# Patient Record
Sex: FEMALE | Race: WHITE | NOT HISPANIC OR LATINO | Employment: OTHER | URBAN - METROPOLITAN AREA
[De-identification: names, ages, dates, MRNs, and addresses within clinical notes are randomized per-mention and may not be internally consistent; named-entity substitution may affect disease eponyms.]

---

## 2017-02-28 ENCOUNTER — ALLSCRIPTS OFFICE VISIT (OUTPATIENT)
Dept: OTHER | Facility: OTHER | Age: 62
End: 2017-02-28

## 2017-02-28 ENCOUNTER — APPOINTMENT (OUTPATIENT)
Dept: LAB | Age: 62
End: 2017-02-28
Payer: COMMERCIAL

## 2017-02-28 ENCOUNTER — HOSPITAL ENCOUNTER (OUTPATIENT)
Dept: RADIOLOGY | Age: 62
Discharge: HOME | End: 2017-02-28
Payer: COMMERCIAL

## 2017-02-28 ENCOUNTER — TRANSCRIBE ORDERS (OUTPATIENT)
Dept: ADMINISTRATIVE | Age: 62
End: 2017-02-28

## 2017-02-28 DIAGNOSIS — F32.9 MAJOR DEPRESSIVE DISORDER, SINGLE EPISODE: ICD-10-CM

## 2017-02-28 DIAGNOSIS — M54.50 LOW BACK PAIN: ICD-10-CM

## 2017-02-28 DIAGNOSIS — R94.6 ABNORMAL RESULTS OF THYROID FUNCTION STUDIES: ICD-10-CM

## 2017-02-28 DIAGNOSIS — M79.646 PAIN OF FINGER: ICD-10-CM

## 2017-02-28 DIAGNOSIS — R26.89 OTHER ABNORMALITIES OF GAIT AND MOBILITY: ICD-10-CM

## 2017-02-28 DIAGNOSIS — M54.2 CERVICALGIA: ICD-10-CM

## 2017-02-28 DIAGNOSIS — R51.9 HEADACHE: ICD-10-CM

## 2017-02-28 LAB
ALBUMIN SERPL BCP-MCNC: 3.4 G/DL (ref 3.5–5)
ALP SERPL-CCNC: 121 U/L (ref 46–116)
ALT SERPL W P-5'-P-CCNC: 15 U/L (ref 12–78)
ANION GAP SERPL CALCULATED.3IONS-SCNC: 7 MMOL/L (ref 4–13)
AST SERPL W P-5'-P-CCNC: 18 U/L (ref 5–45)
BASOPHILS # BLD AUTO: 0.03 THOUSANDS/ΜL (ref 0–0.1)
BASOPHILS NFR BLD AUTO: 0 % (ref 0–1)
BILIRUB SERPL-MCNC: 0.31 MG/DL (ref 0.2–1)
BUN SERPL-MCNC: 8 MG/DL (ref 5–25)
CALCIUM SERPL-MCNC: 9.4 MG/DL (ref 8.3–10.1)
CHLORIDE SERPL-SCNC: 105 MMOL/L (ref 100–108)
CO2 SERPL-SCNC: 28 MMOL/L (ref 21–32)
CREAT SERPL-MCNC: 0.86 MG/DL (ref 0.6–1.3)
EOSINOPHIL # BLD AUTO: 0.11 THOUSAND/ΜL (ref 0–0.61)
EOSINOPHIL NFR BLD AUTO: 2 % (ref 0–6)
ERYTHROCYTE [DISTWIDTH] IN BLOOD BY AUTOMATED COUNT: 14.5 % (ref 11.6–15.1)
ERYTHROCYTE [SEDIMENTATION RATE] IN BLOOD: 36 MM/HOUR (ref 0–20)
GFR SERPL CREATININE-BSD FRML MDRD: >60 ML/MIN/1.73SQ M
GLUCOSE SERPL-MCNC: 80 MG/DL (ref 65–140)
HCT VFR BLD AUTO: 39.9 % (ref 34.8–46.1)
HGB BLD-MCNC: 13 G/DL (ref 11.5–15.4)
LYMPHOCYTES # BLD AUTO: 3.07 THOUSANDS/ΜL (ref 0.6–4.47)
LYMPHOCYTES NFR BLD AUTO: 42 % (ref 14–44)
MCH RBC QN AUTO: 29.7 PG (ref 26.8–34.3)
MCHC RBC AUTO-ENTMCNC: 32.6 G/DL (ref 31.4–37.4)
MCV RBC AUTO: 91 FL (ref 82–98)
MONOCYTES # BLD AUTO: 0.62 THOUSAND/ΜL (ref 0.17–1.22)
MONOCYTES NFR BLD AUTO: 8 % (ref 4–12)
NEUTROPHILS # BLD AUTO: 3.52 THOUSANDS/ΜL (ref 1.85–7.62)
NEUTS SEG NFR BLD AUTO: 48 % (ref 43–75)
NRBC BLD AUTO-RTO: 0 /100 WBCS
PLATELET # BLD AUTO: 393 THOUSANDS/UL (ref 149–390)
PMV BLD AUTO: 10.4 FL (ref 8.9–12.7)
POTASSIUM SERPL-SCNC: 4.6 MMOL/L (ref 3.5–5.3)
PROT SERPL-MCNC: 7.4 G/DL (ref 6.4–8.2)
RBC # BLD AUTO: 4.37 MILLION/UL (ref 3.81–5.12)
SODIUM SERPL-SCNC: 140 MMOL/L (ref 136–145)
T4 FREE SERPL-MCNC: 0.77 NG/DL (ref 0.76–1.46)
TSH SERPL DL<=0.05 MIU/L-ACNC: 2.51 UIU/ML (ref 0.36–3.74)
WBC # BLD AUTO: 7.37 THOUSAND/UL (ref 4.31–10.16)

## 2017-02-28 PROCEDURE — 80053 COMPREHEN METABOLIC PANEL: CPT

## 2017-02-28 PROCEDURE — 84443 ASSAY THYROID STIM HORMONE: CPT

## 2017-02-28 PROCEDURE — 84439 ASSAY OF FREE THYROXINE: CPT

## 2017-02-28 PROCEDURE — 85025 COMPLETE CBC W/AUTO DIFF WBC: CPT

## 2017-02-28 PROCEDURE — 36415 COLL VENOUS BLD VENIPUNCTURE: CPT

## 2017-02-28 PROCEDURE — 73140 X-RAY EXAM OF FINGER(S): CPT

## 2017-02-28 PROCEDURE — 85652 RBC SED RATE AUTOMATED: CPT

## 2017-03-02 ENCOUNTER — GENERIC CONVERSION - ENCOUNTER (OUTPATIENT)
Dept: OTHER | Facility: OTHER | Age: 62
End: 2017-03-02

## 2017-03-21 ENCOUNTER — ALLSCRIPTS OFFICE VISIT (OUTPATIENT)
Dept: OTHER | Facility: OTHER | Age: 62
End: 2017-03-21

## 2017-04-11 ENCOUNTER — ALLSCRIPTS OFFICE VISIT (OUTPATIENT)
Dept: OTHER | Facility: OTHER | Age: 62
End: 2017-04-11

## 2017-06-29 ENCOUNTER — ALLSCRIPTS OFFICE VISIT (OUTPATIENT)
Dept: OTHER | Facility: OTHER | Age: 62
End: 2017-06-29

## 2017-10-31 ENCOUNTER — APPOINTMENT (OUTPATIENT)
Dept: LAB | Facility: CLINIC | Age: 62
End: 2017-10-31
Payer: COMMERCIAL

## 2017-10-31 ENCOUNTER — ALLSCRIPTS OFFICE VISIT (OUTPATIENT)
Dept: OTHER | Facility: OTHER | Age: 62
End: 2017-10-31

## 2017-10-31 DIAGNOSIS — R94.6 ABNORMAL RESULTS OF THYROID FUNCTION STUDIES: ICD-10-CM

## 2017-10-31 DIAGNOSIS — R53.83 OTHER FATIGUE: ICD-10-CM

## 2017-10-31 DIAGNOSIS — F41.9 ANXIETY DISORDER: ICD-10-CM

## 2017-10-31 DIAGNOSIS — R26.89 OTHER ABNORMALITIES OF GAIT AND MOBILITY: ICD-10-CM

## 2017-10-31 DIAGNOSIS — Z20.2 CONTACT WITH AND (SUSPECTED) EXPOSURE TO INFECTIONS WITH A PREDOMINANTLY SEXUAL MODE OF TRANSMISSION: ICD-10-CM

## 2017-10-31 DIAGNOSIS — F32.1 MAJOR DEPRESSIVE DISORDER, SINGLE EPISODE, MODERATE (HCC): ICD-10-CM

## 2017-10-31 DIAGNOSIS — M54.50 LOW BACK PAIN: ICD-10-CM

## 2017-10-31 DIAGNOSIS — R29.90 UNSPECIFIED SYMPTOMS AND SIGNS INVOLVING THE NERVOUS SYSTEM (CODE): ICD-10-CM

## 2017-10-31 LAB
ALBUMIN SERPL BCP-MCNC: 3.3 G/DL (ref 3.5–5)
ALP SERPL-CCNC: 132 U/L (ref 46–116)
ALT SERPL W P-5'-P-CCNC: 17 U/L (ref 12–78)
ANION GAP SERPL CALCULATED.3IONS-SCNC: 6 MMOL/L (ref 4–13)
AST SERPL W P-5'-P-CCNC: 21 U/L (ref 5–45)
BASOPHILS # BLD AUTO: 0.04 THOUSANDS/ΜL (ref 0–0.1)
BASOPHILS NFR BLD AUTO: 1 % (ref 0–1)
BILIRUB SERPL-MCNC: 0.17 MG/DL (ref 0.2–1)
BUN SERPL-MCNC: 9 MG/DL (ref 5–25)
CALCIUM SERPL-MCNC: 9.1 MG/DL (ref 8.3–10.1)
CHLORIDE SERPL-SCNC: 107 MMOL/L (ref 100–108)
CO2 SERPL-SCNC: 27 MMOL/L (ref 21–32)
CREAT SERPL-MCNC: 0.81 MG/DL (ref 0.6–1.3)
EOSINOPHIL # BLD AUTO: 0.09 THOUSAND/ΜL (ref 0–0.61)
EOSINOPHIL NFR BLD AUTO: 1 % (ref 0–6)
ERYTHROCYTE [DISTWIDTH] IN BLOOD BY AUTOMATED COUNT: 14.9 % (ref 11.6–15.1)
GFR SERPL CREATININE-BSD FRML MDRD: 78 ML/MIN/1.73SQ M
GLUCOSE SERPL-MCNC: 67 MG/DL (ref 65–140)
HCT VFR BLD AUTO: 40.8 % (ref 34.8–46.1)
HCV AB SER QL: NORMAL
HGB BLD-MCNC: 13 G/DL (ref 11.5–15.4)
LYMPHOCYTES # BLD AUTO: 2.32 THOUSANDS/ΜL (ref 0.6–4.47)
LYMPHOCYTES NFR BLD AUTO: 30 % (ref 14–44)
MCH RBC QN AUTO: 29 PG (ref 26.8–34.3)
MCHC RBC AUTO-ENTMCNC: 31.9 G/DL (ref 31.4–37.4)
MCV RBC AUTO: 91 FL (ref 82–98)
MONOCYTES # BLD AUTO: 0.58 THOUSAND/ΜL (ref 0.17–1.22)
MONOCYTES NFR BLD AUTO: 7 % (ref 4–12)
NEUTROPHILS # BLD AUTO: 4.76 THOUSANDS/ΜL (ref 1.85–7.62)
NEUTS SEG NFR BLD AUTO: 61 % (ref 43–75)
NRBC BLD AUTO-RTO: 0 /100 WBCS
PLATELET # BLD AUTO: 401 THOUSANDS/UL (ref 149–390)
PMV BLD AUTO: 10.2 FL (ref 8.9–12.7)
POTASSIUM SERPL-SCNC: 4.3 MMOL/L (ref 3.5–5.3)
PROT SERPL-MCNC: 7.6 G/DL (ref 6.4–8.2)
RBC # BLD AUTO: 4.49 MILLION/UL (ref 3.81–5.12)
SODIUM SERPL-SCNC: 140 MMOL/L (ref 136–145)
TSH SERPL DL<=0.05 MIU/L-ACNC: 3.39 UIU/ML (ref 0.36–3.74)
WBC # BLD AUTO: 7.81 THOUSAND/UL (ref 4.31–10.16)

## 2017-10-31 PROCEDURE — 84443 ASSAY THYROID STIM HORMONE: CPT

## 2017-10-31 PROCEDURE — 36415 COLL VENOUS BLD VENIPUNCTURE: CPT

## 2017-10-31 PROCEDURE — 80053 COMPREHEN METABOLIC PANEL: CPT

## 2017-10-31 PROCEDURE — 85025 COMPLETE CBC W/AUTO DIFF WBC: CPT

## 2017-10-31 PROCEDURE — 86592 SYPHILIS TEST NON-TREP QUAL: CPT

## 2017-10-31 PROCEDURE — 87389 HIV-1 AG W/HIV-1&-2 AB AG IA: CPT

## 2017-10-31 PROCEDURE — 86803 HEPATITIS C AB TEST: CPT

## 2017-10-31 PROCEDURE — 87529 HSV DNA AMP PROBE: CPT

## 2017-11-01 LAB
HIV 1+2 AB+HIV1 P24 AG SERPL QL IA: NORMAL
RPR SER QL: NORMAL

## 2017-11-03 LAB
HSV1 DNA SPEC QL NAA+PROBE: NEGATIVE
HSV2 DNA SPEC QL NAA+PROBE: NEGATIVE

## 2017-11-03 NOTE — PROGRESS NOTES
Assessment  1  Anxiety (300 00) (F41 9)   2  Moderate major depression (296 22) (F32 1)   3  Imbalance (781 2) (R26 89)   4  Fatigue (780 79) (R53 83)   5  Possible exposure to STD (V01 6) (Z20 2)   6  Elevated TSH (794 5) (R94 6)   7  Lower back pain (724 2) (M54 5)   8  Abnormal neurological findings (781 99) (R29 90)    Plan   Abnormal neurological findings, Anxiety, Elevated TSH, Fatigue, Imbalance, Lower back  pain, Moderate major depression, Possible exposure to STD    · (1) HSV TYPING; Status:Active; Requested for:31Oct2017;    · (1) TSH; Status:Active; Requested for:31Oct2017; Abnormal neurological findings, Imbalance    · * MRI BRAIN W WO CONTRAST; Status:Need Information - Financial Authorization; Requested for:31Oct2017; Moderate major depression    · BuPROPion HCl ER (XL) 150 MG Oral Tablet Extended Release 24 Hour; TAKE  ONE (1) TABLET(S) DAILY    (1) RPR; Status:Resulted - Requires Verification;   Done: 15DJE3630 12:00AM  Due:31Oct2018; Ordered; For:Abnormal neurological findings, Anxiety, Elevated TSH, Fatigue, Imbalance, Lower back pain, Moderate major depression, Possible exposure to STD; Ordered By:Cheryl Goodman;   (1) HIV AG/AB COMBO, 4TH GEN; [Do Not Release]; Status:Resulted - Requires Verification;   Done: 95VMA2155 12:00AM  XTY:[id number]; Ordered; For:Abnormal neurological findings, Anxiety, Elevated TSH, Fatigue, Imbalance, Lower back pain, Moderate major depression, Possible exposure to STD; Ordered By:Cheryl Goodman;    (1) CBC/PLT/DIFF; Status:Resulted - Requires Verification;   Done: 62FEO4462 12:00AM  HPE:[id number]; Ordered; For:Abnormal neurological findings, Anxiety, Elevated TSH, Fatigue, Imbalance, Lower back pain, Moderate major depression, Possible exposure to STD; Ordered By:Cheryl Goodman;   (1) COMPREHENSIVE METABOLIC PANEL; Status:Resulted - Requires Verification;   Done: 00FTE4612 12:00AM  MKC:[id number]; Ordered;     For:Abnormal neurological findings, Anxiety, Elevated TSH, Fatigue, Imbalance, Lower back pain, Moderate major depression, Possible exposure to STD; Ordered By:Dajuan Goodman;   (1) HEP C ANTIBODY; Status:Resulted - Requires Verification;   Done: 08FML4722 12:00AM  VUO:19EBZ1453; Ordered; For:Abnormal neurological findings, Anxiety, Elevated TSH, Fatigue, Imbalance, Lower back pain, Moderate major depression, Possible exposure to STD; Ordered By:Corina Goodman;      Discussion/Summary    #1 moderate depression with anxiety - pt has failed multiple meds in the past and would definitely benefit from counselling  I will reach out to Mr Georgette Monroy to see if we can arrange for e-visit with Sherita  professional  I would like her to see psych as well but will wait to see if transportation issues improve  I will add Wellbutrin XL 150mg qd for now  I reviewed side effects of meds  Recheck 1mimbalance - with abnormal neurologic exam  I reviewed with pt  REC: refer for MRI of the brain? exposure to STD - check labs  elevated TSH - check labslow back pain - chronic  We discussed treatment options  Pt would like to wait until above issues are addressed  Recheck 4 weeksfatigue - ? related to depression? Check labs as above  Recheck as above  to f/u as above  Pt to call for problems or concerns in the interim  The patient was counseled regarding diagnostic results,-- instructions for management,-- risk factor reductions,-- prognosis,-- patient and family education,-- impressions,-- risks and benefits of treatment options,-- importance of compliance with treatment  Possible side effects of new medications were reviewed with the patient/guardian today  The treatment plan was reviewed with the patient/guardian  The patient/guardian understands and agrees with the treatment plan      Chief Complaint  4 month follow upand anxietyoff   Patient is here today for follow up of chronic conditions described in HPI        History of Present Illness  as above - f/u multiple issuespt lost a friend last weekend and has been feeling sad since  Was having issues with sleep bu is not sleeping better since she started melatonin  Pt would like to speak with a counsellor but has transportation issues  pt found that an ex-contact passed away from syphilis and would like to be tested  Pt would also like to be checked for HSVpt denies CP, palp, lightheadedness or other CV symtposmstill having ear issues  Pt describes a feeling like something is crawling on the skin of the outer ear near the auditory canal  Pt does use q-tipsSome increased imbalance  Family notes that she could be walking when she suddenly loses her balance  Pt states that she gets her balance back almost immediately  No falls associated with this  No asymmetric weakness or numbness  Still with low back pain though it is not clear if this pain is related to her weaknessI reviewed PMHx, PSHx, Soc hx and Med list with pt and family member      Review of Systems    Constitutional: as noted in HPI  Eyes: No complaints of eye pain, no red eyes, no eyesight problems, no discharge, no dry eyes, no itching of eyes  ENT: no complaints of earache, no loss of hearing, no nose bleeds, no nasal discharge, no sore throat, no hoarseness  Cardiovascular: No complaints of slow heart rate, no fast heart rate, no chest pain, no palpitations, no leg claudication, no lower extremity edema  Respiratory: No complaints of shortness of breath, no wheezing, no cough, no SOB on exertion, no orthopnea, no PND  Gastrointestinal: No complaints of abdominal pain, no constipation, no nausea or vomiting, no diarrhea, no bloody stools  Genitourinary: No complaints of dysuria, no incontinence, no pelvic pain, no dysmenorrhea, no vaginal discharge or bleeding  Musculoskeletal: as noted in HPI  Integumentary: No complaints of skin rash or lesions, no itching, no skin wounds, no breast pain or lump  Neurological: as noted in HPI  Psychiatric: as noted in HPI  Endocrine: No complaints of proptosis, no hot flashes, no muscle weakness, no deepening of the voice, no feelings of weakness  Hematologic/Lymphatic: No complaints of swollen glands, no swollen glands in the neck, does not bleed easily, does not bruise easily  Over the past 2 weeks, how often have you been bothered by the following problems? 1 ) Little interest or pleasure in doing things? Nearly every day  2 ) Feeling down, depressed or hopeless? Nearly every day  3 ) Trouble falling asleep or sleeping too much? Several days  4 ) Feeling tired or having little energy? Half the days or more  5 ) Poor appetite or overeating? Half the days or more  6 ) Feeling bad about yourself, or that you are a failure, or have let yourself or your family down? Several days  7 ) Trouble concentrating on things, such as reading a newspaper or watching television? Nearly every day  8 ) Moving or speaking so slowly that other people could have noticed, or the opposite, moving or speaking faster than usual? Not at all    9 ) Thoughts that you would be better off dead or of hurting yourself in some way? 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)   13  Neck pain (723 1) (M54 2)   14  Osteoarthritis (715 90) (M19 90)   15  Thumb pain (729 5) (M79 646)    Surgical History  1  History of Bladder Surgery   2  History of Gallbladder Surgery   3  History of Percutaneous Cholecystostomy   4  History of Urethra Surgery    Family History  Mother    1  Family history of Methicillin Resistant Staphylococcus Aureus Infection   2  Family history of Reported Previous Mental Illness  Father    3  Family history of Lung Cancer (V16 1)    Social History   · Denied: History of Alcohol Use (History)   · Always uses seat belt   · Current Every Day Smoker (305 1)   · Daily Coffee Consumption (___ Cups/Day)   · Denied: History of Daily Cola Consumption (___ Cans/Day)   · Denied: History of Dental care, regularly   · Does not exercise (V69 0) (Z72 3)   · High school graduate   · 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)   · No guns in the home   · No illicit drug use   · No living will   · Pets in the home   · Denied: History of Power of  in existence   · Retired   · Tea   · Water intake, adequate (per day)    Current Meds   1  Calcium 600 MG Oral Tablet; Therapy: (Recorded:11Oct2012) to Recorded   2  ClonazePAM 0 5 MG Oral Tablet; TAKE ONE (1) TABLET daily at bedtime; Therapy: 19XUS0226 to (Evaluate:16Nov2017)  Requested for: 60PXS5930; Last   Rx:17Oct2017 Ordered   3  [employer] Vitamin B-12 1000 MCG Oral Tablet; Therapy: (Recorded:11Oct2012) to Recorded   4  Equate Allergy-Sinus Headache TABS; TAKE 1 TABLET AT BEDTIME Recorded   5  Fluticasone Propionate 50 MCG/ACT Nasal Suspension; USE 2 SPRAYS IN EACH   NOSTRIL ONCE DAILY; Therapy: 24NVG6191 to (Last [facility])  Requested for: 16NQZ9583 Ordered   6  Gabapentin 300 MG Oral Capsule; TAKE ONE CAPSULE BY MOUTH THREE TIMES A   DAY; Therapy: 72FKB0331 to (Evaluate:03Mar2018)  Requested for: 07CWT5173; Last   Rx:08Mar2017 Ordered   7  Magnesium CAPS; TAKE 1 CAPSULE Daily; Therapy: (Recorded:13Nov2012) to Recorded   8  Melatonin 1 MG Oral Capsule; Therapy: (Recorded:31Oct2017) to Recorded   9  Multi-Vitamin TABS; Therapy: (Recorded:11Oct2012) to Recorded   10   Omeprazole 40 MG Oral Capsule Delayed Release; TAKE ONE CAPSULE BY MOUTH    EVERY DAY; Therapy: 25DYY5760 to (Evaluate:13Nov2017)  Requested for: 18CAQ9604; Last    Rx:77Uco9654 Ordered   11  Venlafaxine HCl  MG Oral Capsule Extended Release 24 Hour; TAKE ONE    CAPSULE BY MOUTH EVERY DAY; Therapy: 14TWN3895 to (Evaluate:03Mar2018)  Requested for: 83PRD7542; Last    Rx:08Mar2017 Ordered   12  Venlafaxine HCl ER 75 MG Oral Capsule Extended Release 24 Hour; TAKE ONE    CAPSULE BY MOUTH EVERY DAY; Therapy: 71OFZ3131 to (Evaluate:84Qbu0110)  Requested for: 25Lhs0919; Last    Rx:03Sep2017 Ordered   13  Vitamin D 1000 UNIT CAPS; Therapy: (Recorded:11Oct2012) to Recorded    Allergies  1  Penicillins   2  Vancomycin HCl CAPS    Vitals  Vital Signs    Recorded: 75SNO2654 08:58AM   Temperature 94 7 F   Heart Rate 78   Respiration 16   Systolic 590   Diastolic 66   Height 5 ft 3 in   Weight 156 lb 8 oz   BMI Calculated 27 72   BSA Calculated 1 74     Physical Exam    Constitutional   General appearance: No acute distress, well appearing and well nourished  Head and Face   Head and face: Normal     Eyes   Conjunctiva and lids: No swelling, erythema or discharge  Pupils and irises: Equal, round, reactive to light  Ophthalmoscopic examination: Normal fundi and optic discs  Ears, Nose, Mouth, and Throat   External inspection of ears and nose: Abnormal  -- mild irritation (dry appearing) at the auditory canal orific  Otoscopic examination: Tympanic membranes translucent with normal light reflex  Canals patent without erythema  Nasal mucosa, septum, and turbinates: Normal without edema or erythema  Lips, teeth, and gums: Normal, good dentition  Oropharynx: Normal with no erythema, edema, exudate or lesions  Neck   Neck: Supple, symmetric, trachea midline, no masses  Thyroid: Normal, no thyromegaly  Pulmonary   Respiratory effort: No increased work of breathing or signs of respiratory distress      Auscultation of lungs: Clear to auscultation  Cardiovascular   Auscultation of heart: Normal rate and rhythm, normal S1 and S2, no murmurs  Carotid pulses: 2+ bilaterally  Abdominal aorta: Normal     Pedal pulses: 2+ bilaterally  Peripheral vascular exam: Normal     Examination of extremities for edema and/or varicosities: Normal     Abdomen   Abdomen: Non-tender, no masses  Liver and spleen: No hepatomegaly or splenomegaly  Lymphatic   Palpation of lymph nodes in neck: No lymphadenopathy  Palpation of lymph nodes in other areas: No lymphadenopathy  Musculoskeletal   Gait and station: Abnormal  -- mildly unbalanced gait  Digits and nails: Abnormal  -- diffuse OA changes  Joints, bones, and muscles: Abnormal  -- sl TTP over the low lumbar spine  Muscle strength/tone: Abnormal  -- ? mild weakenss on L hand   Skin   Skin and subcutaneous tissue: Normal without rashes or lesions  Neurologic   Cranial nerves: Cranial nerves II-XII intact  Cortical function: Normal mental status  Reflexes: Abnormal  -- increaseed reflexes throughout  Sensation: No sensory loss  Coordination: Abnormal  -- (+) severe imbalance when pt closes her eyes  Psychiatric   Judgment and insight: Normal     Orientation to person, place, and time: Normal     Mood and affect: Abnormal  -- as abovwe  PHQ-9 = 15  Future Appointments    Date/Time Provider Specialty Site   11/28/2017 10:15 AM RENNY Rollins   [address] [facility]     Signatures   Electronically signed by : RENNY Aguilera ; Nov 2 2017  9:13AM EST                       (Author)

## 2017-11-13 ENCOUNTER — TRANSCRIBE ORDERS (OUTPATIENT)
Dept: ADMINISTRATIVE | Facility: HOSPITAL | Age: 62
End: 2017-11-13

## 2017-11-13 DIAGNOSIS — R26.89 BALANCE DISORDER: Primary | ICD-10-CM

## 2017-11-28 ENCOUNTER — GENERIC CONVERSION - ENCOUNTER (OUTPATIENT)
Dept: OTHER | Facility: OTHER | Age: 62
End: 2017-11-28

## 2017-12-03 ENCOUNTER — HOSPITAL ENCOUNTER (OUTPATIENT)
Dept: MRI IMAGING | Facility: HOSPITAL | Age: 62
Discharge: HOME | End: 2017-12-03
Payer: COMMERCIAL

## 2017-12-03 DIAGNOSIS — R26.89 BALANCE DISORDER: ICD-10-CM

## 2017-12-21 ENCOUNTER — TRANSCRIBE ORDERS (OUTPATIENT)
Dept: ADMINISTRATIVE | Facility: HOSPITAL | Age: 62
End: 2017-12-21

## 2017-12-21 DIAGNOSIS — R29.90 NEUROTOXICITY: Primary | ICD-10-CM

## 2017-12-21 DIAGNOSIS — R26.89 SCISSORING GAIT: ICD-10-CM

## 2017-12-27 DIAGNOSIS — R89.2 ABNORMAL LEVEL OF OTHER DRUGS, MEDICAMENTS AND BIOLOGICAL SUBSTANCES IN SPECIMENS FROM OTHER ORGANS, SYSTEMS AND TISSUES: ICD-10-CM

## 2017-12-27 DIAGNOSIS — R26.89 OTHER ABNORMALITIES OF GAIT AND MOBILITY: ICD-10-CM

## 2017-12-27 DIAGNOSIS — R29.90 UNSPECIFIED SYMPTOMS AND SIGNS INVOLVING THE NERVOUS SYSTEM (CODE): ICD-10-CM

## 2017-12-28 ENCOUNTER — LAB (OUTPATIENT)
Dept: LAB | Facility: CLINIC | Age: 62
End: 2017-12-28
Payer: COMMERCIAL

## 2017-12-28 DIAGNOSIS — R89.2 ABNORMAL LEVEL OF OTHER DRUGS, MEDICAMENTS AND BIOLOGICAL SUBSTANCES IN SPECIMENS FROM OTHER ORGANS, SYSTEMS AND TISSUES: ICD-10-CM

## 2017-12-28 LAB
BUN SERPL-MCNC: 10 MG/DL (ref 5–25)
CREAT SERPL-MCNC: 0.8 MG/DL (ref 0.6–1.3)
GFR SERPL CREATININE-BSD FRML MDRD: 78 ML/MIN/1.73SQ M

## 2017-12-28 PROCEDURE — 84520 ASSAY OF UREA NITROGEN: CPT

## 2017-12-28 PROCEDURE — 82565 ASSAY OF CREATININE: CPT

## 2017-12-28 PROCEDURE — 36415 COLL VENOUS BLD VENIPUNCTURE: CPT

## 2018-01-02 ENCOUNTER — HOSPITAL ENCOUNTER (OUTPATIENT)
Dept: RADIOLOGY | Facility: HOSPITAL | Age: 63
Discharge: HOME | End: 2018-01-02
Payer: COMMERCIAL

## 2018-01-02 DIAGNOSIS — R29.90 UNSPECIFIED SYMPTOMS AND SIGNS INVOLVING THE NERVOUS SYSTEM (CODE): ICD-10-CM

## 2018-01-02 DIAGNOSIS — R26.89 OTHER ABNORMALITIES OF GAIT AND MOBILITY: ICD-10-CM

## 2018-01-02 PROCEDURE — 70553 MRI BRAIN STEM W/O & W/DYE: CPT

## 2018-01-02 PROCEDURE — A9585 GADOBUTROL INJECTION: HCPCS | Performed by: RADIOLOGY

## 2018-01-02 RX ADMIN — GADOBUTROL 7 ML: 604.72 INJECTION INTRAVENOUS at 13:31

## 2018-01-03 ENCOUNTER — GENERIC CONVERSION - ENCOUNTER (OUTPATIENT)
Dept: OTHER | Facility: OTHER | Age: 63
End: 2018-01-03

## 2018-01-12 VITALS
DIASTOLIC BLOOD PRESSURE: 78 MMHG | HEIGHT: 63 IN | BODY MASS INDEX: 25.87 KG/M2 | HEART RATE: 96 BPM | WEIGHT: 146 LBS | SYSTOLIC BLOOD PRESSURE: 116 MMHG | TEMPERATURE: 98.4 F

## 2018-01-12 VITALS
WEIGHT: 156.5 LBS | DIASTOLIC BLOOD PRESSURE: 66 MMHG | TEMPERATURE: 94.7 F | BODY MASS INDEX: 27.73 KG/M2 | RESPIRATION RATE: 16 BRPM | HEART RATE: 78 BPM | HEIGHT: 63 IN | SYSTOLIC BLOOD PRESSURE: 118 MMHG

## 2018-01-13 VITALS
SYSTOLIC BLOOD PRESSURE: 118 MMHG | TEMPERATURE: 96.7 F | HEART RATE: 76 BPM | WEIGHT: 149 LBS | HEIGHT: 63 IN | BODY MASS INDEX: 26.4 KG/M2 | DIASTOLIC BLOOD PRESSURE: 64 MMHG

## 2018-01-13 NOTE — MISCELLANEOUS
Assessment    1  Acute cholecystitis (575 0) (K81 0)   2  Aphthous ulcer (528 2) (K12 0)   3  History of Percutaneous Cholecystostomy    Plan  Aphthous ulcer    · Orabase 20 % Mouth/Throat Paste; APPLY SPARINGLY 3 TIMES A DAY   Rx By: Josephine Estrada; Dispense: 4 Days ; #:1 X 11 9 GM Tube; Refill: 1; For: Aphthous ulcer; PATRICE = N; Verified Transmission to [facility] 9934; Last Updated By: System, [employer]; 5/23/2016 4:49:26 PM    Discussion/Summary  Discussion Summary:   #1 acute cholecystitis - status post cholecystostomy tube placement  Patient is improving  Patient aware that she will have this tube in place for minimum of 6 months before they consider removing her gallbladder  I reviewed signs of obstruction, as well as signs of infection  Patient will finish in about therapy  Patient will follow-up in 4-6 weeks  Continue follow-up with surgery  #2 aphthous ulcer - persistent  Given history of smoking this is a little worrisome  We will try patient on Orabase as directed 3-4 times a day  Recheck 2 weeks if not resolving  Consider biopsy if not resolving  Patient recheck as above  Patient call for problems or concerns in the interim  Counseling Documentation With Imm: The patient was counseled regarding diagnostic results, instructions for management, risk factor reductions, prognosis, patient and family education, impressions, risks and benefits of treatment options, importance of compliance with treatment  Medication SE Review and Pt Understands Tx: Possible side effects of new medications were reviewed with the patient/guardian today  The treatment plan was reviewed with the patient/guardian  The patient/guardian understands and agrees with the treatment plan      History of Present Illness  TCM Communication [facility]: The patient is being contacted for follow-up after hospitalization and APPOINT SCHEDULED FOR 5-23-16 AT 1:45 PM Faustina Beory   She was hospitalized at and [facility] [place]  The dates of hospitalization:, date of admission: 5-11-16, date of discharge: 5-13-16  Diagnosis: CHOLECYSTOSTOMY TUBE  She was discharged to home  Medications reviewed and updated today  She scheduled a follow up appointment  The patient is currently asymptomatic  Counseling was provided to the patient  Communication performed and completed by RENNY Katz LPN   HPI: as above  - pt admitted acutely on 5/11 for severe RUQ pain  Pt had undergone w/u for persistent RUQ and epigastric pain which showed GB sludge and a 1 3 cm gallstone  In the hospital, pt was found to have too much inflammation for surgery and had percutaneous GB drain placed by IR  Pt was discharged on abx with plan for elective cholecystectomy in August    - pt states that she feels "a little better each day" since surgery  NO change in BMs  No fever/chills  Back pain is a little worse  No dysuria of frequency  No CP, palp, lightheadedness or other CV symptoms  - 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 that she like to have it evaluated  It may improved but never quite goes away  This is been going on for over a month  Patient is a smoker   - I reviewed available hospital records, lab results and study results with patient  Review of Systems  Complete-Female:   Constitutional: as noted in HPI  Eyes: No complaints of eye pain, no red eyes, no eyesight problems, no discharge, no dry eyes, no itching of eyes  ENT: no complaints of earache, no loss of hearing, no nose bleeds, no nasal discharge, no sore throat, no hoarseness  Cardiovascular: No complaints of slow heart rate, no fast heart rate, no chest pain, no palpitations, no leg claudication, no lower extremity edema  Respiratory: No complaints of shortness of breath, no wheezing, no cough, no SOB on exertion, no orthopnea, no PND  Gastrointestinal: as noted in HPI     Genitourinary: No complaints of dysuria, no incontinence, no pelvic pain, no dysmenorrhea, no vaginal discharge or bleeding  Musculoskeletal: arthralgias and myalgias, but no limb swelling  Integumentary: No complaints of skin rash or lesions, no itching, no skin wounds, no breast pain or lump  Neurological: No complaints of headache, no confusion, no convulsions, no numbness, no dizziness or fainting, no tingling, no limb weakness, no difficulty walking  Endocrine: No complaints of proptosis, no hot flashes, no muscle weakness, no deepening of the voice, no feelings of weakness  Hematologic/Lymphatic: No complaints of swollen glands, no swollen glands in the neck, does not bleed easily, does not bruise easily  Active Problems    1  Abdominal pain, epigastric (789 06) (R10 13)   2  Abdominal pain, RLQ (right lower quadrant) (789 03) (R10 31)   3  Abrasion Of The Elbow (913 0)   4  Abrasion On The Left Lower Back (911 0)   5  Acute frontal sinusitis (461 1) (J01 10)   6  Acute maxillary sinusitis (461 0) (J01 00)   7  Acute suppurative otitis media without spontaneous rupture of ear drum, unspecified   laterality   8  Anxiety (300 00) (F41 9)   9  Bronchitis, acute (466 0) (J20 9)   10  Contusion of skin with intact surface (924 9) (T14 8)   11  Cough (786 2) (R05)   12  Depression (311) (F32 9)   13  Excessive thirst (783 5) (R63 1)   14  Fatigue (780 79) (R53 83)   15  Gastroesophageal reflux disease (530 81) (K21 9)   16  Hip pain, unspecified laterality   17  Joint pain, knee (719 46) (M25 569)   18  Leg Weakness (728 87)   19  Lower back pain (724 2) (M54 5)   20  Neck pain (723 1) (M54 2)   21  Osteoarthritis (715 90) (M19 90)   22  Otitis externa, unspecified laterality   23  Urinary tract infection (599 0) (N39 0)    Surgical History    1  History of Bladder Surgery   2  History of Percutaneous Cholecystostomy   3  History of Urethra Surgery  Surgical History Reviewed: The surgical history was reviewed and updated today  Family History  Mother    1   Family history of Methicillin Resistant Staphylococcus Aureus Infection   2  Family history of Reported Previous Mental Illness  Father    3  Family history of Lung Cancer (V16 1)    Social History    · Denied: History of Alcohol Use (History)   · Current Every Day Smoker (305 1)   · Daily Coffee Consumption (___ Cups/Day)   · Daily Cola Consumption (___ Cans/Day)  Social History Reviewed: The social history was reviewed and updated today  Current Meds   1  Calcium 600 MG Oral Tablet; Therapy: (Recorded:11Oct2012) to Recorded   2  ClonazePAM 0 5 MG Oral Tablet; TAKE ONE (1) TABLET daily at bedtime; Therapy: 47TLA5906 to (Evaluate:04Jun2016)  Requested for: 93CNN0374; Last   RS:97BJX6176 Ordered   3  [employer] Vitamin B-12 1000 MCG Oral Tablet; Therapy: (Recorded:11Oct2012) to Recorded   4  Effexor  MG Oral Capsule Extended Release 24 Hour; TAKE 1 CAPSULE DAILY; Therapy: 98BLE6385 to (Renew:05Rux1886)  Requested for: 35EJZ3762; Last   Rx:02Mar2016 Ordered   5  Effexor XR 75 MG Oral Capsule Extended Release 24 Hour; TAKE 1 CAPSULE DAILY; Therapy: 32NEQ5958 to (Renew:65Jvm1661)  Requested for: 56YRZ7731; Last   Rx:02Mar2016 Ordered   6  Equate Allergy-Sinus Headache TABS; TAKE 1 TABLET AT BEDTIME Recorded   7  Gabapentin 300 MG Oral Capsule; Take 1 capsule twice daily Recorded   8  Magnesium CAPS; TAKE 1 CAPSULE Daily; Therapy: (Recorded:13Nov2012) to Recorded   9  Multi-Vitamin TABS; Therapy: (Recorded:11Oct2012) to Recorded   10  Omeprazole 40 MG Oral Capsule Delayed Release; take 1 capsule by mouth once daily; Therapy: 57QXN1079 to (Evaluate:47Qnt5963)  Requested for: 89JWW3826; Last    Rx:75Bbn3561 Ordered   11  Vitamin D 1000 UNIT CAPS; Therapy: (Recorded:11Oct2012) to Recorded  Medication List Reviewed: The medication list was reviewed and updated today  Allergies    1  Penicillins   2   Vancomycin HCl CAPS    Vitals  Signs [Data Includes: Current Encounter]   Recorded: 16JJS1599 01:48PM Temperature: 98 5 F  Heart Rate: 80  Systolic: 707  Diastolic: 72  Height: 5 ft 3 in  Weight: 140 lb   BMI Calculated: 24 8  BSA Calculated: 1 67    Physical Exam    Constitutional   General appearance: No acute distress, well appearing and well nourished  comfortable appearing female in NAD  Head and Face   Head and face: Normal     Eyes   Conjunctiva and lids: No swelling, erythema or discharge  no icterus  Pupils and irises: Equal, round, reactive to light  Ophthalmoscopic examination: Normal fundi and optic discs  Ears, Nose, Mouth, and Throat   External inspection of ears and nose: Normal     Otoscopic examination: Tympanic membranes translucent with normal light reflex  Canals patent without erythema  Nasal mucosa, septum, and turbinates: Normal without edema or erythema  Lips, teeth, and gums: Abnormal   Left lower inner lip aphthous-like ulcer  No other findings  Oropharynx: Normal with no erythema, edema, exudate or lesions  Pulmonary   Respiratory effort: No increased work of breathing or signs of respiratory distress  Auscultation of lungs: Clear to auscultation  Cardiovascular   Auscultation of heart: Normal rate and rhythm, normal S1 and S2, no murmurs  Abdominal aorta: Normal     Peripheral vascular exam: Normal     Examination of extremities for edema and/or varicosities: Normal     Abdomen   Abdomen: Abnormal   Right upper quadrant cholecystostomy tube in place and draining bile material  There is no tenderness palpation the right upper quadrant or epigastric areas  There are no masses  Liver and spleen: Abnormal   as above  Lymphatic   Palpation of lymph nodes in neck: No lymphadenopathy  Palpation of lymph nodes in other areas: No lymphadenopathy  Skin   Skin and subcutaneous tissue: Normal without rashes or lesions  Neurologic   Cranial nerves: Cranial nerves II-XII intact  Cortical function: Normal mental status         Future Appointments    Date/Time Provider Specialty Site   08/24/2016 01:30 PM RENNY Perez   [address] [facility]     Signatures   Electronically signed by : RENNY Rabago ; May 24 2016  7:28AM EST                       (Author)

## 2018-01-14 VITALS
BODY MASS INDEX: 25.87 KG/M2 | DIASTOLIC BLOOD PRESSURE: 72 MMHG | TEMPERATURE: 98.2 F | HEIGHT: 63 IN | HEART RATE: 84 BPM | SYSTOLIC BLOOD PRESSURE: 108 MMHG | WEIGHT: 146 LBS

## 2018-01-15 VITALS
DIASTOLIC BLOOD PRESSURE: 68 MMHG | SYSTOLIC BLOOD PRESSURE: 112 MMHG | HEIGHT: 63 IN | WEIGHT: 144 LBS | HEART RATE: 89 BPM | BODY MASS INDEX: 25.52 KG/M2

## 2018-01-17 NOTE — RESULT NOTES
Verified Results  * XR THUMB LEFT FIRST DIGIT-MIN 2V [id number] 02:43PM Asya Morales Order Number: [id number]     Test Name Result Flag Reference   XR THUMB LEFT FIRST DIGIT-MIN 2V (Report)     LEFT FINGER     INDICATION: Chronic pain      COMPARISON: None     VIEWS: PA view hand and 2 cone-down views of the 1st digit     IMAGES: 3   For the purposes of institution wide universal language the following terms will apply: (thumb=1st digit/finger, index finger=2nd digit/finger, long finger=3rd digit/finger, ring=4th digit/finger and small finger=5th digit/finger)     FINDINGS:     There is no acute fracture or dislocation  No significant degenerative changes  No lytic or blastic lesion  Soft tissues are unremarkable  IMPRESSION:     No fracture  Workstation performed: [id number]     Signed by:   Jayshree Clark MD   3/1/17     * XR THUMB RIGHT FIRST DIGIT-MIN 2V [id number] 02:43PM Lindsay Lopez Order Number: [id number]     Test Name Result Flag Reference   XR THUMB RIGHT FIRST DIGIT-MIN 2V (Report)     RIGHT FINGER     INDICATION: Chronic pain Dx: Pain of finger [F49 576 (ICD-10-CM)]     COMPARISON: None     VIEWS:  PA view hand and 2 cone-down views of the 1st digit     IMAGES: 3     For the purposes of institution wide universal language the following terms will apply: (thumb=1st digit/finger, index finger=2nd digit/finger, long finger=3rd digit/finger, ring=4th digit/finger and small finger=5th digit/finger)     FINDINGS:     There is no acute fracture or dislocation  Minimal degenerative changes at the interphalangeal joint of the thumb  No lytic or blastic lesion  Soft tissues are unremarkable  IMPRESSION:     Minimal degenerative changes at the interphalangeal joint of the thumb         Workstation performed: [id number]     Signed by:   Jayshree Clark MD   3/1/17

## 2018-01-17 NOTE — RESULT NOTES
Verified Results  (1) CBC/PLT/DIFF [id number] 06:30PM Viktoriya Childress Order Number: [id number]     Test Name Result Flag Reference   WBC COUNT 9 29 Thousand/uL  4 31-10 16   RBC COUNT 4 33 Million/uL  3 81-5 12   HEMOGLOBIN 12 7 g/dL  11 5-15 4   HEMATOCRIT 39 3 %  34 8-46  1   MCV 91 fL  82-98   MCH 29 3 pg  26 8-34 3   MCHC 32 3 g/dL  31 4-37 4   RDW 14 3 %  11 6-15 1   MPV 10 2 fL  8 9-12 7   PLATELET COUNT 468 Thousands/uL H 149-390   nRBC AUTOMATED 0 /100 WBCs     NEUTROPHILS RELATIVE PERCENT 53 %  43-75   LYMPHOCYTES RELATIVE PERCENT 38 %  14-44   MONOCYTES RELATIVE PERCENT 8 %  4-12   EOSINOPHILS RELATIVE PERCENT 1 %  0-6   BASOPHILS RELATIVE PERCENT 0 %  0-1   NEUTROPHILS ABSOLUTE COUNT 4 87 Thousands/?L  1 85-7 62   LYMPHOCYTES ABSOLUTE COUNT 3 53 Thousands/?L  0 60-4 47   MONOCYTES ABSOLUTE COUNT 0 72 Thousand/?L  0 17-1 22   EOSINOPHILS ABSOLUTE COUNT 0 12 Thousand/?L  0 00-0 61   BASOPHILS ABSOLUTE COUNT 0 03 Thousands/?L  0 00-0 10   - Patient Instructions: This bloodwork is non-fasting  Please drink two glasses of water morning of bloodwork  - Patient Instructions: This bloodwork is non-fasting  Please drink two glasses of water morning of bloodwork  (1) COMPREHENSIVE METABOLIC PANEL [id number] 70:72AG Catarinarodrifamilia Rae Order Number: [id number]     Test Name Result Flag Reference   GLUCOSE,RANDM 87 mg/dL     If the patient is fasting, the ADA then defines impaired fasting glucose as > 100 mg/dL and diabetes as > or equal to 123 mg/dL     SODIUM 135 mmol/L L 136-145   POTASSIUM 3 7 mmol/L  3 5-5 3   CHLORIDE 100 mmol/L  100-108   CARBON DIOXIDE 29 mmol/L  21-32   ANION GAP (CALC) 6 mmol/L  4-13   BLOOD UREA NITROGEN 11 mg/dL  5-25   CREATININE 0 95 mg/dL  0 60-1 30   Standardized to IDMS reference method   CALCIUM 8 9 mg/dL  8 3-10 1   BILI, TOTAL 0 24 mg/dL  0 20-1 00   ALK PHOSPHATAS 120 U/L H    ALT (SGPT) 16 U/L  12-78   AST(SGOT) 16 U/L 5-45   ALBUMIN 3 6 g/dL  3 5-5 0   TOTAL PROTEIN 7 3 g/dL  6 4-8 2   eGFR Non-African American 59 4 ml/min/1 73sq m     - Patient Instructions: This bloodwork is non-fasting  Please drink two glasses of water morning of bloodwork  National Kidney Disease Education Program recommendations are as follows:  GFR calculation is accurate only with a steady state creatinine  Chronic Kidney disease less than 60 ml/min/1 73 sq  meters  Kidney failure less than 15 ml/min/1 73 sq  meters  (1) TSH [id number] 06:30PM Thomas Leos Order Number: [id number]     Test Name Result Flag Reference   TSH 4 740 uIU/mL H 0 358-3 740   - Patient Instructions: This bloodwork is non-fasting  Please drink two glasses of water morning of bloodwork  - Patient Instructions: This bloodwork is non-fasting  Please drink two glasses of water morning of bloodwork  Patients undergoing fluorescein dye angiography may retain small amounts of fluorescein in the body for 48-72 hours post procedure  Samples containing fluorescein can produce falsely depressed TSH values  If the patient had this procedure,a specimen should be resubmitted post fluorescein clearance            The recommended reference ranges for TSH during pregnancy are as follows:  First trimester 0 1 to 2 5 uIU/mL  Second trimester  0 2 to 3 0 uIU/mL  Third trimester 0 3 to 3 0 uIU/m

## 2018-01-22 VITALS
HEART RATE: 96 BPM | TEMPERATURE: 96.2 F | DIASTOLIC BLOOD PRESSURE: 80 MMHG | HEIGHT: 63 IN | SYSTOLIC BLOOD PRESSURE: 124 MMHG | BODY MASS INDEX: 27.11 KG/M2 | WEIGHT: 153 LBS

## 2018-01-23 NOTE — RESULT NOTES
Verified Results  * MRI BRAIN Lyndsey Persaud [facility] CONTRAST [id number] 12:31PM Tamara Royal Order Number: [id number]    - Patient Instructions: To schedule this appointment, please contact Central Scheduling at [phone]  Test Name Result Flag Reference   MRI BRAIN W WO CONTRAST (Report)     MRI BRAIN WITH AND WITHOUT CONTRAST     INDICATION: Unsteady gait for several months  COMPARISON: None  TECHNIQUE:   Sagittal T1, axial T2, axial FLAIR, axial T1, axial gradient echo, axial diffusion  Sagittal, axial and coronal T1 postcontrast  Axial BRAVO post contrast       IV Contrast: 7 mL of gadobutrol injection (MULTI-DOSE)       IMAGE QUALITY:  Diagnostic  FINDINGS:     BRAIN PARENCHYMA: There is no discrete mass, mass effect or midline shift  There are foci of T2 and FLAIR signal abnormality in the periventricular and subcortical white matter which are typical for microvascular disease in patients of this age group    Brainstem and cerebellum demonstrate normal signal  There is no intracranial hemorrhage  There is no evidence of acute infarction and diffusion imaging is unremarkable  Postcontrast imaging of the brain demonstrates no abnormal enhancement  VENTRICLES: Normal      SELLA AND PITUITARY GLAND: Normal      ORBITS: Normal      PARANASAL SINUSES: Extensive enhancement within opacified right mastoid air cells is compatible with mastoiditis in the right clinical setting  VASCULATURE: Evaluation of the major intracranial vasculature demonstrates appropriate flow voids  CALVARIUM AND SKULL BASE: Normal      EXTRACRANIAL SOFT TISSUES: Normal        IMPRESSION:     Extensive right mastoid air cell opacification with postcontrast enhancement  These findings are compatible with mastoiditis in the right clinical setting  Scattered mild chronic microangiopathic changes         Workstation performed: [id number]     Signed by:   Aubree So DO   1/3/18

## 2018-02-14 DIAGNOSIS — F41.9 ANXIETY: Primary | ICD-10-CM

## 2018-02-14 RX ORDER — CLONAZEPAM 0.5 MG/1
TABLET ORAL
Qty: 30 TABLET | Refills: 0 | OUTPATIENT
Start: 2018-02-14 | End: 2018-03-16 | Stop reason: SDUPTHER

## 2018-03-04 DIAGNOSIS — G89.4 CHRONIC PAIN SYNDROME: Primary | ICD-10-CM

## 2018-03-04 DIAGNOSIS — F32.A DEPRESSION, UNSPECIFIED DEPRESSION TYPE: ICD-10-CM

## 2018-03-04 RX ORDER — GABAPENTIN 300 MG/1
CAPSULE ORAL
Qty: 270 CAPSULE | Refills: 3 | Status: SHIPPED | OUTPATIENT
Start: 2018-03-04 | End: 2019-02-27 | Stop reason: SDUPTHER

## 2018-03-04 RX ORDER — VENLAFAXINE HYDROCHLORIDE 150 MG/1
CAPSULE, EXTENDED RELEASE ORAL
Qty: 90 CAPSULE | Refills: 3 | Status: SHIPPED | OUTPATIENT
Start: 2018-03-04 | End: 2019-02-27 | Stop reason: SDUPTHER

## 2018-03-08 PROBLEM — R51.9 HEADACHE: Status: ACTIVE | Noted: 2017-02-28

## 2018-03-08 PROBLEM — F32.1 MODERATE MAJOR DEPRESSION (HCC): Status: ACTIVE | Noted: 2017-10-31

## 2018-03-08 PROBLEM — R26.89 IMBALANCE: Status: ACTIVE | Noted: 2017-02-28

## 2018-03-08 PROBLEM — S63.641A GAMEKEEPER'S THUMB OF RIGHT HAND: Status: ACTIVE | Noted: 2017-04-11

## 2018-03-08 PROBLEM — S53.31XA GAMEKEEPER'S THUMB OF RIGHT HAND: Status: ACTIVE | Noted: 2017-04-11

## 2018-03-08 PROBLEM — J30.9 ALLERGIC RHINITIS: Status: ACTIVE | Noted: 2017-06-29

## 2018-03-08 RX ORDER — FLUTICASONE PROPIONATE 50 MCG
2 SPRAY, SUSPENSION (ML) NASAL DAILY
COMMUNITY
Start: 2017-06-29 | End: 2019-06-04 | Stop reason: SDUPTHER

## 2018-03-08 RX ORDER — IBUPROFEN 200 MG
600 CAPSULE ORAL DAILY
COMMUNITY

## 2018-03-08 RX ORDER — MULTIVITAMIN
TABLET ORAL
COMMUNITY

## 2018-03-08 RX ORDER — BIOTIN 1 MG
1000 TABLET ORAL DAILY
COMMUNITY

## 2018-03-16 DIAGNOSIS — F41.9 ANXIETY: ICD-10-CM

## 2018-03-16 RX ORDER — CLONAZEPAM 0.5 MG/1
TABLET ORAL
Qty: 30 TABLET | Refills: 0 | OUTPATIENT
Start: 2018-03-16 | End: 2018-04-18 | Stop reason: SDUPTHER

## 2018-04-18 DIAGNOSIS — F41.9 ANXIETY: ICD-10-CM

## 2018-04-18 RX ORDER — CLONAZEPAM 0.5 MG/1
TABLET ORAL
Qty: 30 TABLET | Refills: 0 | Status: SHIPPED | OUTPATIENT
Start: 2018-04-18 | End: 2018-05-16 | Stop reason: SDUPTHER

## 2018-05-16 DIAGNOSIS — K21.9 GASTROESOPHAGEAL REFLUX DISEASE, ESOPHAGITIS PRESENCE NOT SPECIFIED: Primary | ICD-10-CM

## 2018-05-16 DIAGNOSIS — F41.9 ANXIETY: ICD-10-CM

## 2018-05-16 RX ORDER — OMEPRAZOLE 40 MG/1
CAPSULE, DELAYED RELEASE ORAL
Qty: 30 CAPSULE | Refills: 5 | Status: SHIPPED | OUTPATIENT
Start: 2018-05-16 | End: 2018-11-15 | Stop reason: SDUPTHER

## 2018-05-16 RX ORDER — CLONAZEPAM 0.5 MG/1
TABLET ORAL
Qty: 30 TABLET | Refills: 0 | Status: SHIPPED | OUTPATIENT
Start: 2018-05-16 | End: 2018-06-20 | Stop reason: SDUPTHER

## 2018-06-20 DIAGNOSIS — F41.9 ANXIETY: ICD-10-CM

## 2018-06-20 RX ORDER — CLONAZEPAM 0.5 MG/1
TABLET ORAL
Qty: 30 TABLET | Refills: 0 | Status: SHIPPED | OUTPATIENT
Start: 2018-06-20 | End: 2018-07-19 | Stop reason: SDUPTHER

## 2018-07-19 DIAGNOSIS — F41.9 ANXIETY: ICD-10-CM

## 2018-07-19 RX ORDER — CLONAZEPAM 0.5 MG/1
TABLET ORAL
Qty: 30 TABLET | Refills: 0 | Status: SHIPPED | OUTPATIENT
Start: 2018-07-19 | End: 2018-08-15 | Stop reason: SDUPTHER

## 2018-08-15 DIAGNOSIS — F41.9 ANXIETY: ICD-10-CM

## 2018-08-16 RX ORDER — CLONAZEPAM 0.5 MG/1
TABLET ORAL
Qty: 30 TABLET | Refills: 0 | Status: SHIPPED | OUTPATIENT
Start: 2018-08-16 | End: 2018-09-12 | Stop reason: SDUPTHER

## 2018-08-16 NOTE — TELEPHONE ENCOUNTER
Her phone # disconnected, tried calling yolande contact daughter, busy, will keep trying, or send letter

## 2018-09-01 DIAGNOSIS — F32.A DEPRESSION, UNSPECIFIED DEPRESSION TYPE: Primary | ICD-10-CM

## 2018-09-02 RX ORDER — VENLAFAXINE HYDROCHLORIDE 75 MG/1
CAPSULE, EXTENDED RELEASE ORAL
Qty: 90 CAPSULE | Refills: 3 | Status: SHIPPED | OUTPATIENT
Start: 2018-09-02 | End: 2019-06-04

## 2018-09-12 DIAGNOSIS — F41.9 ANXIETY: ICD-10-CM

## 2018-09-13 RX ORDER — CLONAZEPAM 0.5 MG/1
TABLET ORAL
Qty: 30 TABLET | Refills: 0 | Status: SHIPPED | OUTPATIENT
Start: 2018-09-13 | End: 2018-10-10 | Stop reason: SDUPTHER

## 2018-09-13 NOTE — TELEPHONE ENCOUNTER
Patients phone disconnected and daughters # always busy  Will keep trying today, or will send letter

## 2018-10-10 DIAGNOSIS — F41.9 ANXIETY: ICD-10-CM

## 2018-10-11 RX ORDER — CLONAZEPAM 0.5 MG/1
TABLET ORAL
Qty: 30 TABLET | Refills: 0 | Status: SHIPPED | OUTPATIENT
Start: 2018-10-11 | End: 2018-11-15 | Stop reason: SDUPTHER

## 2018-11-15 DIAGNOSIS — K21.9 GASTROESOPHAGEAL REFLUX DISEASE, ESOPHAGITIS PRESENCE NOT SPECIFIED: ICD-10-CM

## 2018-11-15 DIAGNOSIS — F41.9 ANXIETY: ICD-10-CM

## 2018-11-15 NOTE — TELEPHONE ENCOUNTER
Tried calling patient ph# on file is disconnected  Tried calling emergency  [facility] with ph# [phone] and left vm for her

## 2018-11-15 NOTE — TELEPHONE ENCOUNTER
I cannot refill medication until we hear from patient    Please reach out and have her schedule appointment

## 2018-11-16 RX ORDER — OMEPRAZOLE 40 MG/1
CAPSULE, DELAYED RELEASE ORAL
Qty: 30 CAPSULE | Refills: 5 | Status: SHIPPED | OUTPATIENT
Start: 2018-11-16 | End: 2019-05-16 | Stop reason: SDUPTHER

## 2018-11-16 RX ORDER — CLONAZEPAM 0.5 MG/1
TABLET ORAL
Qty: 30 TABLET | Refills: 0 | Status: SHIPPED | OUTPATIENT
Start: 2018-11-16 | End: 2018-12-16 | Stop reason: SDUPTHER

## 2018-12-16 DIAGNOSIS — F41.9 ANXIETY: ICD-10-CM

## 2018-12-16 RX ORDER — CLONAZEPAM 0.5 MG/1
TABLET ORAL
Qty: 30 TABLET | Refills: 0 | Status: SHIPPED | OUTPATIENT
Start: 2018-12-16 | End: 2019-01-10 | Stop reason: SDUPTHER

## 2019-01-10 DIAGNOSIS — F41.9 ANXIETY: ICD-10-CM

## 2019-01-10 RX ORDER — CLONAZEPAM 0.5 MG/1
TABLET ORAL
Qty: 30 TABLET | Refills: 0 | Status: SHIPPED | OUTPATIENT
Start: 2019-01-10 | End: 2019-02-14 | Stop reason: SDUPTHER

## 2019-02-14 DIAGNOSIS — F41.9 ANXIETY: ICD-10-CM

## 2019-02-14 RX ORDER — CLONAZEPAM 0.5 MG/1
TABLET ORAL
Qty: 30 TABLET | Refills: 0 | Status: SHIPPED | OUTPATIENT
Start: 2019-02-14 | End: 2019-03-13 | Stop reason: SDUPTHER

## 2019-02-27 DIAGNOSIS — G89.4 CHRONIC PAIN SYNDROME: ICD-10-CM

## 2019-02-27 DIAGNOSIS — F32.A DEPRESSION, UNSPECIFIED DEPRESSION TYPE: ICD-10-CM

## 2019-02-28 RX ORDER — VENLAFAXINE HYDROCHLORIDE 150 MG/1
CAPSULE, EXTENDED RELEASE ORAL
Qty: 90 CAPSULE | Refills: 3 | Status: SHIPPED | OUTPATIENT
Start: 2019-02-28 | End: 2019-06-04

## 2019-02-28 RX ORDER — GABAPENTIN 300 MG/1
CAPSULE ORAL
Qty: 270 CAPSULE | Refills: 3 | Status: SHIPPED | OUTPATIENT
Start: 2019-02-28 | End: 2019-12-19 | Stop reason: SDUPTHER

## 2019-03-13 DIAGNOSIS — F41.9 ANXIETY: ICD-10-CM

## 2019-03-14 RX ORDER — CLONAZEPAM 0.5 MG/1
TABLET ORAL
Qty: 30 TABLET | Refills: 0 | Status: SHIPPED | OUTPATIENT
Start: 2019-03-14 | End: 2019-04-10 | Stop reason: SDUPTHER

## 2019-03-22 ENCOUNTER — TELEPHONE (OUTPATIENT)
Dept: FAMILY MEDICINE CLINIC | Facility: CLINIC | Age: 64
End: 2019-03-22

## 2019-04-10 DIAGNOSIS — F41.9 ANXIETY: ICD-10-CM

## 2019-04-11 RX ORDER — CLONAZEPAM 0.5 MG/1
TABLET ORAL
Qty: 30 TABLET | Refills: 0 | Status: SHIPPED | OUTPATIENT
Start: 2019-04-11 | End: 2019-05-16 | Stop reason: SDUPTHER

## 2019-05-16 DIAGNOSIS — K21.9 GASTROESOPHAGEAL REFLUX DISEASE, ESOPHAGITIS PRESENCE NOT SPECIFIED: ICD-10-CM

## 2019-05-16 DIAGNOSIS — F41.9 ANXIETY: ICD-10-CM

## 2019-05-16 RX ORDER — OMEPRAZOLE 40 MG/1
CAPSULE, DELAYED RELEASE ORAL
Qty: 30 CAPSULE | Refills: 5 | Status: SHIPPED | OUTPATIENT
Start: 2019-05-16 | End: 2019-10-15 | Stop reason: SDUPTHER

## 2019-05-16 RX ORDER — CLONAZEPAM 0.5 MG/1
TABLET ORAL
Qty: 30 TABLET | Refills: 0 | Status: SHIPPED | OUTPATIENT
Start: 2019-05-16 | End: 2019-06-12 | Stop reason: SDUPTHER

## 2019-06-03 ENCOUNTER — TELEPHONE (OUTPATIENT)
Dept: FAMILY MEDICINE CLINIC | Facility: CLINIC | Age: 64
End: 2019-06-03

## 2019-06-03 DIAGNOSIS — Z12.11 ENCOUNTER FOR SCREENING COLONOSCOPY: Primary | ICD-10-CM

## 2019-06-04 ENCOUNTER — OFFICE VISIT (OUTPATIENT)
Dept: FAMILY MEDICINE CLINIC | Facility: CLINIC | Age: 64
End: 2019-06-04
Payer: COMMERCIAL

## 2019-06-04 VITALS
DIASTOLIC BLOOD PRESSURE: 72 MMHG | WEIGHT: 164.2 LBS | HEART RATE: 76 BPM | TEMPERATURE: 97.8 F | HEIGHT: 63 IN | BODY MASS INDEX: 29.09 KG/M2 | SYSTOLIC BLOOD PRESSURE: 114 MMHG

## 2019-06-04 DIAGNOSIS — Z78.0 ASYMPTOMATIC POSTMENOPAUSAL STATE: ICD-10-CM

## 2019-06-04 DIAGNOSIS — F32.1 MODERATE MAJOR DEPRESSION (HCC): ICD-10-CM

## 2019-06-04 DIAGNOSIS — M17.0 OSTEOARTHRITIS OF BOTH KNEES, UNSPECIFIED OSTEOARTHRITIS TYPE: ICD-10-CM

## 2019-06-04 DIAGNOSIS — Z12.31 ENCOUNTER FOR SCREENING MAMMOGRAM FOR BREAST CANCER: ICD-10-CM

## 2019-06-04 DIAGNOSIS — R79.89 ELEVATED TSH: ICD-10-CM

## 2019-06-04 DIAGNOSIS — J30.9 ALLERGIC RHINITIS, UNSPECIFIED SEASONALITY, UNSPECIFIED TRIGGER: ICD-10-CM

## 2019-06-04 DIAGNOSIS — G89.29 CHRONIC BILATERAL LOW BACK PAIN WITHOUT SCIATICA: ICD-10-CM

## 2019-06-04 DIAGNOSIS — Z12.12 SCREENING FOR COLORECTAL CANCER: ICD-10-CM

## 2019-06-04 DIAGNOSIS — M54.50 CHRONIC BILATERAL LOW BACK PAIN WITHOUT SCIATICA: ICD-10-CM

## 2019-06-04 DIAGNOSIS — Z13.6 SCREENING FOR CARDIOVASCULAR CONDITION: ICD-10-CM

## 2019-06-04 DIAGNOSIS — Z11.59 NEED FOR HEPATITIS C SCREENING TEST: ICD-10-CM

## 2019-06-04 DIAGNOSIS — Z12.11 SCREENING FOR COLORECTAL CANCER: ICD-10-CM

## 2019-06-04 DIAGNOSIS — R53.83 FATIGUE, UNSPECIFIED TYPE: ICD-10-CM

## 2019-06-04 DIAGNOSIS — F32.A DEPRESSION, UNSPECIFIED DEPRESSION TYPE: ICD-10-CM

## 2019-06-04 DIAGNOSIS — Z00.00 MEDICARE ANNUAL WELLNESS VISIT, INITIAL: Primary | ICD-10-CM

## 2019-06-04 PROCEDURE — 1101F PT FALLS ASSESS-DOCD LE1/YR: CPT | Performed by: FAMILY MEDICINE

## 2019-06-04 PROCEDURE — 3008F BODY MASS INDEX DOCD: CPT | Performed by: FAMILY MEDICINE

## 2019-06-04 PROCEDURE — G0438 PPPS, INITIAL VISIT: HCPCS | Performed by: FAMILY MEDICINE

## 2019-06-04 PROCEDURE — 1170F FXNL STATUS ASSESSED: CPT | Performed by: FAMILY MEDICINE

## 2019-06-04 PROCEDURE — 3725F SCREEN DEPRESSION PERFORMED: CPT | Performed by: FAMILY MEDICINE

## 2019-06-04 PROCEDURE — 1160F RVW MEDS BY RX/DR IN RCRD: CPT | Performed by: FAMILY MEDICINE

## 2019-06-04 PROCEDURE — 1125F AMNT PAIN NOTED PAIN PRSNT: CPT | Performed by: FAMILY MEDICINE

## 2019-06-04 PROCEDURE — 99214 OFFICE O/P EST MOD 30 MIN: CPT | Performed by: FAMILY MEDICINE

## 2019-06-04 RX ORDER — MELOXICAM 7.5 MG/1
7.5 TABLET ORAL DAILY
Qty: 30 TABLET | Refills: 3 | Status: SHIPPED | OUTPATIENT
Start: 2019-06-04 | End: 2019-08-31 | Stop reason: SDUPTHER

## 2019-06-04 RX ORDER — FLUTICASONE PROPIONATE 50 MCG
2 SPRAY, SUSPENSION (ML) NASAL DAILY
Qty: 1 BOTTLE | Refills: 5 | Status: SHIPPED | OUTPATIENT
Start: 2019-06-04 | End: 2021-01-20 | Stop reason: SDUPTHER

## 2019-06-04 RX ORDER — VENLAFAXINE HYDROCHLORIDE 75 MG/1
225 CAPSULE, EXTENDED RELEASE ORAL DAILY
Qty: 270 CAPSULE | Refills: 3 | Status: SHIPPED | OUTPATIENT
Start: 2019-06-04 | End: 2019-06-06

## 2019-06-06 ENCOUNTER — TELEPHONE (OUTPATIENT)
Dept: FAMILY MEDICINE CLINIC | Facility: CLINIC | Age: 64
End: 2019-06-06

## 2019-06-06 DIAGNOSIS — F32.A DEPRESSION, UNSPECIFIED DEPRESSION TYPE: ICD-10-CM

## 2019-06-06 RX ORDER — VENLAFAXINE HYDROCHLORIDE 75 MG/1
75 CAPSULE, EXTENDED RELEASE ORAL DAILY
Qty: 90 CAPSULE | Refills: 3 | Status: SHIPPED | OUTPATIENT
Start: 2019-06-06 | End: 2020-03-10

## 2019-06-06 RX ORDER — VENLAFAXINE HYDROCHLORIDE 150 MG/1
CAPSULE, EXTENDED RELEASE ORAL
Qty: 90 CAPSULE | Refills: 3 | Status: SHIPPED | OUTPATIENT
Start: 2019-06-06 | End: 2020-03-10

## 2019-06-12 DIAGNOSIS — F41.9 ANXIETY: ICD-10-CM

## 2019-06-13 RX ORDER — CLONAZEPAM 0.5 MG/1
TABLET ORAL
Qty: 30 TABLET | Refills: 0 | Status: SHIPPED | OUTPATIENT
Start: 2019-06-13 | End: 2019-07-08 | Stop reason: SDUPTHER

## 2019-07-08 DIAGNOSIS — F41.9 ANXIETY: ICD-10-CM

## 2019-07-08 RX ORDER — CLONAZEPAM 0.5 MG/1
0.5 TABLET ORAL
Qty: 30 TABLET | Refills: 0 | Status: SHIPPED | OUTPATIENT
Start: 2019-07-08 | End: 2019-08-12 | Stop reason: SDUPTHER

## 2019-07-27 DIAGNOSIS — F41.9 ANXIETY: ICD-10-CM

## 2019-07-28 RX ORDER — CLONAZEPAM 0.5 MG/1
0.5 TABLET ORAL
OUTPATIENT
Start: 2019-07-28

## 2019-08-12 ENCOUNTER — TELEPHONE (OUTPATIENT)
Dept: FAMILY MEDICINE CLINIC | Facility: CLINIC | Age: 64
End: 2019-08-12

## 2019-08-12 DIAGNOSIS — F41.9 ANXIETY: ICD-10-CM

## 2019-08-12 RX ORDER — CLONAZEPAM 0.5 MG/1
0.5 TABLET ORAL
Qty: 30 TABLET | Refills: 0 | Status: SHIPPED | OUTPATIENT
Start: 2019-08-12 | End: 2019-09-11 | Stop reason: SDUPTHER

## 2019-08-28 ENCOUNTER — ANESTHESIA EVENT (OUTPATIENT)
Dept: GASTROENTEROLOGY | Facility: AMBULARY SURGERY CENTER | Age: 64
End: 2019-08-28

## 2019-08-28 DIAGNOSIS — F41.9 ANXIETY: ICD-10-CM

## 2019-08-28 RX ORDER — CLONAZEPAM 0.5 MG/1
0.5 TABLET ORAL
OUTPATIENT
Start: 2019-08-28

## 2019-08-31 DIAGNOSIS — M17.0 OSTEOARTHRITIS OF BOTH KNEES, UNSPECIFIED OSTEOARTHRITIS TYPE: ICD-10-CM

## 2019-09-01 RX ORDER — MELOXICAM 7.5 MG/1
TABLET ORAL
Qty: 30 TABLET | Refills: 1 | Status: SHIPPED | OUTPATIENT
Start: 2019-09-01 | End: 2019-10-30 | Stop reason: SDUPTHER

## 2019-09-11 ENCOUNTER — ANESTHESIA (OUTPATIENT)
Dept: GASTROENTEROLOGY | Facility: AMBULARY SURGERY CENTER | Age: 64
End: 2019-09-11

## 2019-09-11 ENCOUNTER — HOSPITAL ENCOUNTER (OUTPATIENT)
Dept: GASTROENTEROLOGY | Facility: AMBULARY SURGERY CENTER | Age: 64
Setting detail: OUTPATIENT SURGERY
Discharge: HOME | End: 2019-09-11
Attending: COLON & RECTAL SURGERY | Admitting: COLON & RECTAL SURGERY
Payer: COMMERCIAL

## 2019-09-11 VITALS
HEART RATE: 60 BPM | HEIGHT: 63 IN | DIASTOLIC BLOOD PRESSURE: 60 MMHG | WEIGHT: 163 LBS | OXYGEN SATURATION: 99 % | SYSTOLIC BLOOD PRESSURE: 100 MMHG | TEMPERATURE: 98 F | RESPIRATION RATE: 16 BRPM | BODY MASS INDEX: 28.88 KG/M2

## 2019-09-11 DIAGNOSIS — Z12.11 SCREENING FOR COLON CANCER: ICD-10-CM

## 2019-09-11 DIAGNOSIS — F41.9 ANXIETY: ICD-10-CM

## 2019-09-11 PROCEDURE — 99203 OFFICE O/P NEW LOW 30 MIN: CPT | Performed by: COLON & RECTAL SURGERY

## 2019-09-11 PROCEDURE — 88305 TISSUE EXAM BY PATHOLOGIST: CPT | Performed by: PATHOLOGY

## 2019-09-11 PROCEDURE — 45385 COLONOSCOPY W/LESION REMOVAL: CPT | Performed by: COLON & RECTAL SURGERY

## 2019-09-11 RX ORDER — PROPOFOL 10 MG/ML
INJECTION, EMULSION INTRAVENOUS AS NEEDED
Status: DISCONTINUED | OUTPATIENT
Start: 2019-09-11 | End: 2019-09-11 | Stop reason: SURG

## 2019-09-11 RX ORDER — CLONAZEPAM 0.5 MG/1
0.5 TABLET ORAL
Qty: 30 TABLET | Refills: 0 | Status: SHIPPED | OUTPATIENT
Start: 2019-09-11 | End: 2019-09-26 | Stop reason: SDUPTHER

## 2019-09-11 RX ORDER — SODIUM CHLORIDE, SODIUM LACTATE, POTASSIUM CHLORIDE, CALCIUM CHLORIDE 600; 310; 30; 20 MG/100ML; MG/100ML; MG/100ML; MG/100ML
100 INJECTION, SOLUTION INTRAVENOUS CONTINUOUS
Status: DISCONTINUED | OUTPATIENT
Start: 2019-09-11 | End: 2019-09-15 | Stop reason: HOSPADM

## 2019-09-11 RX ADMIN — PROPOFOL 30 MG: 10 INJECTION, EMULSION INTRAVENOUS at 10:22

## 2019-09-11 RX ADMIN — PROPOFOL 80 MG: 10 INJECTION, EMULSION INTRAVENOUS at 09:59

## 2019-09-11 RX ADMIN — PROPOFOL 20 MG: 10 INJECTION, EMULSION INTRAVENOUS at 10:16

## 2019-09-11 RX ADMIN — PROPOFOL 20 MG: 10 INJECTION, EMULSION INTRAVENOUS at 10:25

## 2019-09-11 RX ADMIN — PROPOFOL 50 MG: 10 INJECTION, EMULSION INTRAVENOUS at 10:09

## 2019-09-11 RX ADMIN — PROPOFOL 20 MG: 10 INJECTION, EMULSION INTRAVENOUS at 10:02

## 2019-09-11 RX ADMIN — PROPOFOL 20 MG: 10 INJECTION, EMULSION INTRAVENOUS at 10:19

## 2019-09-11 RX ADMIN — SODIUM CHLORIDE, SODIUM LACTATE, POTASSIUM CHLORIDE, AND CALCIUM CHLORIDE: .6; .31; .03; .02 INJECTION, SOLUTION INTRAVENOUS at 09:01

## 2019-09-11 RX ADMIN — PROPOFOL 20 MG: 10 INJECTION, EMULSION INTRAVENOUS at 10:05

## 2019-09-11 RX ADMIN — PROPOFOL 30 MG: 10 INJECTION, EMULSION INTRAVENOUS at 10:12

## 2019-09-11 NOTE — H&P
History and Physical   Colon and Rectal Surgery   Hyun Neri 70 y o  female MRN: [id number]  Unit/Bed#:  Encounter: [id number]  09/11/19   8:56 AM      No chief complaint on file  History of Present Illness   HPI:  Hyun Neri is a 70 y o  female who presents for colonoscopy, recent cologuard positive  Denies nausea/vomiting/abdominal pain, change in bowel habits, rectal bleeding, or other constitutional symptoms  Last colonoscopy she thinks 2005 with polyps, she is overdue, not schedule her recall colonoscopy  Historical Information   Past Medical History:   Diagnosis Date    GERD (gastroesophageal reflux disease)     Psychiatric disorder     anxiety, depression     Past Surgical History:   Procedure Laterality Date    BLADDER SUSPENSION  1980    Mesh    GALLBLADDER SURGERY      CO LAP,CHOLECYSTECTOMY N/A 7/11/2016    Procedure: LAPAROSCOPIC CHOLECYSTECTOMY ;  Surgeon: Suyapa Eduardo DO;  Location: [facility];  Service: General  Last assessed: 5/24/16    TUBAL LIGATION  1976    URETHRA SURGERY         Meds/Allergies       (Not in a hospital admission)      Current Outpatient Medications:     Calcium 600 MG tablet, Take by mouth, Disp: , Rfl:     Cholecalciferol (VITAMIN D3) 1000 units CAPS, Take by mouth, Disp: , Rfl:     clonazePAM (KlonoPIN) 0 5 mg tablet, Take 1 tablet (0 5 mg total) by mouth daily at bedtime, Disp: 30 tablet, Rfl: 0    cyanocobalamin 1000 MCG tablet, Take by mouth, Disp: , Rfl:     fluticasone (FLONASE) 50 mcg/act nasal spray, 2 sprays into each nostril daily, Disp: 1 Bottle, Rfl: 5    gabapentin (NEURONTIN) 300 mg capsule, TAKE ONE CAPSULE BY MOUTH THREE TIMES A DAY, Disp: 270 capsule, Rfl: 3    Magnesium Oxide -Mg Supplement 400 MG CAPS, Take 1 capsule by mouth daily, Disp: , Rfl:     Melatonin 1 MG CAPS, Take by mouth, Disp: , Rfl:     meloxicam (MOBIC) 7 5 mg tablet, Take 1 tablet by mouth daily  , Disp: 30 tablet, Rfl: 1    Multiple Vitamin (MULTI-VITAMIN DAILY) TABS, Take by mouth, Disp: , Rfl:     omeprazole (PriLOSEC) 40 MG capsule, TAKE ONE CAPSULE BY MOUTH EVERY DAY, Disp: 30 capsule, Rfl: 5    venlafaxine (EFFEXOR-XR) 150 mg 24 hr capsule, 1 cap q am, Disp: 90 capsule, Rfl: 3    venlafaxine (EFFEXOR-XR) 75 mg 24 hr capsule, Take 1 capsule (75 mg total) by mouth daily 1 capsule q evening, Disp: 90 capsule, Rfl: 3    Allergies   Allergen Reactions    Vancomycin Hives    Penicillins Hives and Rash         Social History   Social History     Substance and Sexual Activity   Alcohol Use No     Social History     Substance and Sexual Activity   Drug Use No     Social History     Tobacco Use   Smoking Status Current Every Day Smoker    Packs/day: 0 25    Years: 15 00    Pack years: 3 75    Types: Cigarettes         Family History:   Family History   Problem Relation Age of Onset    Other Mother         Methicillin Resistant Staphylococcus Aureus Infection    Mental illness Mother     Lung cancer Father          Objective     Current Vitals:      No intake or output data in the 24 hours ending 09/11/19 0856    Physical Exam:  General:no distress  ENT:moist mucus membranes  Neck:supple  Pulm:no increased work of breathing, clear bilateral  CV:sinus  Abdomen:soft,nontender  Extremities:no edema        ASSESSMENT:    Cologuard+  Screening colonoscopy    Screening colonoscopy,discussed in a face-to-face, personal, informed consent process, the benefits, alternatives, risks including not limited to bleeding, missed lesion, perforation requiring emergent surgery discussed/understood        PLAN:  Colonoscopy

## 2019-09-11 NOTE — ANESTHESIA POSTPROCEDURE EVALUATION
Post-Op Assessment Note    CV Status:  Stable  Pain Score: 0    Pain management: adequate     Mental Status:  Alert and awake   Hydration Status:  Euvolemic   PONV Controlled:  Controlled   Airway Patency:  Patent   Post Op Vitals Reviewed: Yes      Staff: CRNA           /55 (09/11/19 1033)    Temp      Pulse 67 (09/11/19 1033)   Resp 15 (09/11/19 1033)    SpO2 97 % (09/11/19 1033)

## 2019-09-11 NOTE — ANESTHESIA PREPROCEDURE EVALUATION
Review of Systems/Medical History  Patient summary reviewed  Chart reviewed  No history of anesthetic complications     Cardiovascular  Negative cardio ROS Exercise tolerance (METS): >4,     Pulmonary  Negative pulmonary ROS        GI/Hepatic    GERD , Bowel prep       Negative  ROS        Endo/Other  Negative endo/other ROS      GYN  Negative gynecology ROS          Hematology  Negative hematology ROS      Musculoskeletal    Arthritis     Neurology  Negative neurology ROS      Psychology   Anxiety, Depression , being treated for depression,              Physical Exam    Airway    Mallampati score: II  TM Distance: >3 FB  Neck ROM: full     Dental   upper dentures and lower dentures,     Cardiovascular  Comment: Negative ROS,     Pulmonary      Other Findings        Anesthesia Plan  ASA Score- 2     Anesthesia Type- IV sedation with anesthesia with ASA Monitors  Additional Monitors:   Airway Plan:         Plan Factors-    Induction- intravenous  Postoperative Plan-     Informed Consent- Anesthetic plan and risks discussed with patient  I personally reviewed this patient with the CRNA  Discussed and agreed on the Anesthesia Plan with the CRNA  Mariel Aguila

## 2019-09-11 NOTE — TELEPHONE ENCOUNTER
It can be sent electronically, she usually gets 30 days and the mail orders have been sending a week ahead to make sure patients done run out

## 2019-09-11 NOTE — TELEPHONE ENCOUNTER
Are they asking for a printed script? Are they asking for more than #30 given that they are "mailing it out"?

## 2019-09-18 DIAGNOSIS — F41.9 ANXIETY: ICD-10-CM

## 2019-09-18 RX ORDER — CLONAZEPAM 0.5 MG/1
0.5 TABLET ORAL
OUTPATIENT
Start: 2019-09-18

## 2019-09-26 DIAGNOSIS — F41.9 ANXIETY: ICD-10-CM

## 2019-09-26 RX ORDER — CLONAZEPAM 0.5 MG/1
0.5 TABLET ORAL
Qty: 90 TABLET | Refills: 0 | Status: SHIPPED | OUTPATIENT
Start: 2019-09-26 | End: 2019-10-30 | Stop reason: SDUPTHER

## 2019-09-26 NOTE — TELEPHONE ENCOUNTER
As per pill pack please send script early do to packaging time     Checked and ok in pdmp, last fill 09/17

## 2019-10-15 ENCOUNTER — TELEPHONE (OUTPATIENT)
Dept: FAMILY MEDICINE CLINIC | Facility: CLINIC | Age: 64
End: 2019-10-15

## 2019-10-15 DIAGNOSIS — K21.9 GASTROESOPHAGEAL REFLUX DISEASE, ESOPHAGITIS PRESENCE NOT SPECIFIED: ICD-10-CM

## 2019-10-15 RX ORDER — OMEPRAZOLE 40 MG/1
40 CAPSULE, DELAYED RELEASE ORAL DAILY
Qty: 30 CAPSULE | Refills: 5 | Status: SHIPPED | OUTPATIENT
Start: 2019-10-15 | End: 2020-03-10

## 2019-10-15 NOTE — TELEPHONE ENCOUNTER
1) pt with multiple polyps but no cancer  Needs repeat colonoscopy in 1 year  2) I am not sure that we can mail med records (problem list and med list) to a patient due to [address] [place] [address] St - can you find out?

## 2019-10-15 NOTE — TELEPHONE ENCOUNTER
Asking for results of her Colonoscopy 9/11     She also needs a copy of medical conditions and medications she takes  Would like mailed to her, she has no transportation    It is for Medicare/Medicaid renewal

## 2019-10-16 NOTE — TELEPHONE ENCOUNTER
Patient notified of test results and repeat directions  Patient is requesting us to mail med/problem list from our office to potentially new insurance  I requested a records relase from patient allowing us to mail information to potential  insurance company  I also offered [employer] so she can view, print and mail to whomever she would like  She will consider each option but for the time being, she is aware med list and problem list are at  waiting for her to

## 2019-10-21 ENCOUNTER — APPOINTMENT (OUTPATIENT)
Dept: LAB | Facility: CLINIC | Age: 64
End: 2019-10-21
Payer: COMMERCIAL

## 2019-10-21 DIAGNOSIS — F32.1 MODERATE MAJOR DEPRESSION (HCC): ICD-10-CM

## 2019-10-21 DIAGNOSIS — Z11.59 NEED FOR HEPATITIS C SCREENING TEST: ICD-10-CM

## 2019-10-21 DIAGNOSIS — Z13.6 SCREENING FOR CARDIOVASCULAR CONDITION: ICD-10-CM

## 2019-10-21 LAB
ALBUMIN SERPL BCP-MCNC: 4 G/DL (ref 3.5–5)
ALP SERPL-CCNC: 146 U/L (ref 46–116)
ALT SERPL W P-5'-P-CCNC: 16 U/L (ref 12–78)
ANION GAP SERPL CALCULATED.3IONS-SCNC: 7 MMOL/L (ref 4–13)
AST SERPL W P-5'-P-CCNC: 24 U/L (ref 5–45)
BASOPHILS # BLD AUTO: 0.04 THOUSANDS/ΜL (ref 0–0.1)
BASOPHILS NFR BLD AUTO: 1 % (ref 0–1)
BILIRUB SERPL-MCNC: 0.35 MG/DL (ref 0.2–1)
BUN SERPL-MCNC: 11 MG/DL (ref 5–25)
CALCIUM SERPL-MCNC: 9.7 MG/DL (ref 8.3–10.1)
CHLORIDE SERPL-SCNC: 107 MMOL/L (ref 100–108)
CHOLEST SERPL-MCNC: 337 MG/DL (ref 50–200)
CO2 SERPL-SCNC: 27 MMOL/L (ref 21–32)
CREAT SERPL-MCNC: 0.96 MG/DL (ref 0.6–1.3)
EOSINOPHIL # BLD AUTO: 0.12 THOUSAND/ΜL (ref 0–0.61)
EOSINOPHIL NFR BLD AUTO: 2 % (ref 0–6)
ERYTHROCYTE [DISTWIDTH] IN BLOOD BY AUTOMATED COUNT: 15.4 % (ref 11.6–15.1)
GFR SERPL CREATININE-BSD FRML MDRD: 62 ML/MIN/1.73SQ M
GLUCOSE P FAST SERPL-MCNC: 75 MG/DL (ref 65–99)
HCT VFR BLD AUTO: 43.4 % (ref 34.8–46.1)
HCV AB SER QL: NORMAL
HDLC SERPL-MCNC: 49 MG/DL
HGB BLD-MCNC: 12.9 G/DL (ref 11.5–15.4)
IMM GRANULOCYTES # BLD AUTO: 0.02 THOUSAND/UL (ref 0–0.2)
IMM GRANULOCYTES NFR BLD AUTO: 0 % (ref 0–2)
LDLC SERPL CALC-MCNC: 238 MG/DL (ref 0–100)
LYMPHOCYTES # BLD AUTO: 2.78 THOUSANDS/ΜL (ref 0.6–4.47)
LYMPHOCYTES NFR BLD AUTO: 41 % (ref 14–44)
MCH RBC QN AUTO: 28.2 PG (ref 26.8–34.3)
MCHC RBC AUTO-ENTMCNC: 29.7 G/DL (ref 31.4–37.4)
MCV RBC AUTO: 95 FL (ref 82–98)
MONOCYTES # BLD AUTO: 0.49 THOUSAND/ΜL (ref 0.17–1.22)
MONOCYTES NFR BLD AUTO: 7 % (ref 4–12)
NEUTROPHILS # BLD AUTO: 3.26 THOUSANDS/ΜL (ref 1.85–7.62)
NEUTS SEG NFR BLD AUTO: 49 % (ref 43–75)
NONHDLC SERPL-MCNC: 288 MG/DL
NRBC BLD AUTO-RTO: 0 /100 WBCS
PLATELET # BLD AUTO: 381 THOUSANDS/UL (ref 149–390)
PMV BLD AUTO: 10.3 FL (ref 8.9–12.7)
POTASSIUM SERPL-SCNC: 4.6 MMOL/L (ref 3.5–5.3)
PROT SERPL-MCNC: 7.6 G/DL (ref 6.4–8.2)
RBC # BLD AUTO: 4.58 MILLION/UL (ref 3.81–5.12)
SODIUM SERPL-SCNC: 141 MMOL/L (ref 136–145)
T4 FREE SERPL-MCNC: 0.79 NG/DL (ref 0.76–1.46)
TRIGL SERPL-MCNC: 250 MG/DL
TSH SERPL DL<=0.05 MIU/L-ACNC: 4.27 UIU/ML (ref 0.36–3.74)
WBC # BLD AUTO: 6.71 THOUSAND/UL (ref 4.31–10.16)

## 2019-10-21 PROCEDURE — 84443 ASSAY THYROID STIM HORMONE: CPT

## 2019-10-21 PROCEDURE — 80053 COMPREHEN METABOLIC PANEL: CPT

## 2019-10-21 PROCEDURE — 84439 ASSAY OF FREE THYROXINE: CPT

## 2019-10-21 PROCEDURE — 86803 HEPATITIS C AB TEST: CPT

## 2019-10-21 PROCEDURE — 80061 LIPID PANEL: CPT

## 2019-10-21 PROCEDURE — 85025 COMPLETE CBC W/AUTO DIFF WBC: CPT

## 2019-10-21 PROCEDURE — 36415 COLL VENOUS BLD VENIPUNCTURE: CPT

## 2019-10-22 DIAGNOSIS — E78.00 HYPERCHOLESTEROLEMIA: Primary | ICD-10-CM

## 2019-10-22 RX ORDER — ATORVASTATIN CALCIUM 20 MG/1
20 TABLET, FILM COATED ORAL DAILY
Qty: 90 TABLET | Refills: 3 | Status: SHIPPED | OUTPATIENT
Start: 2019-10-22 | End: 2020-09-07

## 2019-10-23 DIAGNOSIS — F32.1 MODERATE MAJOR DEPRESSION (HCC): Primary | ICD-10-CM

## 2019-10-23 DIAGNOSIS — E78.00 HYPERCHOLESTEROLEMIA: ICD-10-CM

## 2019-10-30 DIAGNOSIS — M17.0 OSTEOARTHRITIS OF BOTH KNEES, UNSPECIFIED OSTEOARTHRITIS TYPE: ICD-10-CM

## 2019-10-30 DIAGNOSIS — F41.9 ANXIETY: ICD-10-CM

## 2019-10-30 RX ORDER — CLONAZEPAM 0.5 MG/1
0.5 TABLET ORAL
Qty: 30 TABLET | Refills: 1 | Status: SHIPPED | OUTPATIENT
Start: 2019-10-30 | End: 2020-01-10

## 2019-10-30 RX ORDER — MELOXICAM 7.5 MG/1
TABLET ORAL
Qty: 30 TABLET | Refills: 2 | Status: SHIPPED | OUTPATIENT
Start: 2019-10-30 | End: 2020-01-10

## 2019-12-19 DIAGNOSIS — G89.4 CHRONIC PAIN SYNDROME: ICD-10-CM

## 2019-12-19 RX ORDER — GABAPENTIN 300 MG/1
CAPSULE ORAL
Qty: 270 CAPSULE | Refills: 0 | Status: SHIPPED | OUTPATIENT
Start: 2019-12-19 | End: 2020-03-10

## 2020-01-10 DIAGNOSIS — F41.9 ANXIETY: ICD-10-CM

## 2020-01-10 DIAGNOSIS — M17.0 OSTEOARTHRITIS OF BOTH KNEES, UNSPECIFIED OSTEOARTHRITIS TYPE: ICD-10-CM

## 2020-01-10 RX ORDER — CLONAZEPAM 0.5 MG/1
0.5 TABLET ORAL
Qty: 30 TABLET | Refills: 0 | Status: SHIPPED | OUTPATIENT
Start: 2020-01-10 | End: 2020-02-27 | Stop reason: SDUPTHER

## 2020-01-10 RX ORDER — MELOXICAM 7.5 MG/1
TABLET ORAL
Qty: 30 TABLET | Refills: 0 | Status: SHIPPED | OUTPATIENT
Start: 2020-01-10 | End: 2020-02-10

## 2020-02-10 DIAGNOSIS — F41.9 ANXIETY: ICD-10-CM

## 2020-02-10 DIAGNOSIS — M17.0 OSTEOARTHRITIS OF BOTH KNEES, UNSPECIFIED OSTEOARTHRITIS TYPE: ICD-10-CM

## 2020-02-10 RX ORDER — MELOXICAM 7.5 MG/1
TABLET ORAL
Qty: 30 TABLET | Refills: 0 | Status: SHIPPED | OUTPATIENT
Start: 2020-02-10 | End: 2020-04-07 | Stop reason: SDUPTHER

## 2020-02-10 RX ORDER — CLONAZEPAM 0.5 MG/1
TABLET ORAL
OUTPATIENT
Start: 2020-02-10

## 2020-02-13 ENCOUNTER — PATIENT OUTREACH (OUTPATIENT)
Dept: FAMILY MEDICINE CLINIC | Facility: CLINIC | Age: 65
End: 2020-02-13

## 2020-02-13 ENCOUNTER — APPOINTMENT (OUTPATIENT)
Dept: LAB | Facility: CLINIC | Age: 65
End: 2020-02-13
Payer: COMMERCIAL

## 2020-02-13 ENCOUNTER — OFFICE VISIT (OUTPATIENT)
Dept: FAMILY MEDICINE CLINIC | Facility: CLINIC | Age: 65
End: 2020-02-13
Payer: COMMERCIAL

## 2020-02-13 VITALS
WEIGHT: 163.8 LBS | SYSTOLIC BLOOD PRESSURE: 120 MMHG | DIASTOLIC BLOOD PRESSURE: 80 MMHG | HEART RATE: 64 BPM | HEIGHT: 63 IN | BODY MASS INDEX: 29.02 KG/M2 | TEMPERATURE: 98.3 F

## 2020-02-13 DIAGNOSIS — M54.50 CHRONIC BILATERAL LOW BACK PAIN WITHOUT SCIATICA: ICD-10-CM

## 2020-02-13 DIAGNOSIS — F32.1 MODERATE MAJOR DEPRESSION (HCC): ICD-10-CM

## 2020-02-13 DIAGNOSIS — G89.29 CHRONIC BILATERAL LOW BACK PAIN WITHOUT SCIATICA: ICD-10-CM

## 2020-02-13 DIAGNOSIS — R05.9 COUGH: ICD-10-CM

## 2020-02-13 DIAGNOSIS — E78.00 HYPERCHOLESTEROLEMIA: ICD-10-CM

## 2020-02-13 DIAGNOSIS — R53.83 FATIGUE, UNSPECIFIED TYPE: ICD-10-CM

## 2020-02-13 DIAGNOSIS — R79.89 ELEVATED TSH: ICD-10-CM

## 2020-02-13 DIAGNOSIS — F32.1 MODERATE MAJOR DEPRESSION (HCC): Primary | ICD-10-CM

## 2020-02-13 DIAGNOSIS — Z12.11 SCREENING FOR COLON CANCER: ICD-10-CM

## 2020-02-13 LAB
ALBUMIN SERPL BCP-MCNC: 3.5 G/DL (ref 3.5–5)
ALP SERPL-CCNC: 159 U/L (ref 46–116)
ALT SERPL W P-5'-P-CCNC: 14 U/L (ref 12–78)
ANION GAP SERPL CALCULATED.3IONS-SCNC: 5 MMOL/L (ref 4–13)
AST SERPL W P-5'-P-CCNC: 20 U/L (ref 5–45)
BILIRUB SERPL-MCNC: 0.46 MG/DL (ref 0.2–1)
BUN SERPL-MCNC: 9 MG/DL (ref 5–25)
CALCIUM SERPL-MCNC: 9.4 MG/DL (ref 8.3–10.1)
CHLORIDE SERPL-SCNC: 110 MMOL/L (ref 100–108)
CHOLEST SERPL-MCNC: 190 MG/DL (ref 50–200)
CO2 SERPL-SCNC: 26 MMOL/L (ref 21–32)
CREAT SERPL-MCNC: 0.95 MG/DL (ref 0.6–1.3)
GFR SERPL CREATININE-BSD FRML MDRD: 63 ML/MIN/1.73SQ M
GLUCOSE P FAST SERPL-MCNC: 79 MG/DL (ref 65–99)
HDLC SERPL-MCNC: 51 MG/DL
LDLC SERPL CALC-MCNC: 96 MG/DL (ref 0–100)
NONHDLC SERPL-MCNC: 139 MG/DL
POTASSIUM SERPL-SCNC: 4.5 MMOL/L (ref 3.5–5.3)
PROT SERPL-MCNC: 7.2 G/DL (ref 6.4–8.2)
SODIUM SERPL-SCNC: 141 MMOL/L (ref 136–145)
TRIGL SERPL-MCNC: 216 MG/DL
TSH SERPL DL<=0.05 MIU/L-ACNC: 3.57 UIU/ML (ref 0.36–3.74)

## 2020-02-13 PROCEDURE — 84443 ASSAY THYROID STIM HORMONE: CPT

## 2020-02-13 PROCEDURE — 1036F TOBACCO NON-USER: CPT | Performed by: FAMILY MEDICINE

## 2020-02-13 PROCEDURE — 80053 COMPREHEN METABOLIC PANEL: CPT

## 2020-02-13 PROCEDURE — 1160F RVW MEDS BY RX/DR IN RCRD: CPT | Performed by: FAMILY MEDICINE

## 2020-02-13 PROCEDURE — 4040F PNEUMOC VAC/ADMIN/RCVD: CPT | Performed by: FAMILY MEDICINE

## 2020-02-13 PROCEDURE — 80061 LIPID PANEL: CPT

## 2020-02-13 PROCEDURE — 99214 OFFICE O/P EST MOD 30 MIN: CPT | Performed by: FAMILY MEDICINE

## 2020-02-13 PROCEDURE — 36415 COLL VENOUS BLD VENIPUNCTURE: CPT

## 2020-02-13 RX ORDER — BENZONATATE 200 MG/1
200 CAPSULE ORAL 3 TIMES DAILY PRN
Qty: 90 CAPSULE | Refills: 1 | Status: SHIPPED | OUTPATIENT
Start: 2020-02-13 | End: 2020-10-27

## 2020-02-13 RX ORDER — LORATADINE 10 MG/1
10 TABLET ORAL DAILY
Qty: 90 TABLET | Refills: 1 | Status: SHIPPED | OUTPATIENT
Start: 2020-02-13 | End: 2020-08-02

## 2020-02-13 NOTE — LETTER
Rita Dobson is a 75-year-old female living in [place] who does not a car or ability to get to doctor's appointments etc   Could you reach out to her and go over resources such as the [employer] or other programs  that she may qualify for?   Thanks    [facility]

## 2020-02-13 NOTE — PROGRESS NOTES
Assessment/Plan: Moderate major depression (HCC)  PHQ-9 = 15  I reviewed with pt  Unclear if her fatigue is playing a bigger role in her mood than she recognizes  Pt denies suicidal thought/plan  Will wean down of gabapentin  If not improving in 2-3 weeks, will adjust meds  Recheck by phone in 2-3 weeks  Check TSH in the interim    Lower back pain  Chronic  Unclear if gabapentin is helping - we will wean off and watch  Pt to call if pain worsens  Recheck 6m - earlier if worse    Fatigue  ?related to meds? Wean off gabapentin  Pt to call in 2-3 weeks with f/u    Elevated TSH  Recheck TSH       Diagnoses and all orders for this visit:    Moderate major depression ([address] )  -     TSH, 3rd generation with Free T4 reflex; Future    Cough  -     loratadine (CLARITIN) 10 mg tablet; Take 1 tablet (10 mg total) by mouth daily  -     benzonatate (TESSALON) 200 MG capsule; Take 1 capsule (200 mg total) by mouth 3 (three) times a day as needed for cough    Elevated TSH    Fatigue, unspecified type    Chronic bilateral low back pain without sciatica    Screening for colon cancer  -     Ambulatory referral for colonoscopy; Future          Subjective:      Patient ID: Laura Plascencia is a 75 y o  female  f/u multiple med issues  - still struggling with mood  Pt notes that she always feels tired and that she attributes this to her depression  No suicidal thought or plan  Pt compliant with venlafaxine  - still with neck and back pain  Meloxicam helps a little but she is not sure that gabapentin is helping    - pt is smoking intermittently  Pt not smoking at present  Breathing is stable  Still has chronic cough  Not productive  No worsening SOB  (+) reflux symptoms  - does not exercise regularly due to neck and back issues  - Denies CP, palpitations, lightheadedness or other CV symptoms with or without exertion  - no GI or  issues   Has not gone for mammo yet      The following portions of the patient's history were reviewed and updated as appropriate:   She  has a past medical history of Anxiety, Depression, GERD (gastroesophageal reflux disease), Panic attacks, and Psychiatric disorder  She   Patient Active Problem List    Diagnosis Date Noted    Moderate major depression ([facility] [address] ) 10/31/2017    Allergic rhinitis 06/29/2017    Gamekeeper's thumb of right hand 04/11/2017    Headache 02/28/2017    Imbalance 02/28/2017    Elevated TSH 12/16/2016    Acute calculous cholecystitis 05/11/2016    Osteoarthritis 05/26/2013    Fatigue 12/13/2012    Gastroesophageal reflux disease 11/13/2012    Lower back pain 10/14/2012    Anxiety 09/18/2012     She  has a past surgical history that includes Bladder suspension (1980); Tubal ligation (1976); pr lap,cholecystectomy (N/A, 7/11/2016); Gallbladder surgery; Urethra surgery; Colonoscopy; and Cholecystectomy  She  reports that she quit smoking about 10 months ago  Her smoking use included cigarettes  She smoked 0 00 packs per day for 15 00 years  She has never used smokeless tobacco  She reports that she does not drink alcohol or use drugs  Current Outpatient Medications   Medication Sig Dispense Refill    atorvastatin (LIPITOR) 20 mg tablet Take 1 tablet (20 mg total) by mouth daily 90 tablet 3    benzonatate (TESSALON) 200 MG capsule Take 1 capsule (200 mg total) by mouth 3 (three) times a day as needed for cough 90 capsule 1    Calcium 600 MG tablet Take by mouth      Cholecalciferol (VITAMIN D3) 1000 units CAPS Take by mouth      clonazePAM (KlonoPIN) 0 5 mg tablet Take 1 tablet (0 5 mg total) by mouth daily at bedtime 30 tablet 0    cyanocobalamin 1000 MCG tablet Take by mouth      fluticasone (FLONASE) 50 mcg/act nasal spray 2 sprays into each nostril daily 1 Bottle 5    gabapentin (NEURONTIN) 300 mg capsule Take 1 capsule by mouth three times daily   270 capsule 0    loratadine (CLARITIN) 10 mg tablet Take 1 tablet (10 mg total) by mouth daily 90 tablet 1    Magnesium Oxide -Mg Supplement 400 MG CAPS Take 1 capsule by mouth daily      Melatonin 1 MG CAPS Take by mouth      meloxicam (MOBIC) 7 5 mg tablet Take 1 tablet by mouth daily  30 tablet 0    Multiple Vitamin (MULTI-VITAMIN DAILY) TABS Take by mouth      omeprazole (PriLOSEC) 40 MG capsule Take 1 capsule (40 mg total) by mouth daily 30 capsule 5    venlafaxine (EFFEXOR-XR) 150 mg 24 hr capsule 1 cap q am 90 capsule 3    venlafaxine (EFFEXOR-XR) 75 mg 24 hr capsule Take 1 capsule (75 mg total) by mouth daily 1 capsule q evening 90 capsule 3     No current facility-administered medications for this visit  She is allergic to vancomycin and penicillins       Review of Systems   Constitutional: Positive for fatigue  Negative for chills, fever and unexpected weight change  HENT: Negative  Eyes: Negative  Respiratory: Negative  Cardiovascular: Negative  Gastrointestinal: Negative  Endocrine: Negative  Genitourinary: Negative  Musculoskeletal: Positive for arthralgias, back pain, myalgias and neck pain  Skin: Negative  Allergic/Immunologic: Negative  Neurological: Negative  Hematological: Negative  Psychiatric/Behavioral: Positive for decreased concentration and dysphoric mood  Negative for hallucinations, self-injury and suicidal ideas  The patient is not nervous/anxious and is not hyperactive  Objective:      /80   Pulse 64   Temp 98 3 °F (36 8 °C)   Ht 5' 3" (1 6 m)   Wt 74 3 kg (163 lb 12 8 oz)   BMI 29 02 kg/m²          Physical Exam   Constitutional: She is oriented to person, place, and time  She appears well-developed and well-nourished  HENT:   Head: Normocephalic and atraumatic  Right Ear: External ear normal    Left Ear: External ear normal    Nose: Nose normal    Mouth/Throat: Oropharynx is clear and moist  No oropharyngeal exudate  Eyes: Pupils are equal, round, and reactive to light  Conjunctivae and EOM are normal    Neck: Neck supple   No thyromegaly present  Cardiovascular: Normal rate, regular rhythm, normal heart sounds and intact distal pulses  No murmur heard  Pulmonary/Chest: Effort normal and breath sounds normal    Abdominal: Soft  She exhibits no distension and no mass  There is no tenderness  Musculoskeletal: Normal range of motion  She exhibits tenderness (along paracervical and paralumbar muscles)  She exhibits no edema  Lymphadenopathy:     She has no cervical adenopathy  Neurological: She is alert and oriented to person, place, and time  She has normal reflexes  She displays normal reflexes  No cranial nerve deficit  She exhibits normal muscle tone  Coordination normal    Skin: Skin is warm and dry  She is not diaphoretic     Psychiatric: Her behavior is normal  Judgment and thought content normal    PHQ-9 = 15 (moderate depression)

## 2020-02-14 ENCOUNTER — PATIENT OUTREACH (OUTPATIENT)
Dept: FAMILY MEDICINE CLINIC | Facility: CLINIC | Age: 65
End: 2020-02-14

## 2020-02-14 NOTE — ASSESSMENT & PLAN NOTE
Chronic  Unclear if gabapentin is helping - we will wean off and watch  Pt to call if pain worsens   Recheck 6m - earlier if worse

## 2020-02-14 NOTE — ASSESSMENT & PLAN NOTE
PHQ-9 = 15  I reviewed with pt  Unclear if her fatigue is playing a bigger role in her mood than she recognizes  Pt denies suicidal thought/plan  Will wean down of gabapentin  If not improving in 2-3 weeks, will adjust meds  Recheck by phone in 2-3 weeks   Check TSH in the interim

## 2020-02-14 NOTE — PROGRESS NOTES
Patient returned my call she will check with her health insurance for transportation   She will call me back if they do not have anything for her

## 2020-02-21 ENCOUNTER — TELEPHONE (OUTPATIENT)
Dept: FAMILY MEDICINE CLINIC | Facility: CLINIC | Age: 65
End: 2020-02-21

## 2020-02-25 ENCOUNTER — TELEPHONE (OUTPATIENT)
Dept: FAMILY MEDICINE CLINIC | Facility: CLINIC | Age: 65
End: 2020-02-25

## 2020-02-25 NOTE — TELEPHONE ENCOUNTER
Patient left message on refill line  You sent Tessalon perles to [address]  These are not covered by insurance  She wants to know if there is an alternative medication

## 2020-02-26 DIAGNOSIS — F41.9 ANXIETY: ICD-10-CM

## 2020-02-26 NOTE — TELEPHONE ENCOUNTER
Patient req refill sent to [facility]     Clonazepam 0 5 mg   # 30  1 HS     Last OV 02/13/20, Next OV 08/28/20

## 2020-02-26 NOTE — TELEPHONE ENCOUNTER
Spoke with Pt and she will try the Loratadine first and see if that helps the cough and if not she will call back

## 2020-02-26 NOTE — TELEPHONE ENCOUNTER
Please let pt know that tessalon is not covered by her insurance  How is her cough?   We could try a different med but it may be liquid

## 2020-02-27 RX ORDER — CLONAZEPAM 0.5 MG/1
0.5 TABLET ORAL
Qty: 30 TABLET | Refills: 0 | Status: SHIPPED | OUTPATIENT
Start: 2020-02-27 | End: 2020-03-10

## 2020-02-29 ENCOUNTER — HOSPITAL ENCOUNTER (INPATIENT)
Facility: HOSPITAL | Age: 65
LOS: 5 days | Discharge: NURSING, REHAB OR LONG-TERM CARE | DRG: 481 | End: 2020-03-05
Attending: EMERGENCY MEDICINE | Admitting: ORTHOPAEDIC SURGERY
Payer: COMMERCIAL

## 2020-02-29 ENCOUNTER — APPOINTMENT (EMERGENCY)
Dept: RADIOLOGY | Facility: HOSPITAL | Age: 65
DRG: 481 | End: 2020-02-29
Payer: COMMERCIAL

## 2020-02-29 DIAGNOSIS — Z98.890 STATUS POST OPEN REDUCTION WITH INTERNAL FIXATION OF FRACTURE: Primary | ICD-10-CM

## 2020-02-29 DIAGNOSIS — Z87.81 STATUS POST OPEN REDUCTION WITH INTERNAL FIXATION OF FRACTURE: Primary | ICD-10-CM

## 2020-02-29 DIAGNOSIS — S72.042A CLOSED DISPLACED BASICERVICAL FRACTURE OF LEFT FEMUR, INITIAL ENCOUNTER (HCC): ICD-10-CM

## 2020-02-29 DIAGNOSIS — S72.143A INTERTROCHANTERIC FRACTURE OF FEMUR (HCC): ICD-10-CM

## 2020-02-29 PROBLEM — Z01.818 PREOPERATIVE EXAMINATION: Status: ACTIVE | Noted: 2020-02-29

## 2020-02-29 PROBLEM — E78.49 OTHER HYPERLIPIDEMIA: Status: ACTIVE | Noted: 2020-02-29

## 2020-02-29 LAB
ABO GROUP BLD: NORMAL
ANION GAP SERPL CALCULATED.3IONS-SCNC: 9 MMOL/L (ref 4–13)
APTT PPP: 28 SECONDS (ref 23–37)
BLD GP AB SCN SERPL QL: NEGATIVE
BUN SERPL-MCNC: 11 MG/DL (ref 5–25)
CALCIUM SERPL-MCNC: 8.8 MG/DL (ref 8.3–10.1)
CHLORIDE SERPL-SCNC: 109 MMOL/L (ref 100–108)
CO2 SERPL-SCNC: 24 MMOL/L (ref 21–32)
CREAT SERPL-MCNC: 0.92 MG/DL (ref 0.6–1.3)
ERYTHROCYTE [DISTWIDTH] IN BLOOD BY AUTOMATED COUNT: 13.8 % (ref 11.6–15.1)
GFR SERPL CREATININE-BSD FRML MDRD: 65 ML/MIN/1.73SQ M
GLUCOSE SERPL-MCNC: 96 MG/DL (ref 65–140)
HCT VFR BLD AUTO: 37.6 % (ref 34.8–46.1)
HGB BLD-MCNC: 11.9 G/DL (ref 11.5–15.4)
INR PPP: 1.03 (ref 0.84–1.19)
MCH RBC QN AUTO: 29 PG (ref 26.8–34.3)
MCHC RBC AUTO-ENTMCNC: 31.6 G/DL (ref 31.4–37.4)
MCV RBC AUTO: 92 FL (ref 82–98)
PLATELET # BLD AUTO: 354 THOUSANDS/UL (ref 149–390)
PMV BLD AUTO: 9.5 FL (ref 8.9–12.7)
POTASSIUM SERPL-SCNC: 4.4 MMOL/L (ref 3.5–5.3)
PROTHROMBIN TIME: 13.1 SECONDS (ref 11.6–14.5)
RBC # BLD AUTO: 4.11 MILLION/UL (ref 3.81–5.12)
RH BLD: POSITIVE
SODIUM SERPL-SCNC: 142 MMOL/L (ref 136–145)
SPECIMEN EXPIRATION DATE: NORMAL
WBC # BLD AUTO: 14.14 THOUSAND/UL (ref 4.31–10.16)

## 2020-02-29 PROCEDURE — 85610 PROTHROMBIN TIME: CPT | Performed by: ORTHOPAEDIC SURGERY

## 2020-02-29 PROCEDURE — 99285 EMERGENCY DEPT VISIT HI MDM: CPT | Performed by: EMERGENCY MEDICINE

## 2020-02-29 PROCEDURE — 73502 X-RAY EXAM HIP UNI 2-3 VIEWS: CPT

## 2020-02-29 PROCEDURE — 73552 X-RAY EXAM OF FEMUR 2/>: CPT

## 2020-02-29 PROCEDURE — 99285 EMERGENCY DEPT VISIT HI MDM: CPT

## 2020-02-29 PROCEDURE — 36415 COLL VENOUS BLD VENIPUNCTURE: CPT | Performed by: ORTHOPAEDIC SURGERY

## 2020-02-29 PROCEDURE — 96375 TX/PRO/DX INJ NEW DRUG ADDON: CPT

## 2020-02-29 PROCEDURE — 71045 X-RAY EXAM CHEST 1 VIEW: CPT

## 2020-02-29 PROCEDURE — 93005 ELECTROCARDIOGRAM TRACING: CPT

## 2020-02-29 PROCEDURE — 85027 COMPLETE CBC AUTOMATED: CPT | Performed by: ORTHOPAEDIC SURGERY

## 2020-02-29 PROCEDURE — 85730 THROMBOPLASTIN TIME PARTIAL: CPT | Performed by: ORTHOPAEDIC SURGERY

## 2020-02-29 PROCEDURE — 86900 BLOOD TYPING SEROLOGIC ABO: CPT | Performed by: ORTHOPAEDIC SURGERY

## 2020-02-29 PROCEDURE — 99254 IP/OBS CNSLTJ NEW/EST MOD 60: CPT | Performed by: INTERNAL MEDICINE

## 2020-02-29 PROCEDURE — 86901 BLOOD TYPING SEROLOGIC RH(D): CPT | Performed by: ORTHOPAEDIC SURGERY

## 2020-02-29 PROCEDURE — 86850 RBC ANTIBODY SCREEN: CPT | Performed by: ORTHOPAEDIC SURGERY

## 2020-02-29 PROCEDURE — 80048 BASIC METABOLIC PNL TOTAL CA: CPT | Performed by: ORTHOPAEDIC SURGERY

## 2020-02-29 PROCEDURE — 96374 THER/PROPH/DIAG INJ IV PUSH: CPT

## 2020-02-29 RX ORDER — CHLORHEXIDINE GLUCONATE 0.12 MG/ML
15 RINSE ORAL ONCE
Status: CANCELLED | OUTPATIENT
Start: 2020-02-29 | End: 2020-02-29

## 2020-02-29 RX ORDER — FLUTICASONE PROPIONATE 50 MCG
2 SPRAY, SUSPENSION (ML) NASAL DAILY
Status: DISCONTINUED | OUTPATIENT
Start: 2020-03-01 | End: 2020-03-05 | Stop reason: HOSPADM

## 2020-02-29 RX ORDER — ACETAMINOPHEN 325 MG/1
975 TABLET ORAL EVERY 8 HOURS SCHEDULED
Status: DISCONTINUED | OUTPATIENT
Start: 2020-02-29 | End: 2020-03-05 | Stop reason: HOSPADM

## 2020-02-29 RX ORDER — SODIUM CHLORIDE, SODIUM LACTATE, POTASSIUM CHLORIDE, CALCIUM CHLORIDE 600; 310; 30; 20 MG/100ML; MG/100ML; MG/100ML; MG/100ML
75 INJECTION, SOLUTION INTRAVENOUS CONTINUOUS
Status: DISCONTINUED | OUTPATIENT
Start: 2020-03-01 | End: 2020-03-03

## 2020-02-29 RX ORDER — LORATADINE 10 MG/1
10 TABLET ORAL DAILY
Status: DISCONTINUED | OUTPATIENT
Start: 2020-03-01 | End: 2020-03-05 | Stop reason: HOSPADM

## 2020-02-29 RX ORDER — LANOLIN ALCOHOL/MO/W.PET/CERES
3 CREAM (GRAM) TOPICAL
Status: DISCONTINUED | OUTPATIENT
Start: 2020-02-29 | End: 2020-03-05 | Stop reason: HOSPADM

## 2020-02-29 RX ORDER — GABAPENTIN 300 MG/1
300 CAPSULE ORAL 2 TIMES DAILY
Status: DISCONTINUED | OUTPATIENT
Start: 2020-02-29 | End: 2020-03-05 | Stop reason: HOSPADM

## 2020-02-29 RX ORDER — VENLAFAXINE HYDROCHLORIDE 150 MG/1
150 CAPSULE, EXTENDED RELEASE ORAL DAILY
Status: DISCONTINUED | OUTPATIENT
Start: 2020-03-01 | End: 2020-03-05 | Stop reason: HOSPADM

## 2020-02-29 RX ORDER — MORPHINE SULFATE 10 MG/ML
6 INJECTION, SOLUTION INTRAMUSCULAR; INTRAVENOUS ONCE
Status: COMPLETED | OUTPATIENT
Start: 2020-02-29 | End: 2020-02-29

## 2020-02-29 RX ORDER — HYDROMORPHONE HCL/PF 1 MG/ML
1 SYRINGE (ML) INJECTION ONCE
Status: COMPLETED | OUTPATIENT
Start: 2020-02-29 | End: 2020-02-29

## 2020-02-29 RX ORDER — METHOCARBAMOL 500 MG/1
500 TABLET, FILM COATED ORAL EVERY 6 HOURS SCHEDULED
Status: DISCONTINUED | OUTPATIENT
Start: 2020-03-01 | End: 2020-03-02

## 2020-02-29 RX ORDER — PANTOPRAZOLE SODIUM 40 MG/1
40 TABLET, DELAYED RELEASE ORAL
Status: DISCONTINUED | OUTPATIENT
Start: 2020-03-01 | End: 2020-03-05 | Stop reason: HOSPADM

## 2020-02-29 RX ORDER — VENLAFAXINE HYDROCHLORIDE 75 MG/1
75 CAPSULE, EXTENDED RELEASE ORAL EVERY EVENING
Status: DISCONTINUED | OUTPATIENT
Start: 2020-02-29 | End: 2020-03-05 | Stop reason: HOSPADM

## 2020-02-29 RX ORDER — ATORVASTATIN CALCIUM 20 MG/1
20 TABLET, FILM COATED ORAL DAILY
Status: DISCONTINUED | OUTPATIENT
Start: 2020-03-01 | End: 2020-03-05 | Stop reason: HOSPADM

## 2020-02-29 RX ORDER — CLONAZEPAM 0.5 MG/1
0.5 TABLET ORAL
Status: DISCONTINUED | OUTPATIENT
Start: 2020-02-29 | End: 2020-03-05 | Stop reason: HOSPADM

## 2020-02-29 RX ORDER — OXYCODONE HYDROCHLORIDE 10 MG/1
10 TABLET ORAL EVERY 4 HOURS PRN
Status: DISCONTINUED | OUTPATIENT
Start: 2020-02-29 | End: 2020-03-03

## 2020-02-29 RX ORDER — HYDROMORPHONE HCL/PF 1 MG/ML
0.5 SYRINGE (ML) INJECTION EVERY 4 HOURS PRN
Status: DISCONTINUED | OUTPATIENT
Start: 2020-02-29 | End: 2020-03-05 | Stop reason: HOSPADM

## 2020-02-29 RX ORDER — MELATONIN
1000 DAILY
Status: DISCONTINUED | OUTPATIENT
Start: 2020-03-01 | End: 2020-03-05 | Stop reason: HOSPADM

## 2020-02-29 RX ORDER — OXYCODONE HYDROCHLORIDE 5 MG/1
5 TABLET ORAL EVERY 4 HOURS PRN
Status: DISCONTINUED | OUTPATIENT
Start: 2020-02-29 | End: 2020-03-03

## 2020-02-29 RX ORDER — HYDROMORPHONE HCL/PF 1 MG/ML
1 SYRINGE (ML) INJECTION ONCE
Status: DISCONTINUED | OUTPATIENT
Start: 2020-02-29 | End: 2020-03-05 | Stop reason: HOSPADM

## 2020-02-29 RX ADMIN — ACETAMINOPHEN 975 MG: 325 TABLET ORAL at 22:19

## 2020-02-29 RX ADMIN — MORPHINE SULFATE 6 MG: 10 INJECTION INTRAVENOUS at 18:44

## 2020-02-29 RX ADMIN — GABAPENTIN 300 MG: 300 CAPSULE ORAL at 22:21

## 2020-02-29 RX ADMIN — OXYCODONE HYDROCHLORIDE 10 MG: 10 TABLET ORAL at 22:19

## 2020-02-29 RX ADMIN — CLONAZEPAM 0.5 MG: 0.5 TABLET ORAL at 22:19

## 2020-02-29 RX ADMIN — MELATONIN 3 MG: 3 TAB ORAL at 22:19

## 2020-02-29 RX ADMIN — HYDROMORPHONE HYDROCHLORIDE 1 MG: 1 INJECTION, SOLUTION INTRAMUSCULAR; INTRAVENOUS; SUBCUTANEOUS at 19:18

## 2020-02-29 NOTE — ED ATTENDING ATTESTATION
2/29/2020  I, Cata Morin MD, saw and evaluated the patient  I have discussed the patient with the resident/non-physician practitioner and agree with the resident's/non-physician practitioner's findings, Plan of Care, and MDM as documented in the resident's/non-physician practitioner's note, except where noted  All available labs and Radiology studies were reviewed  I was present for key portions of any procedure(s) performed by the resident/non-physician practitioner and I was immediately available to provide assistance  At this point I agree with the current assessment done in the Emergency Department  I have conducted an independent evaluation of this patient a history and physical is as follows:    ED Course         Critical Care Time  Procedures     76 yo female while walking dog, tripped and fell on left hip and thigh  Pt did not hit head, no loc  Pt helped up by neighbor, unable to bear weight  No previous injury  No blood thinners  pmh gerd, depression, anxiety  Vss, afebrile, lungs cta, rrr, abodmen soft nontender, no spinal tenderness, left hip tenderness, left knee abrasion  Pelvis stable, pain meds, xrays

## 2020-03-01 ENCOUNTER — APPOINTMENT (INPATIENT)
Dept: RADIOLOGY | Facility: HOSPITAL | Age: 65
DRG: 481 | End: 2020-03-01
Payer: COMMERCIAL

## 2020-03-01 ENCOUNTER — ANESTHESIA (INPATIENT)
Dept: PERIOP | Facility: HOSPITAL | Age: 65
DRG: 481 | End: 2020-03-01
Payer: COMMERCIAL

## 2020-03-01 ENCOUNTER — ANESTHESIA EVENT (INPATIENT)
Dept: PERIOP | Facility: HOSPITAL | Age: 65
DRG: 481 | End: 2020-03-01
Payer: COMMERCIAL

## 2020-03-01 LAB
ABO GROUP BLD: NORMAL
ANION GAP SERPL CALCULATED.3IONS-SCNC: 7 MMOL/L (ref 4–13)
ATRIAL RATE: 89 BPM
BASOPHILS # BLD AUTO: 0.03 THOUSANDS/ΜL (ref 0–0.1)
BASOPHILS NFR BLD AUTO: 0 % (ref 0–1)
BUN SERPL-MCNC: 14 MG/DL (ref 5–25)
CALCIUM SERPL-MCNC: 8.9 MG/DL (ref 8.3–10.1)
CHLORIDE SERPL-SCNC: 109 MMOL/L (ref 100–108)
CO2 SERPL-SCNC: 25 MMOL/L (ref 21–32)
CREAT SERPL-MCNC: 1.19 MG/DL (ref 0.6–1.3)
EOSINOPHIL # BLD AUTO: 0.08 THOUSAND/ΜL (ref 0–0.61)
EOSINOPHIL NFR BLD AUTO: 1 % (ref 0–6)
ERYTHROCYTE [DISTWIDTH] IN BLOOD BY AUTOMATED COUNT: 14 % (ref 11.6–15.1)
GFR SERPL CREATININE-BSD FRML MDRD: 48 ML/MIN/1.73SQ M
GLUCOSE SERPL-MCNC: 92 MG/DL (ref 65–140)
HCT VFR BLD AUTO: 36.1 % (ref 34.8–46.1)
HGB BLD-MCNC: 11.2 G/DL (ref 11.5–15.4)
IMM GRANULOCYTES # BLD AUTO: 0.01 THOUSAND/UL (ref 0–0.2)
IMM GRANULOCYTES NFR BLD AUTO: 0 % (ref 0–2)
LYMPHOCYTES # BLD AUTO: 2.51 THOUSANDS/ΜL (ref 0.6–4.47)
LYMPHOCYTES NFR BLD AUTO: 36 % (ref 14–44)
MCH RBC QN AUTO: 28.7 PG (ref 26.8–34.3)
MCHC RBC AUTO-ENTMCNC: 31 G/DL (ref 31.4–37.4)
MCV RBC AUTO: 93 FL (ref 82–98)
MONOCYTES # BLD AUTO: 0.64 THOUSAND/ΜL (ref 0.17–1.22)
MONOCYTES NFR BLD AUTO: 9 % (ref 4–12)
NEUTROPHILS # BLD AUTO: 3.77 THOUSANDS/ΜL (ref 1.85–7.62)
NEUTS SEG NFR BLD AUTO: 54 % (ref 43–75)
NRBC BLD AUTO-RTO: 0 /100 WBCS
P AXIS: 57 DEGREES
PLATELET # BLD AUTO: 350 THOUSANDS/UL (ref 149–390)
PMV BLD AUTO: 9.7 FL (ref 8.9–12.7)
POTASSIUM SERPL-SCNC: 4.3 MMOL/L (ref 3.5–5.3)
PR INTERVAL: 178 MS
QRS AXIS: 24 DEGREES
QRSD INTERVAL: 72 MS
QT INTERVAL: 330 MS
QTC INTERVAL: 401 MS
RBC # BLD AUTO: 3.9 MILLION/UL (ref 3.81–5.12)
RH BLD: POSITIVE
SODIUM SERPL-SCNC: 141 MMOL/L (ref 136–145)
T WAVE AXIS: 36 DEGREES
VENTRICULAR RATE: 89 BPM
WBC # BLD AUTO: 7.04 THOUSAND/UL (ref 4.31–10.16)

## 2020-03-01 PROCEDURE — C1713 ANCHOR/SCREW BN/BN,TIS/BN: HCPCS | Performed by: ORTHOPAEDIC SURGERY

## 2020-03-01 PROCEDURE — 80048 BASIC METABOLIC PNL TOTAL CA: CPT | Performed by: ORTHOPAEDIC SURGERY

## 2020-03-01 PROCEDURE — 85025 COMPLETE CBC W/AUTO DIFF WBC: CPT | Performed by: ORTHOPAEDIC SURGERY

## 2020-03-01 PROCEDURE — 27245 TREAT THIGH FRACTURE: CPT | Performed by: ORTHOPAEDIC SURGERY

## 2020-03-01 PROCEDURE — NC001 PR NO CHARGE: Performed by: ORTHOPAEDIC SURGERY

## 2020-03-01 PROCEDURE — 73502 X-RAY EXAM HIP UNI 2-3 VIEWS: CPT

## 2020-03-01 PROCEDURE — 93010 ELECTROCARDIOGRAM REPORT: CPT | Performed by: INTERNAL MEDICINE

## 2020-03-01 PROCEDURE — 0QS736Z REPOSITION LEFT UPPER FEMUR WITH INTRAMEDULLARY INTERNAL FIXATION DEVICE, PERCUTANEOUS APPROACH: ICD-10-PCS | Performed by: ORTHOPAEDIC SURGERY

## 2020-03-01 PROCEDURE — 99223 1ST HOSP IP/OBS HIGH 75: CPT | Performed by: ORTHOPAEDIC SURGERY

## 2020-03-01 PROCEDURE — C1769 GUIDE WIRE: HCPCS | Performed by: ORTHOPAEDIC SURGERY

## 2020-03-01 PROCEDURE — 99232 SBSQ HOSP IP/OBS MODERATE 35: CPT | Performed by: PHYSICIAN ASSISTANT

## 2020-03-01 DEVICE — 11.0MM TI TROCH FIXATION NAIL SCREW/95MM - STERILE: Type: IMPLANTABLE DEVICE | Site: LEG | Status: FUNCTIONAL

## 2020-03-01 DEVICE — 10MM/130 DEG TI CANN TROCH FIXATION NAIL 170MM-STERILE: Type: IMPLANTABLE DEVICE | Site: LEG | Status: FUNCTIONAL

## 2020-03-01 RX ORDER — ROCURONIUM BROMIDE 10 MG/ML
INJECTION, SOLUTION INTRAVENOUS AS NEEDED
Status: DISCONTINUED | OUTPATIENT
Start: 2020-03-01 | End: 2020-03-01 | Stop reason: SURG

## 2020-03-01 RX ORDER — ALBUTEROL SULFATE 2.5 MG/3ML
2.5 SOLUTION RESPIRATORY (INHALATION) ONCE AS NEEDED
Status: COMPLETED | OUTPATIENT
Start: 2020-03-01 | End: 2020-03-01

## 2020-03-01 RX ORDER — VASOPRESSIN 20 U/ML
INJECTION PARENTERAL AS NEEDED
Status: DISCONTINUED | OUTPATIENT
Start: 2020-03-01 | End: 2020-03-01 | Stop reason: SURG

## 2020-03-01 RX ORDER — ONDANSETRON 2 MG/ML
4 INJECTION INTRAMUSCULAR; INTRAVENOUS ONCE AS NEEDED
Status: DISCONTINUED | OUTPATIENT
Start: 2020-03-01 | End: 2020-03-01 | Stop reason: HOSPADM

## 2020-03-01 RX ORDER — LIDOCAINE HYDROCHLORIDE 10 MG/ML
INJECTION, SOLUTION EPIDURAL; INFILTRATION; INTRACAUDAL; PERINEURAL AS NEEDED
Status: DISCONTINUED | OUTPATIENT
Start: 2020-03-01 | End: 2020-03-01 | Stop reason: SURG

## 2020-03-01 RX ORDER — GLYCOPYRROLATE 0.2 MG/ML
INJECTION INTRAMUSCULAR; INTRAVENOUS AS NEEDED
Status: DISCONTINUED | OUTPATIENT
Start: 2020-03-01 | End: 2020-03-01 | Stop reason: SURG

## 2020-03-01 RX ORDER — ALBUMIN, HUMAN INJ 5% 5 %
SOLUTION INTRAVENOUS CONTINUOUS PRN
Status: DISCONTINUED | OUTPATIENT
Start: 2020-03-01 | End: 2020-03-01 | Stop reason: SURG

## 2020-03-01 RX ORDER — SUCCINYLCHOLINE/SOD CL,ISO/PF 100 MG/5ML
SYRINGE (ML) INTRAVENOUS AS NEEDED
Status: DISCONTINUED | OUTPATIENT
Start: 2020-03-01 | End: 2020-03-01 | Stop reason: SURG

## 2020-03-01 RX ORDER — CEFAZOLIN SODIUM 1 G/3ML
INJECTION, POWDER, FOR SOLUTION INTRAMUSCULAR; INTRAVENOUS AS NEEDED
Status: DISCONTINUED | OUTPATIENT
Start: 2020-03-01 | End: 2020-03-01 | Stop reason: SURG

## 2020-03-01 RX ORDER — SODIUM CHLORIDE 9 MG/ML
INJECTION, SOLUTION INTRAVENOUS CONTINUOUS PRN
Status: DISCONTINUED | OUTPATIENT
Start: 2020-03-01 | End: 2020-03-01 | Stop reason: SURG

## 2020-03-01 RX ORDER — PROPOFOL 10 MG/ML
INJECTION, EMULSION INTRAVENOUS AS NEEDED
Status: DISCONTINUED | OUTPATIENT
Start: 2020-03-01 | End: 2020-03-01 | Stop reason: SURG

## 2020-03-01 RX ORDER — FENTANYL CITRATE 50 UG/ML
INJECTION, SOLUTION INTRAMUSCULAR; INTRAVENOUS AS NEEDED
Status: DISCONTINUED | OUTPATIENT
Start: 2020-03-01 | End: 2020-03-01 | Stop reason: SURG

## 2020-03-01 RX ORDER — NEOSTIGMINE METHYLSULFATE 1 MG/ML
INJECTION INTRAVENOUS AS NEEDED
Status: DISCONTINUED | OUTPATIENT
Start: 2020-03-01 | End: 2020-03-01 | Stop reason: SURG

## 2020-03-01 RX ORDER — EPHEDRINE SULFATE 50 MG/ML
INJECTION INTRAVENOUS AS NEEDED
Status: DISCONTINUED | OUTPATIENT
Start: 2020-03-01 | End: 2020-03-01 | Stop reason: SURG

## 2020-03-01 RX ORDER — FENTANYL CITRATE/PF 50 MCG/ML
25 SYRINGE (ML) INJECTION
Status: DISCONTINUED | OUTPATIENT
Start: 2020-03-01 | End: 2020-03-01 | Stop reason: HOSPADM

## 2020-03-01 RX ORDER — ALBUTEROL SULFATE 90 UG/1
AEROSOL, METERED RESPIRATORY (INHALATION) AS NEEDED
Status: DISCONTINUED | OUTPATIENT
Start: 2020-03-01 | End: 2020-03-01 | Stop reason: SURG

## 2020-03-01 RX ADMIN — CEFAZOLIN 2000 MG: 1 INJECTION, POWDER, FOR SOLUTION INTRAMUSCULAR; INTRAVENOUS at 09:02

## 2020-03-01 RX ADMIN — Medication 100 MG: at 08:58

## 2020-03-01 RX ADMIN — PHENYLEPHRINE HYDROCHLORIDE 100 MCG/MIN: 10 INJECTION INTRAVENOUS at 09:00

## 2020-03-01 RX ADMIN — ACETAMINOPHEN 975 MG: 325 TABLET ORAL at 05:05

## 2020-03-01 RX ADMIN — PHENYLEPHRINE HYDROCHLORIDE 100 MCG: 10 INJECTION INTRAVENOUS at 09:40

## 2020-03-01 RX ADMIN — METHOCARBAMOL TABLETS 500 MG: 500 TABLET, COATED ORAL at 22:45

## 2020-03-01 RX ADMIN — METHOCARBAMOL TABLETS 500 MG: 500 TABLET, COATED ORAL at 05:05

## 2020-03-01 RX ADMIN — MELATONIN 3 MG: 3 TAB ORAL at 22:44

## 2020-03-01 RX ADMIN — ROCURONIUM BROMIDE 10 MG: 50 INJECTION, SOLUTION INTRAVENOUS at 09:15

## 2020-03-01 RX ADMIN — LIDOCAINE HYDROCHLORIDE 1 MG: 10 INJECTION, SOLUTION EPIDURAL; INFILTRATION; INTRACAUDAL; PERINEURAL at 09:41

## 2020-03-01 RX ADMIN — CLONAZEPAM 0.5 MG: 0.5 TABLET ORAL at 22:44

## 2020-03-01 RX ADMIN — VENLAFAXINE HYDROCHLORIDE 75 MG: 75 CAPSULE, EXTENDED RELEASE ORAL at 00:21

## 2020-03-01 RX ADMIN — NEOSTIGMINE METHYLSULFATE 3 MG: 1 INJECTION INTRAVENOUS at 09:44

## 2020-03-01 RX ADMIN — VASOPRESSIN 1 UNITS: 20 INJECTION INTRAVENOUS at 09:04

## 2020-03-01 RX ADMIN — SODIUM CHLORIDE, SODIUM LACTATE, POTASSIUM CHLORIDE, AND CALCIUM CHLORIDE 75 ML/HR: .6; .31; .03; .02 INJECTION, SOLUTION INTRAVENOUS at 00:21

## 2020-03-01 RX ADMIN — OXYCODONE HYDROCHLORIDE 10 MG: 10 TABLET ORAL at 20:04

## 2020-03-01 RX ADMIN — GLYCOPYRROLATE 0.4 MG: 0.2 INJECTION, SOLUTION INTRAMUSCULAR; INTRAVENOUS at 09:44

## 2020-03-01 RX ADMIN — VASOPRESSIN 1 UNITS: 20 INJECTION INTRAVENOUS at 09:03

## 2020-03-01 RX ADMIN — OXYCODONE HYDROCHLORIDE 10 MG: 10 TABLET ORAL at 11:19

## 2020-03-01 RX ADMIN — ALBUMIN (HUMAN): 12.5 SOLUTION INTRAVENOUS at 09:00

## 2020-03-01 RX ADMIN — ENOXAPARIN SODIUM 40 MG: 40 INJECTION SUBCUTANEOUS at 20:04

## 2020-03-01 RX ADMIN — EPHEDRINE SULFATE 10 MG: 50 INJECTION, SOLUTION INTRAVENOUS at 09:13

## 2020-03-01 RX ADMIN — ACETAMINOPHEN 975 MG: 325 TABLET ORAL at 13:18

## 2020-03-01 RX ADMIN — ALBUTEROL SULFATE 5 PUFF: 90 AEROSOL, METERED RESPIRATORY (INHALATION) at 09:25

## 2020-03-01 RX ADMIN — PHENYLEPHRINE HYDROCHLORIDE 200 MCG: 10 INJECTION INTRAVENOUS at 09:04

## 2020-03-01 RX ADMIN — HYDROMORPHONE HYDROCHLORIDE 0.5 MG: 1 INJECTION, SOLUTION INTRAMUSCULAR; INTRAVENOUS; SUBCUTANEOUS at 00:20

## 2020-03-01 RX ADMIN — PROPOFOL 150 MG: 10 INJECTION, EMULSION INTRAVENOUS at 08:58

## 2020-03-01 RX ADMIN — EPHEDRINE SULFATE 10 MG: 50 INJECTION, SOLUTION INTRAVENOUS at 09:11

## 2020-03-01 RX ADMIN — PHENYLEPHRINE HYDROCHLORIDE 200 MCG: 10 INJECTION INTRAVENOUS at 09:03

## 2020-03-01 RX ADMIN — ALBUTEROL SULFATE 2.5 MG: 2.5 SOLUTION RESPIRATORY (INHALATION) at 10:06

## 2020-03-01 RX ADMIN — CEFAZOLIN SODIUM 2000 MG: 10 INJECTION, POWDER, FOR SOLUTION INTRAVENOUS at 17:12

## 2020-03-01 RX ADMIN — OXYCODONE HYDROCHLORIDE 10 MG: 10 TABLET ORAL at 05:05

## 2020-03-01 RX ADMIN — VENLAFAXINE HYDROCHLORIDE 75 MG: 75 CAPSULE, EXTENDED RELEASE ORAL at 17:12

## 2020-03-01 RX ADMIN — GABAPENTIN 300 MG: 300 CAPSULE ORAL at 17:12

## 2020-03-01 RX ADMIN — ACETAMINOPHEN 975 MG: 325 TABLET ORAL at 22:44

## 2020-03-01 RX ADMIN — METHOCARBAMOL TABLETS 500 MG: 500 TABLET, COATED ORAL at 00:21

## 2020-03-01 RX ADMIN — PANTOPRAZOLE SODIUM 40 MG: 40 TABLET, DELAYED RELEASE ORAL at 05:05

## 2020-03-01 RX ADMIN — SODIUM CHLORIDE, SODIUM LACTATE, POTASSIUM CHLORIDE, AND CALCIUM CHLORIDE 75 ML/HR: .6; .31; .03; .02 INJECTION, SOLUTION INTRAVENOUS at 10:59

## 2020-03-01 RX ADMIN — METHOCARBAMOL TABLETS 500 MG: 500 TABLET, COATED ORAL at 17:12

## 2020-03-01 RX ADMIN — METHOCARBAMOL TABLETS 500 MG: 500 TABLET, COATED ORAL at 11:19

## 2020-03-01 RX ADMIN — OXYCODONE HYDROCHLORIDE 10 MG: 10 TABLET ORAL at 15:34

## 2020-03-01 RX ADMIN — SODIUM CHLORIDE: 9 INJECTION, SOLUTION INTRAVENOUS at 09:20

## 2020-03-01 RX ADMIN — FENTANYL CITRATE 100 MCG: 50 INJECTION, SOLUTION INTRAMUSCULAR; INTRAVENOUS at 08:58

## 2020-03-01 RX ADMIN — PHENYLEPHRINE HYDROCHLORIDE 100 MCG: 10 INJECTION INTRAVENOUS at 09:05

## 2020-03-01 RX ADMIN — PHENYLEPHRINE HYDROCHLORIDE 100 MCG: 10 INJECTION INTRAVENOUS at 09:08

## 2020-03-01 NOTE — PERIOPERATIVE NURSING NOTE
Dr Gilbert Lancaster paged to evaluate patient in PACU  Patient desaturates to 87% on 4 liters NC  Patient appears to be in no respiratory distress  Patient encouraged to breathe and is able to return 02 saturation to low 90s   Per Dr Gilbert Lancaster patient is able to return to floor

## 2020-03-01 NOTE — SOCIAL WORK
CM met with Pt with an introduction and explanation of role  Pt reported residing alone in a first floor apartment with no use of DME but has access to a cane and 4 steps to enter the home  Pt reported being independent with ADLs with no hx of VNA, SNF, mental health or drug/alcohol placements  Pt denied having a living will, reported the use of [facility] for her medications and has Jacki Masters as a PCP  CM reviewed d/c planning process including the following: identifying help at home, patient preference for d/c planning needs, Discharge Lounge, [facility] Meds to Bed program, availability of treatment team to discuss questions or concerns patient and/or family may have regarding understanding medications and recognizing signs and symptoms once discharged  CM also encouraged patient to follow up with all recommended appointments after discharge  Patient advised of importance for patient and family to participate in managing patients medical well being

## 2020-03-01 NOTE — PLAN OF CARE
Problem: Potential for Falls  Goal: Patient will remain free of falls  Description  INTERVENTIONS:  - Assess patient frequently for physical needs  -  Identify cognitive and physical deficits and behaviors that affect risk of falls    -  [place] fall precautions as indicated by assessment   - Educate patient/family on patient safety including physical limitations  - Instruct patient to call for assistance with activity based on assessment  - Modify environment to reduce risk of injury  - Consider OT/PT consult to assist with strengthening/mobility  Outcome: Progressing     Problem: PAIN - ADULT  Goal: Verbalizes/displays adequate comfort level or baseline comfort level  Description  Interventions:  - Encourage patient to monitor pain and request assistance  - Assess pain using appropriate pain scale  - Administer analgesics based on type and severity of pain and evaluate response  - Implement non-pharmacological measures as appropriate and evaluate response  - Consider cultural and social influences on pain and pain management  - Notify physician/advanced practitioner if interventions unsuccessful or patient reports new pain  Outcome: Progressing     Problem: INFECTION - ADULT  Goal: Absence or prevention of progression during hospitalization  Description  INTERVENTIONS:  - Assess and monitor for signs and symptoms of infection  - Monitor lab/diagnostic results  - Monitor all insertion sites, i e  indwelling lines, tubes, and drains  - Monitor endotracheal if appropriate and nasal secretions for changes in amount and color  - [place] appropriate cooling/warming therapies per order  - Administer medications as ordered  - Instruct and encourage patient and family to use good hand hygiene technique  - Identify and instruct in appropriate isolation precautions for identified infection/condition  Outcome: Progressing     Problem: SAFETY ADULT  Goal: Patient will remain free of falls  Description  INTERVENTIONS:  - Assess patient frequently for physical needs  -  Identify cognitive and physical deficits and behaviors that affect risk of falls    -  [place] fall precautions as indicated by assessment   - Educate patient/family on patient safety including physical limitations  - Instruct patient to call for assistance with activity based on assessment  - Modify environment to reduce risk of injury  - Consider OT/PT consult to assist with strengthening/mobility  Outcome: Progressing  Goal: Maintain or return to baseline ADL function  Description  INTERVENTIONS:  -  Assess patient's ability to carry out ADLs; assess patient's baseline for ADL function and identify physical deficits which impact ability to perform ADLs (bathing, care of mouth/teeth, toileting, grooming, dressing, etc )  - Assess/evaluate cause of self-care deficits   - Assess range of motion  - Assess patient's mobility; develop plan if impaired  - Assess patient's need for assistive devices and provide as appropriate  - Encourage maximum independence but intervene and supervise when necessary  - Involve family in performance of ADLs  - Assess for home care needs following discharge   - Consider OT consult to assist with ADL evaluation and planning for discharge  - Provide patient education as appropriate  Outcome: Progressing  Goal: Maintain or return mobility status to optimal level  Description  INTERVENTIONS:  - Assess patient's baseline mobility status (ambulation, transfers, stairs, etc )    - Identify cognitive and physical deficits and behaviors that affect mobility  - Identify mobility aids required to assist with transfers and/or ambulation (gait belt, sit-to-stand, lift, walker, cane, etc )  - [place] fall precautions as indicated by assessment  - Record patient progress and toleration of activity level on Mobility SBAR; progress patient to next Phase/Stage  - Instruct patient to call for assistance with activity based on assessment  - Consider rehabilitation consult to assist with strengthening/weightbearing, etc   Outcome: Progressing     Problem: SAFETY ADULT  Goal: Maintain or return to baseline ADL function  Description  INTERVENTIONS:  -  Assess patient's ability to carry out ADLs; assess patient's baseline for ADL function and identify physical deficits which impact ability to perform ADLs (bathing, care of mouth/teeth, toileting, grooming, dressing, etc )  - Assess/evaluate cause of self-care deficits   - Assess range of motion  - Assess patient's mobility; develop plan if impaired  - Assess patient's need for assistive devices and provide as appropriate  - Encourage maximum independence but intervene and supervise when necessary  - Involve family in performance of ADLs  - Assess for home care needs following discharge   - Consider OT consult to assist with ADL evaluation and planning for discharge  - Provide patient education as appropriate  Outcome: Progressing     Problem: SAFETY ADULT  Goal: Maintain or return mobility status to optimal level  Description  INTERVENTIONS:  - Assess patient's baseline mobility status (ambulation, transfers, stairs, etc )    - Identify cognitive and physical deficits and behaviors that affect mobility  - Identify mobility aids required to assist with transfers and/or ambulation (gait belt, sit-to-stand, lift, walker, cane, etc )  - [place] fall precautions as indicated by assessment  - Record patient progress and toleration of activity level on Mobility SBAR; progress patient to next Phase/Stage  - Instruct patient to call for assistance with activity based on assessment  - Consider rehabilitation consult to assist with strengthening/weightbearing, etc   Outcome: Progressing     Problem: MUSCULOSKELETAL - ADULT  Goal: Maintain or return mobility to safest level of function  Description  INTERVENTIONS:  - Assess patient's ability to carry out ADLs; assess patient's baseline for ADL function and identify physical deficits which impact ability to perform ADLs (bathing, care of mouth/teeth, toileting, grooming, dressing, etc )  - Assess/evaluate cause of self-care deficits   - Assess range of motion  - Assess patient's mobility  - Assess patient's need for assistive devices and provide as appropriate  - Encourage maximum independence but intervene and supervise when necessary  - Involve family in performance of ADLs  - Assess for home care needs following discharge   - Consider OT consult to assist with ADL evaluation and planning for discharge  - Provide patient education as appropriate  Outcome: Progressing  Goal: Maintain proper alignment of affected body part  Description  INTERVENTIONS:  - Support, maintain and protect limb and body alignment  - Provide patient/ family with appropriate education  Outcome: Progressing

## 2020-03-01 NOTE — ANESTHESIA PREPROCEDURE EVALUATION
Review of Systems/Medical History  Patient summary reviewed  Chart reviewed      Cardiovascular  Hyperlipidemia,    Pulmonary       GI/Hepatic    GERD well controlled,             Endo/Other     GYN       Hematology   Musculoskeletal       Neurology    Headaches,    Psychology   Anxiety, Depression ,              Physical Exam    Airway    Mallampati score: I  TM Distance: >3 FB  Neck ROM: full     Dental       Cardiovascular      Pulmonary      Other Findings        Anesthesia Plan  ASA Score- 2     Anesthesia Type- general with ASA Monitors  Additional Monitors:   Airway Plan: ETT  Plan Factors-    Induction- intravenous  Postoperative Plan- Plan for postoperative opioid use  Planned trial extubation    Informed Consent- Anesthetic plan and risks discussed with patient  I personally reviewed this patient with the CRNA  Discussed and agreed on the Anesthesia Plan with the CRNA  Alma Mejias

## 2020-03-01 NOTE — H&P
Micheline Benton Magill 75 y o  female MRN: [id number]  Unit/Bed#: X ray      Chief Complaint:   left hip pain    HPI:   75 y o  female ambulates with cane status post fall while walking her dog complaining of left hip pain and inability to bear weight  Pain is well localized to the hip and is made worse with motion or contact to the area  Denies numbness or tingling  Review Of Systems:   · Skin: Normal  · Neuro: See HPI  · Musculoskeletal: See HPI  · 14 point review of systems negative except as stated above     Past Medical History:   Past Medical History:   Diagnosis Date    Anxiety     Depression     GERD (gastroesophageal reflux disease)     Panic attacks     Psychiatric disorder     anxiety, depression       Past Surgical History:   Past Surgical History:   Procedure Laterality Date    BLADDER SUSPENSION  1980    Mesh    CHOLECYSTECTOMY      COLONOSCOPY      GALLBLADDER SURGERY      [place] LAP,CHOLECYSTECTOMY N/A 7/11/2016    Procedure: LAPAROSCOPIC CHOLECYSTECTOMY ;  Surgeon: Pieter Henry DO;  Location[facility];  Service: General  Last assessed: 5/24/16    TUBAL LIGATION  1976    URETHRA SURGERY         Family History:  Family history reviewed and non-contributory  Family History   Problem Relation Age of Onset    Other Mother         Methicillin Resistant Staphylococcus Aureus Infection    Mental illness Mother     Lung cancer Father        Social History:  Social History     Socioeconomic History    Marital status:       Spouse name: Not on file    Number of children: Not on file    Years of education: 15    Highest education level: Not on file   Occupational History    Occupation: Retired   Social Needs    Financial resource strain: Not on file    Food insecurity:     Worry: Not on file     Inability: Not on file   [place] needs:     Medical: Not on file     Non-medical: Not on file   Tobacco Use    Smoking status: Former Smoker     Packs/day: 0 00 Years: 15 00     Pack years: 0 00     Types: Cigarettes     Last attempt to quit: 2019     Years since quittin 8    Smokeless tobacco: Never Used   Substance and Sexual Activity    Alcohol use: No    Drug use: No    Sexual activity: Not on file   Lifestyle    Physical activity:     Days per week: Not on file     Minutes per session: Not on file    Stress: Not on file   Relationships    Social connections:     Talks on phone: Not on file     Gets together: Not on file     Attends Sikhism service: Not on file     Active member of club or organization: Not on file     Attends meetings of clubs or organizations: Not on file     Relationship status: Not on file    Intimate partner violence:     Fear of current or ex partner: Not on file     Emotionally abused: Not on file     Physically abused: Not on file     Forced sexual activity: Not on file   Other Topics Concern    Not on file   Social History Narrative    Always uses setbelt    Denied History of Dental care: regularly    Does not exercise    Multiple organ donor    No guns in home    No living will    Pets in the home    Denied history of Power of  in existence    Tea    Water intake, adequate (per day)    Denied history of daily cola consumption       Allergies:    Allergies   Allergen Reactions    Vancomycin Hives    Penicillins Hives and Rash           Labs:  0   Lab Value Date/Time    HCT 37 6 2020 2103    HCT 43 4 10/21/2019 1051    HCT 40 8 10/31/2017 1019    HGB 11 9 2020 2103    HGB 12 9 10/21/2019 1051    HGB 13 0 10/31/2017 1019    INR 1 03 2020    WBC 14 14 (H) 2020 2103    WBC 6 71 10/21/2019 1051    WBC 7 81 10/31/2017 1019    ESR 36 (H) 2017 1453       Meds:    Current Facility-Administered Medications:     [START ON 3/1/2020] atorvastatin (LIPITOR) tablet 20 mg, 20 mg, Oral, Daily, MD Kelly Bustillos  [START ON 3/1/2020] cholecalciferol (VITAMIN D3) tablet 1,000 Units, 1,000 Units, Oral, Daily, Diann Arita MD    clonazePAM (KlonoPIN) tablet 0 5 mg, 0 5 mg, Oral, HS, MD Bre Rios  [START ON 3/1/2020] cyanocobalamin (VITAMIN B-12) tablet 1,000 mcg, 1,000 mcg, Oral, Daily, MD Bre Rios  [START ON 3/1/2020] fluticasone (FLONASE) 50 mcg/act nasal spray 2 spray, 2 spray, Nasal, Daily, Diann Arita MD    gabapentin (NEURONTIN) capsule 300 mg, 300 mg, Oral, BID, Diann Arita MD    HYDROmorphone (DILAUDID) injection 1 mg, 1 mg, Intravenous, Once, Eliezer Aase, MD [employer]  [START ON 3/1/2020] lactated ringers infusion, 75 mL/hr, Intravenous, Continuous, MD Bre Sumner Savi ON 3/1/2020] loratadine (CLARITIN) tablet 10 mg, 10 mg, Oral, Daily, MD Bre Rios  [START ON 3/1/2020] magnesium oxide (MAG-OX) tablet 400 mg, 400 mg, Oral, Daily, Diann Arita MD    melatonin tablet 3 mg, 3 mg, Oral, HS, MD Bre Rios  [START ON 3/1/2020] Multi-Vitamin Daily TABS 1 tablet, 1 tablet, Oral, Daily, MD Bre Rioswright ON 3/1/2020] pantoprazole (PROTONIX) EC tablet 40 mg, 40 mg, Oral, Early Morning, MD Bre Rios  [START ON 3/1/2020] venlafaxine (EFFEXOR-XR) 24 hr capsule 150 mg, 150 mg, Oral, Daily, Diann Arita MD    venlafaxine [facility] ) 24 hr capsule 75 mg, 75 mg, Oral, QPM, Diann Arita MD    Current Outpatient Medications:     atorvastatin (LIPITOR) 20 mg tablet, Take 1 tablet (20 mg total) by mouth daily, Disp: 90 tablet, Rfl: 3    Calcium 600 MG tablet, Take by mouth, Disp: , Rfl:     Cholecalciferol (VITAMIN D3) 1000 units CAPS, Take by mouth, Disp: , Rfl:     clonazePAM (KlonoPIN) 0 5 mg tablet, Take 1 tablet (0 5 mg total) by mouth daily at bedtime, Disp: 30 tablet, Rfl: 0    cyanocobalamin 1000 MCG tablet, Take by mouth, Disp: , Rfl:     fluticasone (FLONASE) 50 mcg/act nasal spray, 2 sprays into each nostril daily, Disp: 1 Bottle, Rfl: 5    gabapentin (NEURONTIN) 300 mg capsule, Take 1 capsule by mouth three times daily  (Patient taking differently: Take 300 mg by mouth 2 (two) times a day ), Disp: 270 capsule, Rfl: 0    Magnesium Oxide -Mg Supplement 400 MG CAPS, Take 1 capsule by mouth daily, Disp: , Rfl:     Melatonin 1 MG CAPS, Take by mouth, Disp: , Rfl:     meloxicam (MOBIC) 7 5 mg tablet, Take 1 tablet by mouth daily  , Disp: 30 tablet, Rfl: 0    Multiple Vitamin (MULTI-VITAMIN DAILY) TABS, Take by mouth, Disp: , Rfl:     omeprazole (PriLOSEC) 40 MG capsule, Take 1 capsule (40 mg total) by mouth daily, Disp: 30 capsule, Rfl: 5    venlafaxine (EFFEXOR-XR) 150 mg 24 hr capsule, 1 cap q am, Disp: 90 capsule, Rfl: 3    venlafaxine (EFFEXOR-XR) 75 mg 24 hr capsule, Take 1 capsule (75 mg total) by mouth daily 1 capsule q evening, Disp: 90 capsule, Rfl: 3    benzonatate (TESSALON) 200 MG capsule, Take 1 capsule (200 mg total) by mouth 3 (three) times a day as needed for cough, Disp: 90 capsule, Rfl: 1    loratadine (CLARITIN) 10 mg tablet, Take 1 tablet (10 mg total) by mouth daily, Disp: 90 tablet, Rfl: 1    Blood Culture:   No results found for: BLOODCX    Wound Culture:   No results found for: WOUNDCULT    Ins and Outs:  No intake/output data recorded  Physical Exam:   /67 (BP Location: Left arm)   Pulse 98   Temp 97 8 °F (36 6 °C) (Oral)   Resp 22   Wt 74 4 kg (164 lb)   SpO2 98%   BMI 29 05 kg/m²   Gen: Alert and oriented to person, place, time  HEENT: EOMI, eyes clear, moist mucus membranes, hearing intact  Respiratory: Bilateral chest rise   No audible wheezing found  Cardiovascular: Regular Rate and intact distal pulses   Abdomen: soft nontender/nondistended  Musculoskeletal: left lower extremity  · Skin intact, limb shortened and externally rotated  · Tender to palpation over hip  · Pain with internal/external rotation of the hip  · Sensation intact L3-S1  · Intact ankle dorsi/plantar flexion, EHL/FHL  Brisk capillary refill to the foot and toes   Radiology:   I personally reviewed the films  X-rays left hip shows Intertrochanteric femur fracture    _*_*_*_*_*_*_*_*_*_*_*_*_*_*_*_*_*_*_*_*_*_*_*_*_*_*_*_*_*_*_*_*_*_*_*_*_*_*_*_*_*    Assessment:  75 y  o female status post fall with leftIntertrochanteric femur fracture that will require operative fixation tomorrow     Plan:   · Non weight bearing left lower extremity  · Analgesics for pain  · NPO at midnight  · [place] Catheter insertion  · Medicine consult for all medical management and preoperative risk stratification  · To OR for IM nail femur fracture of Intertrochanteric femur fracture  · Dispo: pending Pop Dunn MD

## 2020-03-01 NOTE — ASSESSMENT & PLAN NOTE
Operative procedure to be done by Orthopedics  Plans for physical therapy/occupational therapy and post discharge planning per primary service

## 2020-03-01 NOTE — ASSESSMENT & PLAN NOTE
Patient last operative procedure was for gallbladder calculus in 2016 and as per patient without any postoperative complication  Patient does not have history of heart failure or arrhythmia; no history of stroke or liver insufficiency  Likewise, patient does not have any acute concerns such as fever, cough etcetera; no chest pain or dizziness headache etcetera; therefore patient seems to be optimized at this point for intended procedure  We placed this patient's risk at mild  Patient can go for intended procedure  Please avoid anemia

## 2020-03-01 NOTE — ASSESSMENT & PLAN NOTE
· S/p surgical intervention by Orthopaedics today, POD#0  · Plan as per primary  · Pain control per primary

## 2020-03-01 NOTE — PROGRESS NOTES
Subjective: No acute events overnight  No acute distress  Objective:  A 10 point ROS was performed; negative except as noted above       Lab Results   Component Value Date/Time    WBC 7 04 03/01/2020 05:00 AM    HGB 11 2 (L) 03/01/2020 05:00 AM       Vitals:    02/29/20 2222   BP: 119/60   Pulse: 91   Resp: 16   Temp: 98 8 °F (37 1 °C)   SpO2: 95%     Left lower extremity  Skin intact   Motor intact to EHL/FHL/TA/GS  Sensation intact to light touch to dp/sp/tib/anna marie/saph distributions  Toes warm and well perfused with brisk capillary refill    Assessment: 75 y o  female with Left IT fracture      Plan:  NWB  left lower extremity   Plan for OR today for IMN   NPO prior to OR  Pain control  DVT ppx: Sequential compression device (Venodyne)  hold other anticoagulation prior to OR  PT/OT eval post op   Will continue to assess for acute blood loss anemia  Dispo: pending OR

## 2020-03-01 NOTE — CONSULTS
Consult- [facility] 1955, 75 y o  female MRN: [id number]    Unit/Bed#: ED 02 Encounter: [id number]    Primary Care Provider: Grace Philip MD   Date and time admitted to hospital: 2/29/2020  6:24 PM      Consult to orthopedics  Consult performed by: Darrel Nagy MD  Consult ordered by: Newton Sheldon MD          * Preoperative examination  Assessment & Plan  Patient last operative procedure was for gallbladder calculus in 2016 and as per patient without any postoperative complication  Patient does not have history of heart failure or arrhythmia; no history of stroke or liver insufficiency  Likewise, patient does not have any acute concerns such as fever, cough etcetera; no chest pain or dizziness headache etcetera; therefore patient seems to be optimized at this point for intended procedure  We placed this patient's risk at mild  Patient can go for intended procedure  Please avoid anemia  Closed displaced basicervical fracture of left femur [facility])  Assessment & Plan  Operative procedure to be done by Orthopedics  Plans for physical therapy/occupational therapy and post discharge planning per primary service  Osteoarthritis  Assessment & Plan  Continue vitamin-D/calcium supplementation  Moderate major depression (HCC)  Assessment & Plan  Currently on Effexor  mg in the morning and 75 mg at night  Gastroesophageal reflux disease  Assessment & Plan  Continue pantoprazole/omeprazole  Anxiety  Assessment & Plan  Continue Klonopin 0 5 mg at night  Also on melatonin 3 mg at night for sleep  Allergic rhinitis  Assessment & Plan  Continue Flonase  Other hyperlipidemia  Assessment & Plan  On atorvastatin 20 mg daily        CONSULT FROM:  Orthopedic/emergency room  CONSULT TO: Dr Vazquez [place] of [place] [address] Internal Medicine      This is a 49-year-old female with no significant history of coronary artery disease, heart failure, liver insufficiency, strokes and has history of osteoarthritis, osteoporosis, depression and anxiety, hyperlipidemia and allergic nasal rhinitis  She comes in due to an accidental fall essentially having been pulled by the pet dog on the ground incurring a fall on the left side and pain at the left leg  Initially, the patient was seen to have a shortenedand outward turned left lower extremity  Patient therefore was seen in the emergency room of [facility] and x-ray did show the presence of a femoral fracture on the left side  Patient denies any chest pain, headache, dizziness, loss of consciousness, fever, cough or cold, nausea, vomiting, dysuria or any other symptoms        Review of Systems:    Constitutional:  Denies fever or chills   Eyes:  Denies change in visual acuity   HENT:  Denies nasal congestion or sore throat   Respiratory:  Denies cough or shortness of breath   Cardiovascular:  Denies chest pain or edema   GI:  Denies abdominal pain, nausea, vomiting, bloody stools or diarrhea   :  Denies dysuria   Musculoskeletal:  Denies back pain with noted joint pain at the left hip  Integument:  Denies rash   Neurologic:  Denies headache, focal weakness or sensory changes   Endocrine:  Denies polyuria or polydipsia   Lymphatic:  Denies swollen glands   Psychiatric:  Denies depression or anxiety     Past Medical and Surgical History:     Past Medical History:   Diagnosis Date    Anxiety     Depression     GERD (gastroesophageal reflux disease)     Panic attacks     Psychiatric disorder     anxiety, depression       Past Surgical History:   Procedure Laterality Date    BLADDER SUSPENSION  1980    Mesh    CHOLECYSTECTOMY      COLONOSCOPY      GALLBLADDER SURGERY      [place] LAP,CHOLECYSTECTOMY N/A 7/11/2016    Procedure: LAPAROSCOPIC CHOLECYSTECTOMY ;  Surgeon: Elsa Motley DO;  [facility];  Service: General  Last assessed: 5/24/16    TUBAL LIGATION  1976    URETHRA SURGERY         Meds/Allergies:      (Not in a hospital admission)  atorvastatin (LIPITOR) 20 mg tablet Take 1 tablet (20 mg total) by mouth daily Shae Duke MD Reordered   Ordered as: atorvastatin (LIPITOR) tablet 20 mg - 20 mg, Oral, Daily, First dose on Sun 3/1/20 at 0900   Calcium 600 MG tablet Take by mouth Shae Duke MD Not Ordered   Cholecalciferol (VITAMIN D3) 1000 units CAPS Take by mouth Shae Duke MD Reordered   Ordered as: cholecalciferol (VITAMIN D3) tablet 1,000 Units - 1,000 Units, Oral, Daily, First dose on Sun 3/1/20 at 0900 25 mcg = 1000 units of cholecalciferol (Vitamin D3)    clonazePAM (KlonoPIN) 0 5 mg tablet Take 1 tablet (0 5 mg total) by mouth daily at bedtime Shae Duke MD Reordered   Ordered as: clonazePAM (KlonoPIN) tablet 0 5 mg - 0 5 mg, Oral, Daily at bedtime, First dose on Sat 2/29/20 at 2200 High alert medication  LOOK ALI SOUND ALIKE MED    cyanocobalamin 1000 MCG tablet Take by mouth Shae Duke MD Reordered   Ordered as: cyanocobalamin (VITAMIN B-12) tablet 1,000 mcg - 1,000 mcg, Oral, Daily, First dose on Sun 3/1/20 at 0900   fluticasone (FLONASE) 50 mcg/act nasal spray 2 sprays into each nostril daily Shae Duke MD Reordered   Ordered as: fluticasone (FLONASE) 50 mcg/act nasal spray 2 spray - 2 spray, Nasal, Daily, First dose on Sun 3/1/20 at 0900 LOOK ALI SOUND ALI MED    gabapentin (NEURONTIN) 300 mg capsule Take 1 capsule by mouth three times daily   Shae Duke MD Reordered    Patient taking differently: Take 300 mg by mouth 2 (two) times a day      Ordered as: gabapentin (NEURONTIN) capsule 300 mg - 300 mg, Oral, 2 times daily, First dose on Sat 2/29/20 at 2115 LOOK ALI S[employer] ALIKE MED    Magnesium Oxide -Mg Supplement 400 MG CAPS Take 1 capsule by mouth daily Shae Duke MD Reordered   Ordered as: magnesium oxide (MAG-OX) tablet 400 mg - 400 mg, Oral, Daily, First dose on Sun 3/1/20 at 0900   Melatonin 1 MG CAPS Take by mouth Shae Duke MD Reordered   Ordered as: melatonin tablet 3 mg - 3 mg, Oral, Daily at bedtime, First dose on Sat 2/29/20 at 2200   meloxicam (MOBIC) 7 5 mg tablet Take 1 tablet by mouth daily  Davey Mann MD Not Ordered   Multiple Vitamin (MULTI-VITAMIN DAILY) TABS Take by mouth Davey Mann MD Reordered   Ordered as: Multi-Vitamin Daily TABS 1 tablet - 1 tablet, Oral, Daily, First dose on Sun 3/1/20 at 0900   omeprazole (PriLOSEC) 40 MG capsule Take 1 capsule (40 mg total) by mouth daily Davey Mann MD Reordered   Ordered as: pantoprazole (PROTONIX) EC tablet 40 mg - 40 mg, Oral, Daily (early morning), First dose on Sun 3/1/20 at 0600 Swallow whole; do not crush, chew or split  LOOK ALIKE SOUND ALIKE MED    venlafaxine (EFFEXOR-XR) 150 mg 24 hr capsule 1 cap q am Davey Mann MD Reordered   Ordered as: venlafaxine (EFFEXOR-XR) 24 hr capsule 150 mg - 150 mg, Oral, Daily, First dose on Sun 3/1/20 at 0900 Swallow whole or open and sprinkle on applesauce; do not crush or chew  LOOK ALIKE SOUND ALIKE MED    venlafaxine (EFFEXOR-XR) 75 mg 24 hr capsule Take 1 capsule (75 mg total) by mouth daily 1 capsule q evening Davey Mann MD Reordered   Ordered as: venlafaxine (EFFEXOR-XR) 24 hr capsule 75 mg - 75 mg, Oral, Every evening, First dose on Sat 2/29/20 at 2115 Patient takes 225 in the morning 75 in the evening  Swallow whole or open and sprinkle on applesauce; do not crush or chew  LOOK ALIKE SOUND ALIKE MED    benzonatate (TESSALON) 200 MG capsule Take 1 capsule (200 mg total) by mouth 3 (three) times a day as needed for cough Davey Mann MD Not Ordered   loratadine (CLARITIN) 10 mg tablet Take 1 tablet (10 mg total) by mouth daily Davey Mann MD Reordered   Ordered as: loratadine (CLARITIN) tablet 10 mg - 10 mg, Oral, Daily, First dose on Sun 3/1/20 at 0900       Allergies: Allergies   Allergen Reactions    Vancomycin Hives    Penicillins Hives and Rash     History:     Marital Status:      Substance Use History: Social History     Substance and Sexual Activity   Alcohol Use No     Social History     Tobacco Use   Smoking Status Former Smoker    Packs/day: 0 00    Years: 15 00    Pack years: 0 00    Types: Cigarettes    Last attempt to quit: 2019    Years since quittin 8   Smokeless Tobacco Never Used     Social History     Substance and Sexual Activity   Drug Use No       Family History:    Family History   Problem Relation Age of Onset    Other Mother         Methicillin Resistant Staphylococcus Aureus Infection    Mental illness Mother     Lung cancer Father        Physical Exam:     Vitals:   Blood Pressure: 112/67 (20)  Pulse: 98 (20)  Temperature: 97 8 °F (36 6 °C) (20)  Temp Source: Oral (20)  Respirations: 22 (20)  Weight - Scale: 74 4 kg (164 lb) (20)  SpO2: 98 % (20)    Constitutional:  Well developed, well nourished, no acute distress, non-toxic appearance and can speak in long sentences with out any cardiorespiratory distress  Eyes:  PERRL, conjunctiva normal   HENT:  Atraumatic, external ears normal, nose normal, oropharynx moist, no pharyngeal exudates  Neck- normal range of motion, no tenderness, supple   Respiratory:  No respiratory distress, normal breath sounds, no rales, no wheezing   Cardiovascular:  Normal rate, normal rhythm, no murmurs, no gallops, no rubs   GI:  Soft, nondistended, normal bowel sounds, nontender, no organomegaly, no mass, no rebound, no guarding   :  No costovertebral angle tenderness   Musculoskeletal:  No edema, tenderness noted at the left hip and it is noted to have short outward turned left lower extremity  Integument:  Well hydrated, no rash   Lymphatic:  No lymphadenopathy noted   Neurologic:  Alert & oriented x 3, CN 2-12 normal, normal motor function, normal sensory function, no focal deficits noted   Psychiatric:  Speech and behavior appropriate       Lab Results:  I Have Reviewed All Lab Data Below:  Laboratory still pending            Invalid input(s): LABALBU        * Additional Pertinent Lab Tests Reviewed: Labs Pending At The Time Of This Note    Imaging: I have personally reviewed pertinent reports  No results found  Counseling / Coordination of Care Time: 30 minutes  Greater than 50% of total time spent on patient counseling and coordination of care  Collaboration of Care:  Were Recommendations Directly Discussed with Primary Treatment Team? - No     ** Please Note: Dragon 360 Dictation speech to text software was used in the creation of this document **

## 2020-03-01 NOTE — ED PROVIDER NOTES
History  Chief Complaint   Patient presents with    Fall     Pt was walking the dog and the dog went the opposite direction and she fell onto her left hip  Pt c/o severe hip pain  Pt denies hitting head, no loc  HPI  76 yo female presents to the ED with left hip and left thigh pain s/p fall this evening  Pt reports she was walking her dog when she tripped and fell onto her left side  Denies hitting her head or losing consciousness  Fall was witnessed by a neighbor, and multiple people were required to get her off the ground and into her house  Pt has not been able to bear weight on the affected leg  Denies numbness or weakness in the foot  Pt is not on any blood thinning medications  Denies headache, neck pain, back pain, abdominal pain, pain in her other extremities  Prior to Admission Medications   Prescriptions Last Dose Informant Patient Reported? Taking? Calcium 600 MG tablet   Yes Yes   Sig: Take by mouth   Cholecalciferol (VITAMIN D3) 1000 units CAPS   Yes Yes   Sig: Take by mouth   Magnesium Oxide -Mg Supplement 400 MG CAPS   Yes Yes   Sig: Take 1 capsule by mouth daily   Melatonin 1 MG CAPS   Yes Yes   Sig: Take by mouth   Multiple Vitamin (MULTI-VITAMIN DAILY) TABS   Yes Yes   Sig: Take by mouth   atorvastatin (LIPITOR) 20 mg tablet   No Yes   Sig: Take 1 tablet (20 mg total) by mouth daily   benzonatate (TESSALON) 200 MG capsule   No No   Sig: Take 1 capsule (200 mg total) by mouth 3 (three) times a day as needed for cough   clonazePAM (KlonoPIN) 0 5 mg tablet   No Yes   Sig: Take 1 tablet (0 5 mg total) by mouth daily at bedtime   cyanocobalamin 1000 MCG tablet   Yes Yes   Sig: Take by mouth   fluticasone (FLONASE) 50 mcg/act nasal spray   No Yes   Si sprays into each nostril daily   gabapentin (NEURONTIN) 300 mg capsule   No Yes   Sig: Take 1 capsule by mouth three times daily     Patient taking differently: Take 300 mg by mouth 2 (two) times a day    loratadine (CLARITIN) 10 mg tablet No No   Sig: Take 1 tablet (10 mg total) by mouth daily   meloxicam (MOBIC) 7 5 mg tablet   No Yes   Sig: Take 1 tablet by mouth daily  omeprazole (PriLOSEC) 40 MG capsule   No Yes   Sig: Take 1 capsule (40 mg total) by mouth daily   venlafaxine (EFFEXOR-XR) 150 mg 24 hr capsule   No Yes   Si cap q am   venlafaxine (EFFEXOR-XR) 75 mg 24 hr capsule   No Yes   Sig: Take 1 capsule (75 mg total) by mouth daily 1 capsule q evening      Facility-Administered Medications: None       Past Medical History:   Diagnosis Date    Anxiety     Depression     GERD (gastroesophageal reflux disease)     Panic attacks     Psychiatric disorder     anxiety, depression       Past Surgical History:   Procedure Laterality Date    BLADDER SUSPENSION      Mesh    CHOLECYSTECTOMY      COLONOSCOPY      GALLBLADDER SURGERY      [place] LAP,CHOLECYSTECTOMY N/A 2016    Procedure: LAPAROSCOPIC CHOLECYSTECTOMY ;  Surgeon: Carl Dance, DO;  [facility];  Service: General  Last assessed: 16    TUBAL LIGATION      URETHRA SURGERY         Family History   Problem Relation Age of Onset    Other Mother         Methicillin Resistant Staphylococcus Aureus Infection    Mental illness Mother     Lung cancer Father      I have reviewed and agree with the history as documented  E-Cigarette/Vaping     E-Cigarette/Vaping Substances     Social History     Tobacco Use    Smoking status: Former Smoker     Packs/day: 0 00     Years: 15 00     Pack years: 0 00     Types: Cigarettes     Last attempt to quit: 2019     Years since quittin 8    Smokeless tobacco: Never Used   Substance Use Topics    Alcohol use: No    Drug use: No        Review of Systems   Constitutional: Negative for chills and fever  HENT: Negative for congestion, rhinorrhea and sore throat  Respiratory: Negative for chest tightness and shortness of breath  Cardiovascular: Negative for chest pain     Gastrointestinal: Negative for abdominal pain, diarrhea, nausea and vomiting  Genitourinary: Negative for dysuria and hematuria  Musculoskeletal: Positive for arthralgias, gait problem and myalgias  Negative for neck pain  Skin: Negative for rash  Neurological: Negative for dizziness, syncope, weakness, light-headedness, numbness and headaches  All other systems reviewed and are negative  Physical Exam  ED Triage Vitals [02/29/20 1830]   Temperature Pulse Respirations Blood Pressure SpO2   97 8 °F (36 6 °C) 104 20 (!) 171/73 100 %      Temp Source Heart Rate Source Patient Position - Orthostatic VS BP Location FiO2 (%)   Oral Monitor Lying Left arm --      Pain Score       Worst Possible Pain             Orthostatic Vital Signs  Vitals:    02/29/20 1830 02/29/20 1834 02/29/20 1920 02/29/20 2137   BP: (!) 171/73 (!) 171/73 112/67 97/50   Pulse: 104 104 98 93   Patient Position - Orthostatic VS: Lying Lying Lying Lying       Physical Exam   Constitutional: She is oriented to person, place, and time  She appears well-developed and well-nourished  She appears distressed (appears uncomfortable)  HENT:   Head: Normocephalic and atraumatic  Right Ear: External ear normal    Left Ear: External ear normal    Nose: Nose normal    Eyes: Pupils are equal, round, and reactive to light  Conjunctivae and EOM are normal    Neck: Normal range of motion  Neck supple  Cardiovascular: Regular rhythm, normal heart sounds and intact distal pulses  Tachycardia present  Exam reveals no gallop and no friction rub  No murmur heard  Pulses:       Dorsalis pedis pulses are 2+ on the right side, and 2+ on the left side  Posterior tibial pulses are 2+ on the right side, and 2+ on the left side  Pulmonary/Chest: Effort normal and breath sounds normal  No respiratory distress  She has no wheezes  She has no rhonchi  She has no rales  Abdominal: Soft  Bowel sounds are normal  She exhibits no distension and no mass  There is no tenderness   There is no rebound and no guarding  Musculoskeletal:        Left hip: She exhibits decreased range of motion, tenderness and bony tenderness  Left knee: She exhibits no swelling  No tenderness found  Left ankle: She exhibits normal range of motion and no swelling  No tenderness  Left upper leg: She exhibits tenderness, bony tenderness and deformity  LLE flexed at the knee and externally rotated   Lymphadenopathy:     She has no cervical adenopathy  Neurological: She is alert and oriented to person, place, and time  She has normal strength  No cranial nerve deficit or sensory deficit  Pt able to move toes in both feet   Skin: Skin is warm and dry  Abrasion (left knee) noted  She is not diaphoretic  Psychiatric: She has a normal mood and affect  Nursing note and vitals reviewed        ED Medications  Medications   HYDROmorphone (DILAUDID) injection 1 mg (has no administration in time range)   lactated ringers infusion (has no administration in time range)   acetaminophen (TYLENOL) tablet 975 mg (has no administration in time range)   methocarbamol (ROBAXIN) tablet 500 mg (has no administration in time range)   oxyCODONE (ROXICODONE) IR tablet 5 mg (has no administration in time range)   oxyCODONE (ROXICODONE) immediate release tablet 10 mg (has no administration in time range)   HYDROmorphone (DILAUDID) injection 0 5 mg (has no administration in time range)   atorvastatin (LIPITOR) tablet 20 mg (has no administration in time range)   cholecalciferol (VITAMIN D3) tablet 1,000 Units (has no administration in time range)   clonazePAM (KlonoPIN) tablet 0 5 mg (has no administration in time range)   cyanocobalamin (VITAMIN B-12) tablet 1,000 mcg (has no administration in time range)   fluticasone (FLONASE) 50 mcg/act nasal spray 2 spray (has no administration in time range)   gabapentin (NEURONTIN) capsule 300 mg (has no administration in time range)   loratadine (CLARITIN) tablet 10 mg (has no administration in time range)   magnesium oxide (MAG-OX) tablet 400 mg (has no administration in time range)   melatonin tablet 3 mg (has no administration in time range)   Multi-Vitamin Daily TABS 1 tablet (has no administration in time range)   pantoprazole (PROTONIX) EC tablet 40 mg (has no administration in time range)   venlafaxine (EFFEXOR-XR) 24 hr capsule 150 mg (has no administration in time range)   venlafaxine (EFFEXOR-XR) 24 hr capsule 75 mg (has no administration in time range)   morphine (PF) 10 mg/mL injection 6 mg (6 mg Intravenous Given 2/29/20 1844)   HYDROmorphone (DILAUDID) injection 1 mg (1 mg Intravenous Given 2/29/20 1918)       Diagnostic Studies  Results Reviewed     Procedure Component Value Units Date/Time    Basic metabolic panel [[id number]]  (Abnormal) Collected:  02/29/20 2103    Lab Status:  Final result Specimen:  Blood from Arm, Right Updated:  02/29/20 2126     Sodium 142 mmol/L      Potassium 4 4 mmol/L      Chloride 109 mmol/L      CO2 24 mmol/L      ANION GAP 9 mmol/L      BUN 11 mg/dL      Creatinine 0 92 mg/dL      Glucose 96 mg/dL      Calcium 8 8 mg/dL      eGFR 65 ml/min/1 73sq m     Narrative:       [place] guidelines for Chronic Kidney Disease (CKD):     Stage 1 with normal or high GFR (GFR > 90 mL/min/1 73 square meters)    Stage 2 Mild CKD (GFR = 60-89 mL/min/1 73 square meters)    Stage 3A Moderate CKD (GFR = 45-59 mL/min/1 73 square meters)    Stage 3B Moderate CKD (GFR = 30-44 mL/min/1 73 square meters)    Stage 4 Severe CKD (GFR = 15-29 mL/min/1 73 square meters)    Stage 5 End Stage CKD (GFR <15 mL/min/1 73 square meters)  Note: GFR calculation is accurate only with a steady state creatinine    Protime-INR [[id number]]  (Normal) Collected:  02/29/20 2103    Lab Status:  Final result Specimen:  Blood from Arm, Right Updated:  02/29/20 2123     Protime 13 1 seconds      INR 1 03    APTT [[id number]]  (Normal) Collected:  02/29/20 2103 Lab Status:  Final result Specimen:  Blood from Arm, Right Updated:  02/29/20 2123     PTT 28 seconds     CBC [[id number]]  (Abnormal) Collected:  02/29/20 2103    Lab Status:  Final result Specimen:  Blood from Arm, Right Updated:  02/29/20 2113     WBC 14 14 Thousand/uL      RBC 4 11 Million/uL      Hemoglobin 11 9 g/dL      Hematocrit 37 6 %      MCV 92 fL      MCH 29 0 pg      MCHC 31 6 g/dL      RDW 13 8 %      Platelets 937 Thousands/uL      MPV 9 5 fL                  XR hip/pelv 2-3 vws left if performed   ED Interpretation by Vernona Sever, MD (02/29 2053)   Left intertrochanteric fracture      XR femur 2 views LEFT    (Results Pending)   XR knee 4+ vw left injury    (Results Pending)   XR chest portable    (Results Pending)         Procedures  Procedures      ED Course  ED Course as of Feb 29 2154   Sat Feb 29, 2020 2053 Ortho will admit  SLIM will consult for clearance  XR hip/pelv 2-3 vws left if performed           Identification of Seniors at Risk      Most Recent Value   (ISAR) Identification of Seniors at Risk   Before the illness or injury that brought you to the Emergency, did you need someone to help you on a regular basis? 0 Filed at: 02/29/2020 1832   In the last 24 hours, have you needed more help than usual?  0 Filed at: 02/29/2020 3658   Have you been hospitalized for one or more nights during the past 6 months? 0 Filed at: 02/29/2020 1832   In general, do you see well?  0 Filed at: 02/29/2020 1832   In general, do you have serious problems with your memory? 0 Filed at: 02/29/2020 1832   Do you take more than three different medications every day? 1 Filed at: 02/29/2020 1832   ISAR Score  1 Filed at: 02/29/2020 1832                          MDM    76 yo female with left hip and left thigh pain s/p fall  Will get XR left hip, left femur, left knee to evaluate for acute injury  Will treat with pain medications       Disposition  Final diagnoses:   Intertrochanteric fracture of femur [facility])     Time reflects when diagnosis was documented in both MDM as applicable and the Disposition within this note     Time User Action Codes Description Comment    2/29/2020  8:56 PM Azul Lucas, [address] [facility] Par[place]e Ache Closed displaced basicervical fracture of left femur, initial encounter ([facility] [place] [facility] )     2/29/2020  8:56 PM Azul Lucas, [address] [place] Ache Closed displaced basicervical fracture of left femur, initial encounter ([place] [address] )     2/29/2020  8:56 PM Ismael Doty Add [[id number] [id number]] Intertrochanteric fracture of femur ([facility] [place] [facility] )     2/29/2020  8:56 PM Rudolphsaúl Doty Modify [[id number]] Intertrochanteric fracture of femur ([place] [address] ) Left    2/29/2020  8:56 PM Azul Shayy, [address] [[id number]] Intertrochanteric fracture of femur [facility])       ED Disposition     ED Disposition Condition Date/Time Comment    Admit Stable Sat Feb 29, 2020  9:53 PM Case was discussed with ortho resident Dr Azul Lucas and the patient's admission status was agreed to be Admission Status: inpatient status to the service of Dr Lashay King   Follow-up Information    None         Patient's Medications   Discharge Prescriptions    No medications on file     No discharge procedures on file  PDMP Review       Value Time User    PDMP Reviewed  Yes 2/27/2020  4:24 PM Chey Strickland MD           ED Provider  Attending physically available and evaluated Dina Nogueira  SOURAV managed the patient along with the ED Attending      Electronically Signed by         Mary Ann Damon MD  02/29/20 2922

## 2020-03-01 NOTE — UTILIZATION REVIEW
Notification of Inpatient Admission/Inpatient Authorization Request   This is a Notification of Inpatient Admission for [address]  Be advised that this patient was admitted to our facility under Inpatient Status  Contact Jaycee Aviles at [phone] for additional admission information  [facility]  DEDICATED -[phone]  Patient Name:   Gayathri Pham   YOB: 1955       [address]   [address]:   [place] [address]  Tax ID: [id number]  NPI: [id number] Attending Provider/NPI: Vena Blizzard, Md [[id number]]   Place of Service Code: 24     Place of Service Name:  [address] [place] [address]   Start Date: 2/29/20 2143     Discharge Date & Time: No discharge date for patient encounter  Type of Admission: Inpatient Status Discharge Disposition (if discharged): Home/Self Care   Patient Diagnoses: Hip injury [S79 919A]  Intertrochanteric fracture of femur ([address] ) [S72 143A]  Closed displaced basicervical fracture of left femur, initial encounter [facility]) Niels Leonardo     Orders: Admission Orders (From admission, onward)     Ordered        02/29/20 2143  Inpatient Admission  Once                    Assigned Utilization Review Contact: Jaycee Aviles  Utilization   Network Utilization Review Department  Phone: [phone]; Fax [phone]  Email: [email or web address]   ATTENTION PAYERS: Please call the assigned Utilization  directly with any questions or concerns ALL voicemails in the department are confidential  Send all requests for admission clinical reviews, approved or denied determinations and any other requests to dedicated fax number belonging to the campus where the patient is receiving treatment

## 2020-03-01 NOTE — ANESTHESIA POSTPROCEDURE EVALUATION
Post-Op Assessment Note    CV Status:  Stable  Pain Score: 0    Pain management: adequate     Mental Status:  Alert and awake   Hydration Status:  Euvolemic   PONV Controlled:  Controlled   Airway Patency:  Patent   Post Op Vitals Reviewed: Yes      Staff: CRNA           BP 98/54 (03/01/20 1001)    Temp (!) 97 4 °F (36 3 °C) (03/01/20 1001)    Pulse 84 (03/01/20 1001)   Resp 14 (03/01/20 1001)    SpO2   96 on face mask

## 2020-03-01 NOTE — OP NOTE
OPERATIVE REPORT  PATIENT NAME: Johanna Gautam    :  1955  MRN: [id number]  Pt Location: BE OR ROOM 09    SURGERY DATE: 3/1/2020    Surgeon(s) and Role:     * Ju Marquez MD - Primary     * India Rosas MD - Assisting     * Juanita Licea - Assisting    Preop Diagnosis:  Intertrochanteric fracture of femur ([facility] ) Megan Box    Post-Op Diagnosis Codes:     * Intertrochanteric fracture of femur ([address] ) Megan Box    Procedure(s) (LRB):  INSERTION NAIL IM FEMUR ANTEGRADE (TROCHANTERIC) (Left)  ORIF left hip with intramedullary nail  Specimen(s):  * No specimens in log *    Estimated Blood Loss:   Minimal    Drains:  Closed/Suction Drain Right RUQ Other (Comment) 8 5 Fr  (Active)   Number of days: 1346       Urethral Catheter 12 Fr  (Active)   Reasons to continue Urinary Catheter  Post-operative urological requirements 3/1/2020 12:26 AM   Site Assessment Clean;Skin intact 3/1/2020 12:26 AM   Collection Container Standard drainage bag 3/1/2020 12:26 AM   Securement Method Securing device (Describe) 3/1/2020 12:26 AM   Output (mL) 160 mL 3/1/2020  5:09 AM   Number of days: 0       Anesthesia Type:   Choice    Operative Indications:  Intertrochanteric fracture of femur ([address] ) [S72 143A]      Operative Findings:   short nail, unlocked distal    Complications:   None    Procedure and Technique:   following induction of adequate level of general anesthesia, this patient was then placed on the fracture table  Bony prominences well padded  Acceptable closed reduction was performed utilizing internal rotation traction and 80 duction  The left hip and lateral thigh were then prepped draped sterilely  Antibiotics administered  The shower curtain was utilized  A small incision made proximally hip  Under fluoroscopic control, the proximal femur was opened up over a terminally threaded K-wire  A beaded tip guide delphine was placed down the medic al the femur  Reaming proximally was to 17    The short nail was introduced in cannulated fashion  When it was sunk to appropriate depth, the beaded tip guide delphine was removed  A lag screw was then placed within the center of the femoral head  This was placed over a terminally threaded K-wire  Which had been reamed but not tapped  The setscrew was then deployed  Final fluoroscopic films document a well-aligned fracture, good hardware position  The wounds then flushed with saline closed  Subcu tissue closed 2 Vicryl suture  The skin was closed to a nylons  Mepilex dressings were applied    She was then awakened from general anesthesia, taken recovery room stable condition with plans to include physical therapy weight-bearing to tolerance, she will require DVT prophylaxis with Lovenox   I was present for the entire procedure    Patient Disposition:  PACU     SIGNATURE: Herbie Love MD  DATE: March 1, 2020  TIME: 9:40 AM

## 2020-03-01 NOTE — PROGRESS NOTES
Progress Note - Linn Juarez 1955, 75 y o  female MRN: [id number]    [facility] Encounter: [id number]    Primary Care Provider: Jesica Reilly MD   Date and time admitted to hospital: 2020  6:24 PM        * Intertrochanteric fracture of femur ([address] )  Assessment & Plan  · S/p surgical intervention by Orthopaedics today, POD#0  · Plan as per primary  · Pain control per primary    Moderate major depression (HCC)  Assessment & Plan  · Continue Effexor     Anxiety  Assessment & Plan  · Continue Klonopin 0 5 mg QHS  · PDMP reviewed    Allergic rhinitis  Assessment & Plan  · Continue Flonase  Other hyperlipidemia  Assessment & Plan  · Continue statin      VTE Pharmacologic Prophylaxis:   Pharmacologic: per primary  Mechanical VTE Prophylaxis in Place: per primary    Patient Centered Rounds: I have performed bedside rounds with nursing staff today  Discussions with Specialists or Other Care Team Provider: nurse    Education and Discussions with Family / Patient: patient    Time Spent for Care: 20 minutes  More than 50% of total time spent on counseling and coordination of care as described above  Current Length of Stay: 1 day(s)    Current Patient Status: Inpatient   Certification Statement: per primary, Ortho  Discharge Plan: per primary  Slim will continue to follow     Code Status: Level 1 - Full Code      Subjective:   Ms Alejandro Yusuf seen post-op  She reports left hip pain  Otherwise denies complaint    Objective:     Vitals:   Temp (24hrs), Av 3 °F (36 8 °C), Min:97 4 °F (36 3 °C), Max:99 1 °F (37 3 °C)    Temp:  [97 4 °F (36 3 °C)-99 1 °F (37 3 °C)] 98 2 °F (36 8 °C)  HR:  [] 85  Resp:  [13-22] 16  BP: ()/(47-74) 106/57  SpO2:  [91 %-100 %] 98 %  Body mass index is 29 05 kg/m²  Input and Output Summary (last 24 hours):        Intake/Output Summary (Last 24 hours) at 3/1/2020 1520  Last data filed at 3/1/2020 1315  Gross per 24 hour   Intake 1202 5 ml Output 160 ml   Net 1042 5 ml       Physical Exam:     Physical Exam   Constitutional: She is oriented to person, place, and time  No distress  Patient seen lying in bed comfortably resting watching videos on her phone   Cardiovascular: Normal rate and regular rhythm  Pulmonary/Chest: Effort normal and breath sounds normal  No respiratory distress  She has no wheezes  Abdominal: Soft  Bowel sounds are normal  There is no tenderness  Musculoskeletal: She exhibits no edema  Left hip dressing CDI  Palpable b/l DP pulses   Neurological: She is alert and oriented to person, place, and time  Skin: Skin is warm  She is not diaphoretic  Psychiatric: She has a normal mood and affect  Her behavior is normal    Vitals reviewed  Additional Data:     Labs:    Results from last 7 days   Lab Units 03/01/20  0500   WBC Thousand/uL 7 04   HEMOGLOBIN g/dL 11 2*   HEMATOCRIT % 36 1   PLATELETS Thousands/uL 350   NEUTROS PCT % 54   LYMPHS PCT % 36   MONOS PCT % 9   EOS PCT % 1     Results from last 7 days   Lab Units 03/01/20  0500   SODIUM mmol/L 141   POTASSIUM mmol/L 4 3   CHLORIDE mmol/L 109*   CO2 mmol/L 25   BUN mg/dL 14   CREATININE mg/dL 1 19   ANION GAP mmol/L 7   CALCIUM mg/dL 8 9   GLUCOSE RANDOM mg/dL 92     Results from last 7 days   Lab Units 02/29/20  2103   INR  1 03                       * I Have Reviewed All Lab Data Listed Above  * Additional Pertinent Lab Tests Reviewed:  All Labs Within Last 24 Hours Reviewed    Imaging:    Imaging Reports Reviewed Today Include: XR hip/pelv  Imaging Personally Reviewed by Myself Includes:  XR hip/pelv    Recent Cultures (last 7 days):           Last 24 Hours Medication List:     Current Facility-Administered Medications:  acetaminophen 975 mg Oral [facility] Karely Ayala MD    atorvastatin 20 mg Oral Daily Karely Ayala MD    cefazolin 2,000 mg Intravenous Renetta Hathaway MD    cholecalciferol 1,000 Units Oral Daily Karely Ayala MD    clonazePAM 0 5 mg Oral HS Neelam Leatha Katz MD    cyanocobalamin 1,000 mcg Oral Daily Ronald Morales MD    enoxaparin 40 mg Subcutaneous Q24H Judie Gillespie MD    fluticasone 2 spray Nasal Daily Ronald Morales MD    gabapentin 300 mg Oral BID Ronald Morales MD    HYDROmorphone 0 5 mg Intravenous Q4H PRN Ronald Morales MD    HYDROmorphone 1 mg Intravenous Once Ronald Morales MD    lactated ringers 75 mL/hr Intravenous Continuous Ronald Morales MD Last Rate: 75 mL/hr (03/01/20 1059)   loratadine 10 mg Oral Daily Ronald Morales MD    magnesium oxide 400 mg Oral Daily Ronald Morales MD    melatonin 3 mg Oral HS Ronald Morales MD    methocarbamol 500 mg Oral Q6H Judie Gillespie MD    multivitamin-minerals 1 tablet Oral Daily Ronald Morales MD    oxyCODONE 10 mg Oral Q4H PRN Ronald Morales MD    oxyCODONE 5 mg Oral Q4H PRN Ronald Morales MD    pantoprazole 40 mg Oral Early Morning Ronald Morales MD    venlafaxine 150 mg Oral Daily Ronald Morales MD    venlafaxine 75 mg Oral QPM Ronald Morales MD         Today, Patient Was Seen By: Patricia Ramires PA-C    ** Please Note: Dictation voice to text software may have been used in the creation of this document   **

## 2020-03-02 PROBLEM — D64.9 ANEMIA: Status: ACTIVE | Noted: 2020-03-02

## 2020-03-02 LAB
ERYTHROCYTE [DISTWIDTH] IN BLOOD BY AUTOMATED COUNT: 14 % (ref 11.6–15.1)
HCT VFR BLD AUTO: 29.9 % (ref 34.8–46.1)
HGB BLD-MCNC: 9.1 G/DL (ref 11.5–15.4)
MCH RBC QN AUTO: 28.7 PG (ref 26.8–34.3)
MCHC RBC AUTO-ENTMCNC: 30.4 G/DL (ref 31.4–37.4)
MCV RBC AUTO: 94 FL (ref 82–98)
PLATELET # BLD AUTO: 254 THOUSANDS/UL (ref 149–390)
PMV BLD AUTO: 9.8 FL (ref 8.9–12.7)
RBC # BLD AUTO: 3.17 MILLION/UL (ref 3.81–5.12)
WBC # BLD AUTO: 6.41 THOUSAND/UL (ref 4.31–10.16)

## 2020-03-02 PROCEDURE — 97163 PT EVAL HIGH COMPLEX 45 MIN: CPT

## 2020-03-02 PROCEDURE — 99222 1ST HOSP IP/OBS MODERATE 55: CPT | Performed by: INTERNAL MEDICINE

## 2020-03-02 PROCEDURE — 97167 OT EVAL HIGH COMPLEX 60 MIN: CPT

## 2020-03-02 PROCEDURE — 99024 POSTOP FOLLOW-UP VISIT: CPT | Performed by: ORTHOPAEDIC SURGERY

## 2020-03-02 PROCEDURE — 85027 COMPLETE CBC AUTOMATED: CPT | Performed by: ORTHOPAEDIC SURGERY

## 2020-03-02 RX ORDER — METHOCARBAMOL 750 MG/1
750 TABLET, FILM COATED ORAL EVERY 6 HOURS SCHEDULED
Status: DISCONTINUED | OUTPATIENT
Start: 2020-03-02 | End: 2020-03-05 | Stop reason: HOSPADM

## 2020-03-02 RX ADMIN — GABAPENTIN 300 MG: 300 CAPSULE ORAL at 08:38

## 2020-03-02 RX ADMIN — OXYCODONE HYDROCHLORIDE 10 MG: 10 TABLET ORAL at 08:39

## 2020-03-02 RX ADMIN — VENLAFAXINE HYDROCHLORIDE 75 MG: 75 CAPSULE, EXTENDED RELEASE ORAL at 17:14

## 2020-03-02 RX ADMIN — VENLAFAXINE HYDROCHLORIDE 150 MG: 150 CAPSULE, EXTENDED RELEASE ORAL at 08:39

## 2020-03-02 RX ADMIN — GABAPENTIN 300 MG: 300 CAPSULE ORAL at 17:05

## 2020-03-02 RX ADMIN — MELATONIN 1000 UNITS: at 08:39

## 2020-03-02 RX ADMIN — OXYCODONE HYDROCHLORIDE 10 MG: 10 TABLET ORAL at 03:41

## 2020-03-02 RX ADMIN — CYANOCOBALAMIN TAB 500 MCG 1000 MCG: 500 TAB at 08:39

## 2020-03-02 RX ADMIN — METHOCARBAMOL TABLETS 750 MG: 750 TABLET, COATED ORAL at 17:05

## 2020-03-02 RX ADMIN — SODIUM CHLORIDE, SODIUM LACTATE, POTASSIUM CHLORIDE, AND CALCIUM CHLORIDE 75 ML/HR: .6; .31; .03; .02 INJECTION, SOLUTION INTRAVENOUS at 00:35

## 2020-03-02 RX ADMIN — SODIUM CHLORIDE, SODIUM LACTATE, POTASSIUM CHLORIDE, AND CALCIUM CHLORIDE 75 ML/HR: .6; .31; .03; .02 INJECTION, SOLUTION INTRAVENOUS at 19:07

## 2020-03-02 RX ADMIN — METHOCARBAMOL TABLETS 750 MG: 750 TABLET, COATED ORAL at 12:11

## 2020-03-02 RX ADMIN — Medication 1 TABLET: at 08:38

## 2020-03-02 RX ADMIN — LORATADINE 10 MG: 10 TABLET ORAL at 08:39

## 2020-03-02 RX ADMIN — ACETAMINOPHEN 975 MG: 325 TABLET ORAL at 06:29

## 2020-03-02 RX ADMIN — FLUTICASONE PROPIONATE 2 SPRAY: 50 SPRAY, METERED NASAL at 12:11

## 2020-03-02 RX ADMIN — ENOXAPARIN SODIUM 40 MG: 40 INJECTION SUBCUTANEOUS at 20:05

## 2020-03-02 RX ADMIN — METHOCARBAMOL TABLETS 500 MG: 500 TABLET, COATED ORAL at 06:29

## 2020-03-02 RX ADMIN — PANTOPRAZOLE SODIUM 40 MG: 40 TABLET, DELAYED RELEASE ORAL at 06:29

## 2020-03-02 RX ADMIN — ACETAMINOPHEN 975 MG: 325 TABLET ORAL at 13:46

## 2020-03-02 RX ADMIN — ATORVASTATIN CALCIUM 20 MG: 20 TABLET, FILM COATED ORAL at 08:39

## 2020-03-02 RX ADMIN — CEFAZOLIN SODIUM 2000 MG: 10 INJECTION, POWDER, FOR SOLUTION INTRAVENOUS at 00:35

## 2020-03-02 RX ADMIN — HYDROMORPHONE HYDROCHLORIDE 0.5 MG: 1 INJECTION, SOLUTION INTRAMUSCULAR; INTRAVENOUS; SUBCUTANEOUS at 06:32

## 2020-03-02 RX ADMIN — HYDROMORPHONE HYDROCHLORIDE 0.5 MG: 1 INJECTION, SOLUTION INTRAMUSCULAR; INTRAVENOUS; SUBCUTANEOUS at 10:39

## 2020-03-02 RX ADMIN — OXYCODONE HYDROCHLORIDE 10 MG: 10 TABLET ORAL at 20:05

## 2020-03-02 RX ADMIN — MAGNESIUM OXIDE TAB 400 MG (240 MG ELEMENTAL MG) 400 MG: 400 (240 MG) TAB at 08:38

## 2020-03-02 RX ADMIN — OXYCODONE HYDROCHLORIDE 10 MG: 10 TABLET ORAL at 13:48

## 2020-03-02 NOTE — CONSULTS
Consultation - Geriatric Medicine   Cynthia Mckenna 75 y o  female MRN: [id number]  Unit/Bed#: [facility] 607-01 Encounter: [id number]      Assessment/Plan     Left intertrochanteric femoral neck fracture  -confirmed on XR hip pelvis 2/29/20  -s/p IM nail 03/01/2020 with Dr Jamaica Blackburn  -continue acute pain control   -continue tx acute blood loss anemia  -continue PT and OT  -may benefit from STR at dc given limited in home support    Acute pain due to trauma  -current pain control per primary team:  Tylenol 975mg Q8H scheduled  Roxicodone 5mg Q4H PRN moderate pain  Roxicodone 10mg Q4H PRN severe pain  Dilaudid 0 5mg Q4H PRN  Robaxin 750mg Q6H scheduled  -currently on home dose Gabapentin 300mg BID (recently decreased from TID as o/p due to fatigue)  -recommend addition of lidocaine patch topically to left lateral thigh   -limit use of Robaxin in Geriatric population due to risk of encephalopathy and increased risk of falls especially in setting of scheduled Robaxin, Gabapentin, Venlafaxine, and Klonipin  -continue to monitor mental status with Roxicodone dosing and recommend tapering down to Geriatric dosing 5mg and 2 5mg as soon as appropriate, continue to monitor and hold for sedation  -encourage addition of non-pharmacologic pain treatment including ice and frequent repositioning  -recommend bowel regimen including senna and colace to prevent and treat constipation due to increased risk with acute pain and opiate pain medications    Acute blood loss anemia  -evidenced by >2g drop Hb from 11 9 pre-op to 9 1 post op  -continue tx per primary team  -continue to monitor closely and transfuse as necessary    Ambulatory dysfunction with fall  -multifactorial including impaired vision, deconditioning, polypharmacy, and use of assistive device at baseline  -requires use of cane at baseline   -continue fall precautions  -keep personal items close to prevent reaching  -encourage assistance all transfers  -encourage good body mechanics  -PT and OT following  -recommend home safety eval on discharge, will place home fall prevention tip sheet in chart to be provided to pt on dc  -recommend personal fall alert system on discharge    Osteoporosis  -evidenced by fragility fracture of right femur at fall from standing height  -recommend checking vitamin-D  -encourage at least 1000 International Units vitamin-D and 1200 mg calcium daily  -TSH 3 570, consider completion of workup for osteoporosis including SPEP/UPEP, PTH  -DEXA scan ordered as outpatient expected June 2020  -recommend outpatient follow-up with PCP for DEXA scan and discussion of antiresorptive therapy options    Impaired Vision  -recommend use of corrective lenses at all appropriate times  -encourage adequate lighting and encourage use of assistance with ambulation  -keep personal belongings close to person to avoid reaching  -encourage appropriate footwear at all times    Impaired mastication  -requires use of dentures, encourage use at all appropriate times  -ensure consistency appropriate for abilities    Moderate Major Depression  -follows with PCP  -symptoms well controlled on Venlafaxine as o/p, continued on admission  -continue close o/p f/u with PCP  -monitor closely with any medication changes  -encourage establishment with counselor as o/p for added support  -continue to encourage family support  -encourage pt to remain physically and socially active     Cognitive screening  -pt complains of forgetfulness and poor short term memory  -mini cog 5/5 today at bedside  -MRI brain 2018 completed for unsteady gait revealed mild microangiopathic changes  -TSH WNL, recommend checking B12 and Vit D  -patient has strong family history of Alzheimer's/dementia on mother side  -polypharmacy likely partly contributing, agree with tapering off of gabapentin as outpatient and treatment of mood as depression often also large contributor  -encourage patient to remain physically, socially, and mentally active  -encourage cognitively stimulating games/activities   -limit sedating medications such as Gabapentin and Benzodiazepines such as Klonopin as these can contribute to reduced cognitive acuity and forgetfulness  -encourage normal sleep/wake cycle and restful sleep  -recommend comprehensive Geriatric assessment as o/p, info placed in DC instructions      Deconditioning/debility/frailty  -multifactorial including ambulatory dysfunction, chronic back pain, left femur fracture, impaired vision, deconditioning, chronic depression, and polypharmacy  -albumin 3 5  -continue good control chronic medical conditions  -continue use of assist device for ambulation as directed by PT/OT    Home medication review  -confirmed with patient at bedside and PCP documentation  Atorvastatin 20 mg daily  Clonazepam 0 5mg daily HS  Flonase 50 mcg 2 sprays daily  Gabapentin 300 mg twice daily (recently decreased from t i d )  Loratadine 10 mg daily  Meloxicam 7 5mg daily  Omeprazole 40mg daily  Venlafaxine 150mg in am and 75mg evening    -recommend against use of Meloxicam in Geriatric population due to risk of renal impairment and GI distress/risk GI bleed  -although do not recommend use of Clonazepam in Geriatric population would not recommend abrupt dc during hospitalization and would recommend taper to off as o/p as long term goal    History of Present Illness   Physician Requesting Consult: Vena Blizzard, MD  Reason for Consult / Principal Problem: Left hip fracture  Hx and PE limited by: N/A  Additional history obtained from: Patient interview and extensive chart review    HPI: Gayathri Pham is a 75y o  year old female with multiple chronic medical co-morbidities who is admitted to the Orthopedic service following a fall on 2/29/20 while walking her daughters dog  She reports tripping and falling to the ground onto her left side resulting in immediate pain   She was unable to get off the ground despite assistance of multiple bystanders and explains that it took at least two men to lift her off the ground and to help her stand, she could not walk and was dragging her left leg  She is being seen in consultation by Geriatrics for ambulatory dysfunction with fall  She reports that she is having severe uncontrolled pain in her LLE which she reports as "20/10" and does not improve with any medications she has received so far  She tells me that she lives home alone and uses a cane for ambulation, she drives but does not have a car currently and has to get rides through her insurance company as her family does not have time to take her as her one daughter who resides close works two jobs and doesn't have time to help  Her other two daughters live in [place] and [place]  She reports being independent with ADLs and iADLs  She denies other recent falls, uses glasses and dentures, cane, does not use hearing aids  She explains that she does not want to go to [facility] at OR as she has a cat at home that she does not have anyone to take care of for her  Inpatient consult to Gerontology  Consult performed by: Em Looney DO  Consult ordered by: Melvi Segura MD        Review of Systems   Constitutional: Negative for activity change, appetite change, chills and fever  Fatigued all the time   HENT: Positive for dental problem (dentures)  Negative for hearing loss, trouble swallowing and voice change  Eyes: Positive for visual disturbance (glasses/one contact)  Respiratory: Negative for cough, choking, chest tightness, shortness of breath and wheezing  Cardiovascular: Negative for chest pain, palpitations and leg swelling  Gastrointestinal: Positive for constipation (no BM since admit)  Negative for diarrhea, nausea and vomiting  Endocrine: Negative  Genitourinary: Negative  Musculoskeletal: Positive for arthralgias, back pain and gait problem (uses cane for ambulation at baseline )  Skin: Negative  Allergic/Immunologic: Negative  Neurological: Positive for weakness (generalized)  Negative for dizziness, syncope, light-headedness, numbness and headaches  Hematological: Does not bruise/bleed easily  Psychiatric/Behavioral: Positive for decreased concentration, dysphoric mood and sleep disturbance (reports not sleeping since admission due to pain and discomfort, tells me she "watches the clock")  The patient is nervous/anxious  All other systems reviewed and are negative       Historical Information   Past Medical History:   Diagnosis Date    Anxiety     Depression     GERD (gastroesophageal reflux disease)     Panic attacks     Psychiatric disorder     anxiety, depression     Past Surgical History:   Procedure Laterality Date    BLADDER SUSPENSION      Mesh    CHOLECYSTECTOMY      COLONOSCOPY      GALLBLADDER SURGERY      NV LAP,CHOLECYSTECTOMY N/A 2016    Procedure: LAPAROSCOPIC CHOLECYSTECTOMY ;  Surgeon: Rafiq Vaughn DO;  Location: [facility];  Service: General  Last assessed: 16    TUBAL LIGATION      URETHRA SURGERY       Social History   Social History     Substance and Sexual Activity   Alcohol Use Never    Alcohol/week: 0 0 standard drinks    Frequency: Never    Drinks per session: Patient refused    Binge frequency: Never     Social History     Substance and Sexual Activity   Drug Use No     Social History     Tobacco Use   Smoking Status Former Smoker    Packs/day: 0 00    Years: 15 00    Pack years: 0 00    Types: Cigarettes    Last attempt to quit: 2019    Years since quittin 8   Smokeless Tobacco Never Used     Family History:   Family History   Problem Relation Age of Onset    Other Mother         Methicillin Resistant Staphylococcus Aureus Infection    Mental illness Mother     Alzheimer's disease Mother     Lung cancer Father     Alzheimer's disease Other      Meds/Allergies   all current active meds have been reviewed    Allergies   Allergen Reactions    Vancomycin Hives    Penicillins Hives and Rash     Objective     Intake/Output Summary (Last 24 hours) at 3/2/2020 1055  Last data filed at 3/2/2020 0900  Gross per 24 hour   Intake 2068 75 ml   Output 1125 ml   Net 943 75 ml     Invasive Devices     Peripheral Intravenous Line            Peripheral IV 02/29/20 Right Antecubital 1 day    Peripheral IV 03/01/20 Right Hand 1 day          Drain            Closed/Suction Drain Right RUQ Other (Comment) 8 5 Fr  1346 days              Physical Exam   Constitutional: She is oriented to person, place, and time  She appears well-developed and well-nourished  No distress  Appears stated age in no acute distress, appears comfortable at rest    HENT:   Head: Normocephalic and atraumatic  Mouth/Throat: Oropharynx is clear and moist    Dentures in place   Eyes: Conjunctivae and EOM are normal  No scleral icterus  Corrective lenses in place   Neck: Normal range of motion  Neck supple  No JVD present  No tracheal deviation present  Cardiovascular: Normal rate and intact distal pulses  No murmur heard  Pulmonary/Chest: Effort normal and breath sounds normal  No respiratory distress  She has no wheezes  She has no rales  Abdominal: Soft  She exhibits no distension  There is no tenderness  Musculoskeletal: She exhibits tenderness (LLE)  Normal muscle bulk and tone for age and activity level, LLE trace edema, tenderness to palpation   Neurological: She is alert and oriented to person, place, and time  Mini cog 5/5 at bedside today   Skin: Skin is warm and dry  She is not diaphoretic  No pallor  Psychiatric:   Slightly anxious and somewhat hyperactive    Nursing note and vitals reviewed      Lab Results:   I have personally reviewed pertinent lab results including the following:  -sodium 141, potassium 4 3, chloride 109, CO2 25, BUN 14, creatinine 1 19, glucose 92, calcium 8 9, GFR 48  -hemoglobin 11 9>> 9 1  -hemoglobin 9 1, hematocrit 29 9, WBC 6 41, platelets 421  -TSH 6 944    I have personally reviewed the following imaging study reports in PACS:  -XR hip/Pelvis on admission revealed nondisplaced left intertrochanteric femur fracture and moderate right hip OA  -XR femur left on admission revealed nondisplaced intertrochanteric left femur fracture  -portable chest x-ray 02/29/2020 revealed no acute cardiopulmonary disease  -MRI brain w/wo contrast 01/02/2018 revealed extensive right mastoid air cells opacification with postcontrast enhancement  Scattered mild chronic microangiopathic changes      Therapies:   PT: Following  OT: Following     VTE Prophylaxis: Enoxaparin (Lovenox)    Code Status: Level 1 - Full Code  Advance Directive and Living Will:      Power of :    POLST:      Family and Social Support: Daughter lives in [address] of Choice: Yes    Goals of Care: Go home to be with her cat

## 2020-03-02 NOTE — ASSESSMENT & PLAN NOTE
· S/p surgical intervention by Orthopaedics today, POD#1  · Plan as per primary  · Pain control per primary

## 2020-03-02 NOTE — ASSESSMENT & PLAN NOTE
· Acute blood loss anemia in the setting of recent surgery  11 2 on admission, down to 9 1 post op, 9 4 today, stable  · No indication for transfusion, will monitor

## 2020-03-02 NOTE — PROGRESS NOTES
Progress Note - Jamila Kimble 1955, 75 y o  female MRN: [id number]    Unit/Bed#: [facility] 607-01 Encounter: [id number]    Primary Care Provider: Sergey De Leon MD   Date and time admitted to hospital: 2020  6:24 PM        * Intertrochanteric fracture of femur ([address] )  Assessment & Plan  · S/p surgical intervention by Orthopaedics today, POD#1  · Plan as per primary  · Pain control per primary    Moderate major depression (HCC)  Assessment & Plan  · Continue Effexor     Anxiety  Assessment & Plan  · Continue Klonopin 0 5 mg QHS  · PDMP reviewed prior    Allergic rhinitis  Assessment & Plan  · Continue Flonase  Other hyperlipidemia  Assessment & Plan  · Continue statin      VTE Pharmacologic Prophylaxis:   Pharmacologic: Enoxaparin (Lovenox) per primary  Mechanical VTE Prophylaxis in Place: Yes    Patient Centered Rounds: I have performed bedside rounds with nursing staff today  Discussions with Specialists or Other Care Team Provider: nurse    Education and Discussions with Family / Patient: patient    Time Spent for Care: 20 minutes  More than 50% of total time spent on counseling and coordination of care as described above  Current Length of Stay: 2 day(s)    Current Patient Status: Inpatient   Certification Statement: per primary, Ortho  Discharge Plan: per primary, Ortho  SLIM will continue to follow    Code Status: Level 1 - Full Code      Subjective:   Ms Demario Fairbanks reports 10/10 right hip pain  Says she did not sleep well last night  Otherwise denies complaint    Objective:     Vitals:   Temp (24hrs), Av 7 °F (37 1 °C), Min:97 4 °F (36 3 °C), Max:100 1 °F (37 8 °C)    Temp:  [97 4 °F (36 3 °C)-100 1 °F (37 8 °C)] 99 2 °F (37 3 °C)  HR:  [84-96] 86  Resp:  [13-18] 18  BP: ()/(47-74) 117/69  SpO2:  [90 %-98 %] 92 %  Body mass index is 30 04 kg/m²  Input and Output Summary (last 24 hours):        Intake/Output Summary (Last 24 hours) at 3/2/2020 6076  Last data filed at 3/2/2020 0600  Gross per 24 hour   Intake 1828 75 ml   Output 1125 ml   Net 703 75 ml       Physical Exam:     Physical Exam   Constitutional: She is oriented to person, place, and time  Patient seen lying in bed  She appears uncomfortable when repositioning in bed   Cardiovascular: Normal rate and regular rhythm  Pulmonary/Chest: Effort normal and breath sounds normal  No respiratory distress  She has no wheezes  Abdominal: Soft  Bowel sounds are normal  There is no tenderness  Musculoskeletal: She exhibits no edema  Right hip dressings CDI  Palpable DP pulses   Neurological: She is alert and oriented to person, place, and time  Skin: Skin is warm  She is not diaphoretic  Psychiatric: She has a normal mood and affect  Her behavior is normal    Vitals reviewed  Additional Data:     Labs:    Results from last 7 days   Lab Units 03/02/20  0700 03/01/20  0500   WBC Thousand/uL 6 41 7 04   HEMOGLOBIN g/dL 9 1* 11 2*   HEMATOCRIT % 29 9* 36 1   PLATELETS Thousands/uL 254 350   NEUTROS PCT %  --  54   LYMPHS PCT %  --  36   MONOS PCT %  --  9   EOS PCT %  --  1     Results from last 7 days   Lab Units 03/01/20  0500   SODIUM mmol/L 141   POTASSIUM mmol/L 4 3   CHLORIDE mmol/L 109*   CO2 mmol/L 25   BUN mg/dL 14   CREATININE mg/dL 1 19   ANION GAP mmol/L 7   CALCIUM mg/dL 8 9   GLUCOSE RANDOM mg/dL 92     Results from last 7 days   Lab Units 02/29/20  2103   INR  1 03                       * I Have Reviewed All Lab Data Listed Above  * Additional Pertinent Lab Tests Reviewed:  All Labs Within Last 24 Hours Reviewed    Imaging:    Imaging Reports Reviewed Today Include: None  Imaging Personally Reviewed by Myself Includes:  none    Recent Cultures (last 7 days):           Last 24 Hours Medication List:     Current Facility-Administered Medications:  acetaminophen 975 mg Oral [facility] Tyler Dominguez MD    atorvastatin 20 mg Oral Daily Tyler Dominguez MD    cholecalciferol 1,000 Units Oral Daily Tyler Dominguez MD clonazePAM 0 5 mg Oral HS Taylor Echavarria MD    cyanocobalamin 1,000 mcg Oral Daily Taylor Echavarria MD    enoxaparin 40 mg Subcutaneous Q24H Ebony Guerra MD    fluticasone 2 spray Nasal Daily Taylor Echavarria MD    gabapentin 300 mg Oral BID Taylor Echavarria MD    HYDROmorphone 0 5 mg Intravenous Q4H PRN Taylor Echavarria MD    HYDROmorphone 1 mg Intravenous Once Taylor Echavarria MD    lactated ringers 75 mL/hr Intravenous Continuous Taylor Echavarria MD Last Rate: 75 mL/hr (03/02/20 0035)   loratadine 10 mg Oral Daily Taylor Echavarria MD    magnesium oxide 400 mg Oral Daily Taylor Echavarria MD    melatonin 3 mg Oral HS Taylor Echavarria MD    methocarbamol 750 mg Oral Q6H [address] Lindsay Styles MD    multivitamin-minerals 1 tablet Oral Daily Taylor Echavarria MD    oxyCODONE 10 mg Oral Q4H PRN Taylor Echavarria MD    oxyCODONE 5 mg Oral Q4H PRN Taylor Echavarria MD    pantoprazole 40 mg Oral Early Morning Taylor Echavarria MD    venlafaxine 150 mg Oral Daily Taylor Echavarria MD    venlafaxine 75 mg Oral QPM Taylor Echavarria MD         Today, Patient Was Seen By: Angeline Vaca PA-C    ** Please Note: Dictation voice to text software may have been used in the creation of this document   **

## 2020-03-02 NOTE — PLAN OF CARE
Problem: OCCUPATIONAL THERAPY ADULT  Goal: Performs self-care activities at highest level of function for planned discharge setting  See evaluation for individualized goals  Description  Treatment Interventions: ADL retraining, Functional transfer training, Endurance training, Patient/family training, Equipment evaluation/education, Compensatory technique education, Energy conservation, Activityengagement          See flowsheet documentation for full assessment, interventions and recommendations  Note:   Limitation: Decreased ADL status, Decreased endurance, Decreased self-care trans, Decreased high-level ADLs  Prognosis: Good  Assessment: 76 YO Female SEEN FOR INITIAL OCCUPATIONAL THERAPY EVALUATION FOLLOWING ADMISSION TO [facility] S/P FALL RESULTING IN L INTERTROCHANTERIC FEMUR FX  PT IS S/P ORIF OF L HIP WITH INTRAMEDULLARY NAIL ON 3/1/20  PT IS WBAT ON LLE  PROBLEMS LIST INCLUDES ANXIETY, DEPRESSION, AND PANIC ATTACKS  PT IS FROM HOME ALONE WHERE SHE REPORTS BEING INDEPENDENT WITH ADLS/IADLS/DRIVING PTA  PT CURRENTLY REQUIRES OVERALL MOD A  WITH ADLS, TRANSFERS AND LIMITED FUNCTIONAL MOBILITY WITH USE OF RW  PT IS LIMITED 2' PAIN, FATIGUE, IMPAIRED BALANCE, FALL RISK , ORTHOPEDIC RESTRICTIONS, DIZZINESS WITH CHANGE OF POSITIONING , LIMITED FAMILY/FRIEND SUPPORT , INACCESSIBLE HOME ENVIRONMENT and OVERALL LIMITED ACTIVITY TOLERANCE  PT EDUCATED ON DEEP BREATHING TECHNIQUES T/O ACTIVITY, SLOWING OF PACE, ENERGY CONSERVATION TECHNIQUES FOR CARRY OVER UPON D/C and CONTINUE PARTICIPATION IN SELF-CARE/MOBILITY WITH STAFF WHILE IN THE HOSPITAL   FROM AN OCCUPATIONAL THERAPY PERSPECTIVE, PT WOULD BENEFIT FROM ADDITIONAL OT SERVICES IN AN INPT REHAB SETTING UPON D/C  WILL CONT TO FOLLOW TO ADDRESS THE BELOW DESCRIBED GOALS  OT Discharge Recommendation: Short Term Rehab  OT - OK to Discharge:  Yes

## 2020-03-02 NOTE — PROGRESS NOTES
Subjective: No acute events overnight  No acute distress  Resting comfortably in bed    Objective:  A 10 point ROS was performed; negative except as noted above       Lab Results   Component Value Date/Time    WBC 7 04 03/01/2020 05:00 AM    HGB 11 2 (L) 03/01/2020 05:00 AM       Vitals:    03/02/20 0356   BP: 114/63   Pulse: 94   Resp: 18   Temp: 100 1 °F (37 8 °C)   SpO2: 90%     Left lower extremity  Dressing C/D/I  Thigh soft and compressible  Motor intact to EHL/FHL/TA/GS  Sensation intact to light touch to dp/sp/tib/anna marie/saph distributions  Toes warm and well perfused with brisk capillary refill    Assessment: 75 y o  female  POD 1 L TFN    Plan:  WBAT  left lower extremity   Pain control  DVT ppx: Enoxaparin (Lovenox)  PT/OT  Will continue to assess for acute blood loss anemia  Dispo: Ortho will follow

## 2020-03-02 NOTE — PHYSICAL THERAPY NOTE
PHYSICAL THERAPY EVALUATION  NAME:  Patricia Mcgill  DATE: 03/02/20    AGE:   75 y o  Mrn:   [id number]  ADMIT DX:  Hip injury [S79 919A]  Intertrochanteric fracture of femur ([facility] [address] ) [S72 143A]  Closed displaced basicervical fracture of left femur, initial encounter ([facility] ) Yvonne Lofton    Past Medical History:   Diagnosis Date    Anxiety     Depression     GERD (gastroesophageal reflux disease)     Panic attacks     Psychiatric disorder     anxiety, depression       Past Surgical History:   Procedure Laterality Date    BLADDER SUSPENSION  1980    Mesh    CHOLECYSTECTOMY      COLONOSCOPY      GALLBLADDER SURGERY      AL LAP,CHOLECYSTECTOMY N/A 7/11/2016    Procedure: LAPAROSCOPIC CHOLECYSTECTOMY ;  Surgeon: Telly Ramires DO;  Location: [facility];  Service: General  Last assessed: 5/24/16    AL OPEN RX FEMUR FX+INTRAMED NEMO Left 3/1/2020    Procedure: INSERTION NAIL IM FEMUR ANTEGRADE (TROCHANTERIC);   Surgeon: Leonor Joseph MD;  Location: [facility];  Service: Orthopedics    TUBAL LIGATION  1976    URETHRA SURGERY         Length Of Stay: 2    PHYSICAL THERAPY EVALUATION:        03/02/20 1033   Note Type   Note type Eval only   Pain Assessment   Pain Assessment 0-10   Pain Score Worst Possible Pain   Pain Type Acute pain   Pain Location Hip   Pain Orientation Right   Pain Descriptors Aching   Pain Frequency Constant/continuous   Pain Onset Ongoing   Clinical Progression Gradually improving   Effect of Pain on Daily Activities increased pain with activity    Patient's Stated Pain Goal No pain   Hospital Pain Intervention(s) Ambulation/increased activity;Repositioned   Response to Interventions tolerated    Home Living   Type of [address] level;Stairs to enter with rails  (first floor apartment, [address] )   Home Equipment Cane   Additional Comments Pt reports living alone and reports one local daughter who is only able to provide minimal assist    Prior Function   Level of [place] Tigist Doss is a 45 year old female here today for post op after injection on Right  L3-4, L-4-5 and L-5-S1 facet joint nerve block.  Immediate Relief 30%  2 Days After Procedure Relief 55%  7 Days After Procedure Relief 55%  Current Pain Relief 30%  Concerns none  Medication reviewed  Allergies reviewed  Tobacco and Alcohol verified  Latex Allergy none    Review of the system: Reviewed, denies any chest pain, denies any shortness of breath, denies any urinary or bowel incontinence, denies any weight loss.  Past family and social history reviewed: No Changes  Past Medical history:  Reviewed, No changes          Independent with ADLs and functional mobility   Lives With Alone   Receives Help From Family   ADL Assistance Independent   Falls in the last 6 months 1 to 4   Comments Pt reports the occasional use of a SPC for ambulation PTA    Restrictions/Precautions   Weight Bearing Precautions Per Order Yes   LLE Weight Bearing Per Order WBAT   Other Precautions Chair Alarm; Bed Alarm;Multiple lines; Fall Risk;Pain  (chair alarm on post session )   General   Family/Caregiver Present No   Cognition   Overall Cognitive Status WFL   Arousal/Participation Alert   Orientation Level Oriented to person;Oriented to place;Oriented to time   Memory Within functional limits   Following Commands Follows one step commands with increased time or repetition   RUE Assessment   RUE Assessment WFL   LUE Assessment   LUE Assessment WFL   RLE Assessment   RLE Assessment WFL   Strength RLE   RLE Overall Strength 4/5   LLE Assessment   LLE Assessment X  (decreased AROM 2* to pain )   Strength LLE   LLE Overall Strength 3+/5   Bed Mobility   Supine to Sit 3  Moderate assistance   Additional items Assist x 1; Increased time required;Verbal cues   Transfers   Sit to Stand 3  Moderate assistance   Additional items Assist x 1; Increased time required;Verbal cues   Stand to Sit 3  Moderate assistance   Additional items Assist x 1; Increased time required;Verbal cues   Additional Comments VC and TC needed for hand placement and safety    Ambulation/Elevation   Gait pattern Excessively slow; Short stride; Foward flexed; Inconsistent bola;Decreased foot clearance   Gait Assistance 3  Moderate assist   Additional items Assist x 1   Assistive Device Rolling walker   Distance 5ft   (limited due to pain and fatigue)   Balance   Static Sitting Fair -   Static Standing Poor +   Ambulatory Poor   Endurance Deficit   Endurance Deficit Yes   Endurance Deficit Description fatigue and pain    Activity Tolerance   Activity Tolerance Patient limited by fatigue;Patient limited by pain   Nurse Made Aware Pt appropriate to be seen and mobilize per nsg    Assessment   Prognosis Good   Problem List Decreased strength;Decreased range of motion;Decreased endurance; Impaired balance;Decreased mobility;Pain   Assessment Pt is 75 y o  female seen for PT evaluation s/p admit [facility] on 2/29/2020  Two pt identifiers were used to confirm  Pt presented s/p fall  Pt was admitted with a primary dx of: L intertrochanteric femur fx  Pt underwent INSERTION NAIL IM FEMUR ANTEGRADE (TROCHANTERIC) (Left),ORIF left hip with intramedullary nail which was performed on 3/1/2020  PT now consulted for assessment of mobility and d/c needs  Pt with Activity as tolerated orders  Pts current co morbidities effecting treatment include: anxiety, depression, GERD, panic attacks, and personal factors including ANALY home and living alone  Pts current clinical presentation is Unstable/ Unpredictable (high complexity) due to Ongoing medical management for primary dx, Increased reliance on more restrictive AD compared to baseline, Decreased activity tolerance compared to baseline, Fall risk, Increased assistance needed from caregiver at current time, Trending lab values, Continuous pulse oximetry monitoring     Prior to admission, pt was I with ambulation with the occasional use of a SPC  Upon evaluation, pt currently is requiring Mod Ax1 for bed mobility; Mod Ax1 for transfers and Mod Ax1 for ambulation w/ RW   Pt presents at PT eval functioning below baseline and currently w/ overall mobility deficits 2* to: BLE weakness, decreased ROM, impaired balance, decreased endurance, gait deviations, pain, decreased activity tolerance compared to baseline, fall risk, orthopedic restrictions  Pt currently at a fall risk 2* to impairments listed above    Based on the aforementioned PT evaluation, pt will continue to benefit from skilled Acute PT interventions to address stated impairments; to maximize functional mobility; for ongoing pt/ family training; and DME needs  At conclusion of PT session pt returned back in chair and chair alarm engaged with phone and call bell within reach  Pt denies any further questions at this time  PT is currently recommending Rehab  Pt/ family agreeable to plan and goals as stated on evaluation  PT will continue to follow during hospital stay  Barriers to Discharge Inaccessible home environment;Decreased caregiver support   Goals   Patient Goals " to have less pain"   [facility] Expiration Date 03/12/20   Short Term Goal #1 In 10 days pt will complete: 1) Bed mobility skills with S to increase safety and independence as well as decrease caregiver burden  2) Functional transfers with S to promote increased independence, safety, and QOL in the home environment  3) Ambulate 150' using least restrictive AD with S without LOB and stable vitals so that pt can negotiate home environment safely and promote independence with functional mobility and return to PLOF  4) Stair training up/ down 4 step/s using rail/s with S so that pt can enter/negotiate home environment safely and decrease fall risk  5) Improve balance grades to Good to increase safety with all mobility and decrease fall risk  6) Improve BLE strength by 1/2 grade to help increase overall functional mobility and decrease fall risk  7) PT for ongoing pt and family education; DME needs and D/C planning to promote highest level of function in least restrictive environment  Plan   Treatment/Interventions Functional transfer training;LE strengthening/ROM; Elevations; Therapeutic exercise; Endurance training;Patient/family training;Equipment eval/education; Bed mobility;Gait training;Spoke to nursing;OT   PT Frequency Other (Comment)  (3-6x a week )   Recommendation   Recommendation Short-term skilled PT   Equipment Recommended Walker  (RW)   PT - OK to Discharge Yes  (to rehab when medically cleared )   Modified Levar Scale   Modified Levar Scale 4   Barthel Index   Feeding 10   Bathing 0   Grooming Score 5   Dressing Score 5   Bladder Score 10   Bowels Score 10   Toilet Use Score 5   Transfers (Bed/Chair) Score 5   Mobility (Level Surface) Score 0   Stairs Score 0   Barthel Index Score 50   Roxanna Pacheco, PT

## 2020-03-02 NOTE — UTILIZATION REVIEW
Initial Clinical Review    Admission: Date/Time/Statement: Admission Orders (From admission, onward)     Ordered        02/29/20 2143  Inpatient Admission  Once                   Orders Placed This Encounter   Procedures    Inpatient Admission     Standing Status:   Standing     Number of Occurrences:   1     Order Specific Question:   Admitting Physician     Answer:   Charlette Cotto [[id number]]     Order Specific Question:   Level of Care     Answer:   Med Surg [[id number]]     Order Specific Question:   Estimated length of stay     Answer:   More than 2 Midnights     Order Specific Question:   Certification     Answer:   I certify that inpatient services are medically necessary for this patient for a duration of greater than two midnights  See H&P and MD Progress Notes for additional information about the patient's course of treatment  ED Arrival Information     Expected Arrival Acuity Means of Arrival Escorted By Service Admission Type    - 2/29/2020 18:24 Urgent Ambulance [address] [facility] Urgent    Arrival Complaint    Hip Pain        Chief Complaint   Patient presents with    Fall     Pt was walking the dog and the dog went the opposite direction and she fell onto her left hip  Pt c/o severe hip pain  Pt denies hitting head, no loc  Assessment/Plan:   71y Female to ED S/P Fall with left hip pain and unable to wt bear  Mayuri Eryn while ambulating her dog  PMH of Osteoarthritis, Gallbladder calculus in 2016, Depression, GERD, Anxiety and Hyperlipidemia  Admit Inpatient level of care for  S/p Fall with Left Intertrochanteric femur fracture and requiring operative fixation  Medicine consult for preop clearance  To OR for IM nail femur fracture of Intertrochanteric femur fracture  Analgesics for pain  NWB LLE  NPO at midnight    2/29 Internal Medicine cons; Mild risk for surgery  3/1 Progress notes; Plan for OR today for IMN  NPO   Pain control and NWB LLE     3/1 OR - S/P INSERTION NAIL IM FEMUR ANTEGRADE (TROCHANTERIC) (Left)  ORIF left hip with intramedullary nail    ED Triage Vitals [02/29/20 1830]   Temperature Pulse Respirations Blood Pressure SpO2   97 8 °F (36 6 °C) 104 20 (!) 171/73 100 %      Temp Source Heart Rate Source Patient Position - Orthostatic VS BP Location FiO2 (%)   Oral Monitor Lying Left arm --      Pain Score       Worst Possible Pain        Wt Readings from Last 1 Encounters:   03/02/20 76 9 kg (169 lb 9 6 oz)     Additional Vital Signs:   O2 4 N/C    03/01/20 22:50:42  99 1 °F (37 3 °C)  92  15  100/52  68  95 %       03/01/20 19:32:39  99 3 °F (37 4 °C)  89  16  102/52  69  92 %       03/01/20 1417  98 2 °F (36 8 °C)  85  16  106/57    98 %  Nasal cannula     03/01/20 1315  98 2 °F (36 8 °C)  91  17  110/53             03/01/20 1100  99 1 °F (37 3 °C)  89    99/47Abnormal     93 %  Nasal cannula     03/01/20 1045    86  13  87/53Abnormal              2/29/20 22:22:53  98 8 °F (37 1 °C)  91  16  119/60  80  95 %       02/29/20 2137    93  18  97/50    94 %  None (Room air)  Lying   02/29/20 1920    98  22  112/67    98 %  None (Room air)  Lying   02/29/20 1838              None (Room air)     02/29/20 1834  97 8 °F (36 6 °C)  104  20  171/73Abnormal              Pertinent Labs/Diagnostic Test Results:   2/29 Xray Left Femur - Nondisplaced intertrochanteric left femoral neck fracture  2/29 Xray Left Hip/Pelvis - Nondisplaced left intertrochanteric femoral neck fracture  Moderate right hip osteoarthritis      2/29 CXR - No acute cardiopulmonary disease      Results from last 7 days   Lab Units 03/02/20  0700 03/01/20  0500 02/29/20  2103   WBC Thousand/uL 6 41 7 04 14 14*   HEMOGLOBIN g/dL 9 1* 11 2* 11 9   HEMATOCRIT % 29 9* 36 1 37 6   PLATELETS Thousands/uL 254 350 354   NEUTROS ABS Thousands/µL  --  3 77  --          Results from last 7 days   Lab Units 03/01/20  0500 02/29/20  2103   SODIUM mmol/L 141 142   POTASSIUM mmol/L 4 3 4 4   CHLORIDE mmol/L 109* 109* CO2 mmol/L 25 24   ANION GAP mmol/L 7 9   BUN mg/dL 14 11   CREATININE mg/dL 1 19 0 92   EGFR ml/min/1 73sq m 48 65   CALCIUM mg/dL 8 9 8 8             Results from last 7 days   Lab Units 03/01/20  0500 02/29/20  2103   GLUCOSE RANDOM mg/dL 92 96     Results from last 7 days   Lab Units 02/29/20  2103   PROTIME seconds 13 1   INR  1 03   PTT seconds 28       ED Treatment:   Medication Administration from 02/29/2020 1824 to 02/29/2020 2210       Date/Time Order Dose Route Action     02/29/2020 1844 morphine (PF) 10 mg/mL injection 6 mg 6 mg Intravenous Given     02/29/2020 1918 HYDROmorphone (DILAUDID) injection 1 mg 1 mg Intravenous Given        Past Medical History:   Diagnosis Date    Anxiety     Depression     GERD (gastroesophageal reflux disease)     Panic attacks     Psychiatric disorder     anxiety, depression     Present on Admission:   Allergic rhinitis   Anxiety   Gastroesophageal reflux disease   Moderate major depression (HCC)   Osteoarthritis      Admitting Diagnosis: Hip injury [S79 279A]  Intertrochanteric fracture of femur ([facility] ) [S72 143A]  Closed displaced basicervical fracture of left femur, initial encounter ([facility] ) [S72 042A]  Age/Sex: 75 y o  female     Admission Orders:  3/1 OR - S/P INSERTION NAIL IM FEMUR ANTEGRADE (TROCHANTERIC) (Left)  ORIF left hip with intramedullary nail  Regular diet post op  Neurovascular checks q4h  IP CONSULT TO INTERNAL MEDICINE  IP CONSULT TO ORTHOPEDIC SURGERY  IP CONSULT TO GERONTOLOGY    Scheduled Medications:  Medications:  acetaminophen 975 mg Oral Q8H [address]   atorvastatin 20 mg Oral Daily   cholecalciferol 1,000 Units Oral Daily   clonazePAM 0 5 mg Oral HS   cyanocobalamin 1,000 mcg Oral Daily   enoxaparin 40 mg Subcutaneous Q24H SALLY   fluticasone 2 spray Nasal Daily   gabapentin 300 mg Oral BID   HYDROmorphone 1 mg Intravenous Once   loratadine 10 mg Oral Daily   magnesium oxide 400 mg Oral Daily   melatonin 3 mg Oral HS   methocarbamol 500 mg Oral Q6H [address]   multivitamin-minerals 1 tablet Oral Daily   pantoprazole 40 mg Oral Early Morning   venlafaxine 150 mg Oral Daily   venlafaxine 75 mg Oral QPM     Continuous IV Infusions:  lactated ringers 75 mL/hr Intravenous Continuous     PRN Meds:  HYDROmorphone 0 5 mg Intravenous Q4H PRN 3/1 x1   oxyCODONE 10 mg Oral Q4H PRN 2/29x1, 3/1 x4   oxyCODONE 5 mg Oral Q4H PRN     Network Utilization Review Department  [email or web address]  ATTENTION: Please call with any questions or concerns to [phone] and carefully listen to the prompts so that you are directed to the right person  All voicemails are confidential   Shelli Salinas all requests for admission clinical reviews, approved or denied determinations and any other requests to dedicated fax number below belonging to the campus where the patient is receiving treatment   List of dedicated fax numbers for the Facilities:  [address] [place] [address] DENIALS (Administrative/Medical Necessity) [phone]   [address] (Maternity/NICU/Pediatrics) [phone]   Shayy Rodriguez [phone]   [facility] [phone]   Roni Piña [phone]   [facility] [phone]   [address] [place] [address] [phone]   [facility]  [phone]   [address] [facility] [address] [facility]  [address] [address] [facility] [phone]

## 2020-03-02 NOTE — OCCUPATIONAL THERAPY NOTE
Occupational Therapy Evaluation     Patient Name: Avery ORTIZ Date: 3/2/2020  Problem List  Principal Problem:    Intertrochanteric fracture of femur ([facility] )  Active Problems:    Allergic rhinitis    Anxiety    Gastroesophageal reflux disease    Moderate major depression ([facility] )    Osteoarthritis    Closed displaced basicervical fracture of left femur (HCC)    Other hyperlipidemia    Preoperative examination    Anemia    Past Medical History  Past Medical History:   Diagnosis Date    Anxiety     Depression     GERD (gastroesophageal reflux disease)     Panic attacks     Psychiatric disorder     anxiety, depression     Past Surgical History  Past Surgical History:   Procedure Laterality Date    BLADDER SUSPENSION  1980    Mesh    CHOLECYSTECTOMY      COLONOSCOPY      GALLBLADDER SURGERY      NH LAP,CHOLECYSTECTOMY N/A 7/11/2016    Procedure: LAPAROSCOPIC CHOLECYSTECTOMY ;  Surgeon: Jessie Boyer DO;  Location: [facility];  Service: General  Last assessed: 5/24/16    NH OPEN RX FEMUR FX+INTRAMED NEMO Left 3/1/2020    Procedure: INSERTION NAIL IM FEMUR ANTEGRADE (TROCHANTERIC); Surgeon: Rene Isabel MD;  Location: [facility];  Service: [address] [place] [address]    URETHRA SURGERY           03/02/20 1032   Note Type   Note type Eval/Treat   Restrictions/Precautions   Weight Bearing Precautions Per Order Yes   LLE Weight Bearing Per Order WBAT   Other Precautions Cognitive; Chair Alarm;WBS;Multiple lines; Fall Risk;Pain   Pain Assessment   Pain Assessment 0-10   Pain Score Worst Possible Pain  ("20/10")   Pain Type Acute pain;Surgical pain   Pain Location Hip   Pain Orientation Left   Patient's Stated Pain Goal No pain   Hospital Pain Intervention(s) Repositioned; Ambulation/increased activity; Distraction; Emotional support   Response to Interventions TOLERATED    Home Living   Type of [address] One level;Stairs to enter with rails  (4 ANALY )   Bathroom Shower/Tub Tub/shower unit   Bathroom Toilet Standard   Bathroom Accessibility Accessible   Home Equipment Cane   Additional Comments PT REPORTS USE OF SPC PTA    Prior Function   Level of [place] Independent with ADLs and functional mobility   Lives With Alone   Receives Help From Family   ADL Assistance Independent   IADLs Independent   Falls in the last 6 months 1 to 4   Vocational Retired   Lifestyle   Autonomy  [facility] ADLS/IADLS/DRIVING PTA  Reciprocal Relationships LIVES ALONE  LIMITED SUPPORT PER PT   Service to Others RETIRED; WORKED AS A NURSING AIDE    Intrinsic Gratification ENJOYS SPENDING TIME WITH CAT    Psychosocial   Psychosocial (WDL) WDL   ADL   Eating Assistance 7  Independent   Grooming Assistance 5  Supervision/Setup   UB Bathing Assistance 5  Supervision/Setup   LB Bathing Assistance 3  Moderate Assistance   UB Dressing Assistance 5  Supervision/Setup   LB Dressing Assistance 3  Moderate Assistance   Toileting Assistance  3  Moderate Assistance   Functional Assistance 3  Moderate Assistance   Bed Mobility   Supine to Sit 3  Moderate assistance   Additional items Assist x 1; Increased time required;Verbal cues;LE management   Sit to Supine Unable to assess  (PT LEFT OOB WITH ALARM ON )   Transfers   Sit to Stand 3  Moderate assistance   Additional items Assist x 1; Increased time required;Verbal cues   Stand to Sit 3  Moderate assistance   Additional items Assist x 1; Increased time required;Verbal cues   Functional Mobility   Functional Mobility 3  Moderate assistance   Additional items Rolling walker   Balance   Static Sitting Fair -   Static Standing Poor +   Ambulatory Poor   Activity Tolerance   Activity Tolerance Patient limited by fatigue;Patient limited by pain   Nurse Made Aware APPROPRIATE TO SEE PER RN    RUE Assessment   RUE Assessment WFL   LUE Assessment   LUE Assessment WFL   Hand Function   Gross Motor Coordination Functional   Fine Motor Coordination Functional Sensation   Light Touch No apparent deficits   Cognition   Overall Cognitive Status WFL   Arousal/Participation Alert; Cooperative   Attention Within functional limits   Orientation Level Oriented X4   Memory Within functional limits   Following Commands Follows multistep commands without difficulty   Comments PT IS PLEASANT AND COOPERATIVE  Assessment   Limitation Decreased ADL status; Decreased endurance;Decreased self-care trans;Decreased high-level ADLs   Prognosis Good   Assessment 74 YO Female SEEN FOR INITIAL OCCUPATIONAL THERAPY EVALUATION FOLLOWING ADMISSION TO [facility] S/P FALL RESULTING IN L INTERTROCHANTERIC FEMUR FX  PT IS S/P ORIF OF L HIP WITH INTRAMEDULLARY NAIL ON 3/1/20  PT IS WBAT ON LLE  PROBLEMS LIST INCLUDES ANXIETY, DEPRESSION, AND PANIC ATTACKS  PT IS FROM HOME ALONE WHERE SHE REPORTS BEING INDEPENDENT WITH ADLS/IADLS/DRIVING PTA  PT CURRENTLY REQUIRES OVERALL MOD A  WITH ADLS, TRANSFERS AND LIMITED FUNCTIONAL MOBILITY WITH USE OF RW  PT IS LIMITED 2' PAIN, FATIGUE, IMPAIRED BALANCE, FALL RISK , ORTHOPEDIC RESTRICTIONS, DIZZINESS WITH CHANGE OF POSITIONING , LIMITED FAMILY/FRIEND SUPPORT , INACCESSIBLE HOME ENVIRONMENT and OVERALL LIMITED ACTIVITY TOLERANCE  PT EDUCATED ON DEEP BREATHING TECHNIQUES T/O ACTIVITY, SLOWING OF PACE, ENERGY CONSERVATION TECHNIQUES FOR CARRY OVER UPON D/C and CONTINUE PARTICIPATION IN SELF-CARE/MOBILITY WITH STAFF WHILE IN THE HOSPITAL   FROM AN OCCUPATIONAL THERAPY PERSPECTIVE, PT WOULD BENEFIT FROM ADDITIONAL OT SERVICES IN AN INPT REHAB SETTING UPON D/C  WILL CONT TO FOLLOW TO ADDRESS THE BELOW DESCRIBED GOALS  Goals   Patient Goals TO HAVE LESS PAIN    LTG Time Frame 10-14   Long Term Goal #1 SEE BELOW    Plan   Treatment Interventions ADL retraining;Functional transfer training; Endurance training;Patient/family training;Equipment evaluation/education; Compensatory technique education; Energy conservation; Activityengagement   Goal Expiration Date 03/16/20   OT Frequency 3-5x/wk   Recommendation   OT Discharge Recommendation Short Term Rehab   OT - OK to Discharge Yes   Modified Levar Scale   Modified Levar Scale 4       OCCUPATIONAL THERAPY GOALS TO BE MET WITHIN 10-14 DAYS:    -Pt will increase bed mobility to MOD I to participate in functional activities with G tolerance and balance  -Pt will improve functional mobility and transfers to MOD I on/off all surfaces w/ G balance/safety including toileting   -Pt will participate in lt grooming task with MOD I after set-up standing at sink ~3-5 minutes with G safety and balance  -Pt will increase independence in all ADLS to MOD I with G balance sitting upright in chair   -Pt will improve activity tolerance to G for 30 min txment sessions w/ G carry over of learned energy conservation techniques   -Pt will improve independence in lt homemaking activities to MOD I without requiring cues for safety   -Pt will demonstrate G carryover of learned safety techniques and proper body mechanics in functional and leisure activities with use of DME      Documentation completed by CLAY Gtz, OTR/L

## 2020-03-02 NOTE — PLAN OF CARE
Problem: Potential for Falls  Goal: Patient will remain free of falls  Description  INTERVENTIONS:  - Assess patient frequently for physical needs  -  Identify cognitive and physical deficits and behaviors that affect risk of falls    -  [place] fall precautions as indicated by assessment   - Educate patient/family on patient safety including physical limitations  - Instruct patient to call for assistance with activity based on assessment  - Modify environment to reduce risk of injury  - Consider OT/PT consult to assist with strengthening/mobility  Outcome: Progressing     Problem: PAIN - ADULT  Goal: Verbalizes/displays adequate comfort level or baseline comfort level  Description  Interventions:  - Encourage patient to monitor pain and request assistance  - Assess pain using appropriate pain scale  - Administer analgesics based on type and severity of pain and evaluate response  - Implement non-pharmacological measures as appropriate and evaluate response  - Consider cultural and social influences on pain and pain management  - Notify physician/advanced practitioner if interventions unsuccessful or patient reports new pain  Outcome: Progressing     Problem: INFECTION - ADULT  Goal: Absence or prevention of progression during hospitalization  Description  INTERVENTIONS:  - Assess and monitor for signs and symptoms of infection  - Monitor lab/diagnostic results  - Monitor all insertion sites, i e  indwelling lines, tubes, and drains  - Monitor endotracheal if appropriate and nasal secretions for changes in amount and color  - [place] appropriate cooling/warming therapies per order  - Administer medications as ordered  - Instruct and encourage patient and family to use good hand hygiene technique  - Identify and instruct in appropriate isolation precautions for identified infection/condition  Outcome: Progressing     Problem: SAFETY ADULT  Goal: Patient will remain free of falls  Description  INTERVENTIONS:  - Assess patient frequently for physical needs  -  Identify cognitive and physical deficits and behaviors that affect risk of falls    -  [address] fall precautions as indicated by assessment   - Educate patient/family on patient safety including physical limitations  - Instruct patient to call for assistance with activity based on assessment  - Modify environment to reduce risk of injury  - Consider OT/PT consult to assist with strengthening/mobility  Outcome: Progressing  Goal: Maintain or return to baseline ADL function  Description  INTERVENTIONS:  -  Assess patient's ability to carry out ADLs; assess patient's baseline for ADL function and identify physical deficits which impact ability to perform ADLs (bathing, care of mouth/teeth, toileting, grooming, dressing, etc )  - Assess/evaluate cause of self-care deficits   - Assess range of motion  - Assess patient's mobility; develop plan if impaired  - Assess patient's need for assistive devices and provide as appropriate  - Encourage maximum independence but intervene and supervise when necessary  - Involve family in performance of ADLs  - Assess for home care needs following discharge   - Consider OT consult to assist with ADL evaluation and planning for discharge  - Provide patient education as appropriate  Outcome: Progressing  Goal: Maintain or return mobility status to optimal level  Description  INTERVENTIONS:  - Assess patient's baseline mobility status (ambulation, transfers, stairs, etc )    - Identify cognitive and physical deficits and behaviors that affect mobility  - Identify mobility aids required to assist with transfers and/or ambulation (gait belt, sit-to-stand, lift, walker, cane, etc )  - [address] fall precautions as indicated by assessment  - Record patient progress and toleration of activity level on Mobility SBAR; progress patient to next Phase/Stage  - Instruct patient to call for assistance with activity based on assessment  - Consider rehabilitation consult to assist with strengthening/weightbearing, etc   Outcome: Progressing     Problem: DISCHARGE PLANNING  Goal: Discharge to home or other facility with appropriate resources  Description  INTERVENTIONS:  - Identify barriers to discharge w/patient and caregiver  - Arrange for needed discharge resources and transportation as appropriate  - Identify discharge learning needs (meds, wound care, etc )  - Arrange for interpretive services to assist at discharge as needed  - Refer to Case Management Department for coordinating discharge planning if the patient needs post-hospital services based on physician/advanced practitioner order or complex needs related to functional status, cognitive ability, or social support system  Outcome: Progressing     Problem: Knowledge Deficit  Goal: Patient/family/caregiver demonstrates understanding of disease process, treatment plan, medications, and discharge instructions  Description  Complete learning assessment and assess knowledge base    Interventions:  - Provide teaching at level of understanding  - Provide teaching via preferred learning methods  Outcome: Progressing     Problem: MUSCULOSKELETAL - ADULT  Goal: Maintain or return mobility to safest level of function  Description  INTERVENTIONS:  - Assess patient's ability to carry out ADLs; assess patient's baseline for ADL function and identify physical deficits which impact ability to perform ADLs (bathing, care of mouth/teeth, toileting, grooming, dressing, etc )  - Assess/evaluate cause of self-care deficits   - Assess range of motion  - Assess patient's mobility  - Assess patient's need for assistive devices and provide as appropriate  - Encourage maximum independence but intervene and supervise when necessary  - Involve family in performance of ADLs  - Assess for home care needs following discharge   - Consider OT consult to assist with ADL evaluation and planning for discharge  - Provide patient education as appropriate  Outcome: Progressing  Goal: Maintain proper alignment of affected body part  Description  INTERVENTIONS:  - Support, maintain and protect limb and body alignment  - Provide patient/ family with appropriate education  Outcome: Progressing     Problem: Prexisting or High Potential for Compromised Skin Integrity  Goal: Skin integrity is maintained or improved  Description  INTERVENTIONS:  - Identify patients at risk for skin breakdown  - Assess and monitor skin integrity  - Assess and monitor nutrition and hydration status  - Monitor labs   - Assess for incontinence   - Turn and reposition patient  - Assist with mobility/ambulation  - Relieve pressure over bony prominences  - Avoid friction and shearing  - Provide appropriate hygiene as needed including keeping skin clean and dry  - Evaluate need for skin moisturizer/barrier cream  - Collaborate with interdisciplinary team   - Patient/family teaching  - Consider wound care consult   Outcome: Progressing

## 2020-03-02 NOTE — PLAN OF CARE
Problem: PHYSICAL THERAPY ADULT  Goal: Performs mobility at highest level of function for planned discharge setting  See evaluation for individualized goals  Description  Treatment/Interventions: Functional transfer training, LE strengthening/ROM, Elevations, Therapeutic exercise, Endurance training, Patient/family training, Equipment eval/education, Bed mobility, Gait training, Spoke to nursing, OT  Equipment Recommended: Walker(RW)       See flowsheet documentation for full assessment, interventions and recommendations  Note:   Prognosis: Good  Problem List: Decreased strength, Decreased range of motion, Decreased endurance, Impaired balance, Decreased mobility, Pain  Assessment: Pt is 75 y o  female seen for PT evaluation s/p admit to [facility] on 2/29/2020  Two pt identifiers were used to confirm  Pt presented s/p fall  Pt was admitted with a primary dx of: L intertrochanteric femur fx  Pt underwent INSERTION NAIL IM FEMUR ANTEGRADE (TROCHANTERIC) (Left),ORIF left hip with intramedullary nail which was performed on 3/1/2020  PT now consulted for assessment of mobility and d/c needs  Pt with Activity as tolerated orders  Pts current co morbidities effecting treatment include: anxiety, depression, GERD, panic attacks, and personal factors including ANALY home and living alone  Pts current clinical presentation is Unstable/ Unpredictable (high complexity) due to Ongoing medical management for primary dx, Increased reliance on more restrictive AD compared to baseline, Decreased activity tolerance compared to baseline, Fall risk, Increased assistance needed from caregiver at current time, Trending lab values, Continuous pulse oximetry monitoring     Prior to admission, pt was I with ambulation with the occasional use of a SPC  Upon evaluation, pt currently is requiring Mod Ax1 for bed mobility; Mod Ax1 for transfers and Mod Ax1 for ambulation w/ RW    Pt presents at PT eval functioning below baseline and currently w/ overall mobility deficits 2* to: BLE weakness, decreased ROM, impaired balance, decreased endurance, gait deviations, pain, decreased activity tolerance compared to baseline, fall risk, orthopedic restrictions  Pt currently at a fall risk 2* to impairments listed above  Based on the aforementioned PT evaluation, pt will continue to benefit from skilled Acute PT interventions to address stated impairments; to maximize functional mobility; for ongoing pt/ family training; and DME needs  At conclusion of PT session pt returned back in chair and chair alarm engaged with phone and call bell within reach  Pt denies any further questions at this time  PT is currently recommending Rehab  Pt/ family agreeable to plan and goals as stated on evaluation  PT will continue to follow during hospital stay  Barriers to Discharge: Inaccessible home environment, Decreased caregiver support     Recommendation: Short-term skilled PT     PT - OK to Discharge: Yes(to rehab when medically cleared )    See flowsheet documentation for full assessment

## 2020-03-03 LAB
ANION GAP SERPL CALCULATED.3IONS-SCNC: 4 MMOL/L (ref 4–13)
BUN SERPL-MCNC: 12 MG/DL (ref 5–25)
CALCIUM SERPL-MCNC: 8.8 MG/DL (ref 8.3–10.1)
CHLORIDE SERPL-SCNC: 108 MMOL/L (ref 100–108)
CO2 SERPL-SCNC: 27 MMOL/L (ref 21–32)
CREAT SERPL-MCNC: 0.84 MG/DL (ref 0.6–1.3)
ERYTHROCYTE [DISTWIDTH] IN BLOOD BY AUTOMATED COUNT: 13.8 % (ref 11.6–15.1)
GFR SERPL CREATININE-BSD FRML MDRD: 73 ML/MIN/1.73SQ M
GLUCOSE SERPL-MCNC: 109 MG/DL (ref 65–140)
HCT VFR BLD AUTO: 30.7 % (ref 34.8–46.1)
HGB BLD-MCNC: 9.4 G/DL (ref 11.5–15.4)
MCH RBC QN AUTO: 28.7 PG (ref 26.8–34.3)
MCHC RBC AUTO-ENTMCNC: 30.6 G/DL (ref 31.4–37.4)
MCV RBC AUTO: 94 FL (ref 82–98)
PLATELET # BLD AUTO: 300 THOUSANDS/UL (ref 149–390)
PMV BLD AUTO: 10.1 FL (ref 8.9–12.7)
POTASSIUM SERPL-SCNC: 4.6 MMOL/L (ref 3.5–5.3)
RBC # BLD AUTO: 3.28 MILLION/UL (ref 3.81–5.12)
SODIUM SERPL-SCNC: 139 MMOL/L (ref 136–145)
WBC # BLD AUTO: 9.44 THOUSAND/UL (ref 4.31–10.16)

## 2020-03-03 PROCEDURE — 97110 THERAPEUTIC EXERCISES: CPT

## 2020-03-03 PROCEDURE — 97116 GAIT TRAINING THERAPY: CPT

## 2020-03-03 PROCEDURE — 85027 COMPLETE CBC AUTOMATED: CPT | Performed by: ORTHOPAEDIC SURGERY

## 2020-03-03 PROCEDURE — 99232 SBSQ HOSP IP/OBS MODERATE 35: CPT | Performed by: INTERNAL MEDICINE

## 2020-03-03 PROCEDURE — 80048 BASIC METABOLIC PNL TOTAL CA: CPT | Performed by: PHYSICIAN ASSISTANT

## 2020-03-03 PROCEDURE — NS001 PR NO SIGNATURE OR ATTESTATION: Performed by: ORTHOPAEDIC SURGERY

## 2020-03-03 PROCEDURE — 99231 SBSQ HOSP IP/OBS SF/LOW 25: CPT | Performed by: NURSE PRACTITIONER

## 2020-03-03 RX ORDER — OXYCODONE HYDROCHLORIDE 10 MG/1
10 TABLET ORAL EVERY 4 HOURS PRN
Status: DISCONTINUED | OUTPATIENT
Start: 2020-03-03 | End: 2020-03-05 | Stop reason: HOSPADM

## 2020-03-03 RX ORDER — OXYCODONE HYDROCHLORIDE 10 MG/1
10 TABLET ORAL EVERY 4 HOURS PRN
Status: DISCONTINUED | OUTPATIENT
Start: 2020-03-03 | End: 2020-03-03

## 2020-03-03 RX ORDER — POLYETHYLENE GLYCOL 3350 17 G/17G
17 POWDER, FOR SOLUTION ORAL DAILY
Status: DISCONTINUED | OUTPATIENT
Start: 2020-03-03 | End: 2020-03-05 | Stop reason: HOSPADM

## 2020-03-03 RX ORDER — OXYCODONE HYDROCHLORIDE 5 MG/1
5 TABLET ORAL EVERY 4 HOURS PRN
Status: DISCONTINUED | OUTPATIENT
Start: 2020-03-03 | End: 2020-03-05 | Stop reason: HOSPADM

## 2020-03-03 RX ADMIN — GABAPENTIN 300 MG: 300 CAPSULE ORAL at 17:02

## 2020-03-03 RX ADMIN — METHOCARBAMOL TABLETS 750 MG: 750 TABLET, COATED ORAL at 12:02

## 2020-03-03 RX ADMIN — POLYETHYLENE GLYCOL 3350 17 G: 17 POWDER, FOR SOLUTION ORAL at 09:39

## 2020-03-03 RX ADMIN — OXYCODONE HYDROCHLORIDE 10 MG: 10 TABLET ORAL at 15:46

## 2020-03-03 RX ADMIN — GABAPENTIN 300 MG: 300 CAPSULE ORAL at 09:39

## 2020-03-03 RX ADMIN — FLUTICASONE PROPIONATE 2 SPRAY: 50 SPRAY, METERED NASAL at 09:40

## 2020-03-03 RX ADMIN — METHOCARBAMOL TABLETS 750 MG: 750 TABLET, COATED ORAL at 05:18

## 2020-03-03 RX ADMIN — METHOCARBAMOL TABLETS 750 MG: 750 TABLET, COATED ORAL at 17:02

## 2020-03-03 RX ADMIN — OXYCODONE HYDROCHLORIDE 10 MG: 10 TABLET ORAL at 19:55

## 2020-03-03 RX ADMIN — METHOCARBAMOL TABLETS 750 MG: 750 TABLET, COATED ORAL at 23:38

## 2020-03-03 RX ADMIN — CLONAZEPAM 0.5 MG: 0.5 TABLET ORAL at 01:10

## 2020-03-03 RX ADMIN — ACETAMINOPHEN 975 MG: 325 TABLET ORAL at 01:10

## 2020-03-03 RX ADMIN — CYANOCOBALAMIN TAB 500 MCG 1000 MCG: 500 TAB at 09:38

## 2020-03-03 RX ADMIN — MELATONIN 3 MG: 3 TAB ORAL at 01:11

## 2020-03-03 RX ADMIN — VENLAFAXINE HYDROCHLORIDE 75 MG: 75 CAPSULE, EXTENDED RELEASE ORAL at 17:02

## 2020-03-03 RX ADMIN — OXYCODONE HYDROCHLORIDE 10 MG: 10 TABLET ORAL at 05:18

## 2020-03-03 RX ADMIN — MELATONIN 3 MG: 3 TAB ORAL at 21:23

## 2020-03-03 RX ADMIN — METHOCARBAMOL TABLETS 750 MG: 750 TABLET, COATED ORAL at 01:11

## 2020-03-03 RX ADMIN — LORATADINE 10 MG: 10 TABLET ORAL at 09:39

## 2020-03-03 RX ADMIN — ACETAMINOPHEN 975 MG: 325 TABLET ORAL at 14:45

## 2020-03-03 RX ADMIN — ATORVASTATIN CALCIUM 20 MG: 20 TABLET, FILM COATED ORAL at 09:38

## 2020-03-03 RX ADMIN — VENLAFAXINE HYDROCHLORIDE 150 MG: 150 CAPSULE, EXTENDED RELEASE ORAL at 09:38

## 2020-03-03 RX ADMIN — MAGNESIUM OXIDE TAB 400 MG (240 MG ELEMENTAL MG) 400 MG: 400 (240 MG) TAB at 09:39

## 2020-03-03 RX ADMIN — CLONAZEPAM 0.5 MG: 0.5 TABLET ORAL at 21:23

## 2020-03-03 RX ADMIN — ACETAMINOPHEN 975 MG: 325 TABLET ORAL at 05:18

## 2020-03-03 RX ADMIN — OXYCODONE HYDROCHLORIDE 10 MG: 10 TABLET ORAL at 01:14

## 2020-03-03 RX ADMIN — ACETAMINOPHEN 975 MG: 325 TABLET ORAL at 21:23

## 2020-03-03 RX ADMIN — MELATONIN 1000 UNITS: at 09:38

## 2020-03-03 RX ADMIN — OXYCODONE HYDROCHLORIDE 10 MG: 10 TABLET ORAL at 09:39

## 2020-03-03 RX ADMIN — PANTOPRAZOLE SODIUM 40 MG: 40 TABLET, DELAYED RELEASE ORAL at 05:18

## 2020-03-03 RX ADMIN — ENOXAPARIN SODIUM 40 MG: 40 INJECTION SUBCUTANEOUS at 21:23

## 2020-03-03 RX ADMIN — Medication 1 TABLET: at 09:39

## 2020-03-03 NOTE — PLAN OF CARE
Problem: Potential for Falls  Goal: Patient will remain free of falls  Description  INTERVENTIONS:  - Assess patient frequently for physical needs  -  Identify cognitive and physical deficits and behaviors that affect risk of falls    -  [place] fall precautions as indicated by assessment   - Educate patient/family on patient safety including physical limitations  - Instruct patient to call for assistance with activity based on assessment  - Modify environment to reduce risk of injury  - Consider OT/PT consult to assist with strengthening/mobility  Outcome: Progressing     Problem: PAIN - ADULT  Goal: Verbalizes/displays adequate comfort level or baseline comfort level  Description  Interventions:  - Encourage patient to monitor pain and request assistance  - Assess pain using appropriate pain scale  - Administer analgesics based on type and severity of pain and evaluate response  - Implement non-pharmacological measures as appropriate and evaluate response  - Consider cultural and social influences on pain and pain management  - Notify physician/advanced practitioner if interventions unsuccessful or patient reports new pain  Outcome: Progressing     Problem: INFECTION - ADULT  Goal: Absence or prevention of progression during hospitalization  Description  INTERVENTIONS:  - Assess and monitor for signs and symptoms of infection  - Monitor lab/diagnostic results  - Monitor all insertion sites, i e  indwelling lines, tubes, and drains  - Monitor endotracheal if appropriate and nasal secretions for changes in amount and color  - [place] appropriate cooling/warming therapies per order  - Administer medications as ordered  - Instruct and encourage patient and family to use good hand hygiene technique  - Identify and instruct in appropriate isolation precautions for identified infection/condition  Outcome: Progressing     Problem: SAFETY ADULT  Goal: Patient will remain free of falls  Description  INTERVENTIONS:  - Assess patient frequently for physical needs  -  Identify cognitive and physical deficits and behaviors that affect risk of falls    -  [place] fall precautions as indicated by assessment   - Educate patient/family on patient safety including physical limitations  - Instruct patient to call for assistance with activity based on assessment  - Modify environment to reduce risk of injury  - Consider OT/PT consult to assist with strengthening/mobility  Outcome: Progressing  Goal: Maintain or return to baseline ADL function  Description  INTERVENTIONS:  -  Assess patient's ability to carry out ADLs; assess patient's baseline for ADL function and identify physical deficits which impact ability to perform ADLs (bathing, care of mouth/teeth, toileting, grooming, dressing, etc )  - Assess/evaluate cause of self-care deficits   - Assess range of motion  - Assess patient's mobility; develop plan if impaired  - Assess patient's need for assistive devices and provide as appropriate  - Encourage maximum independence but intervene and supervise when necessary  - Involve family in performance of ADLs  - Assess for home care needs following discharge   - Consider OT consult to assist with ADL evaluation and planning for discharge  - Provide patient education as appropriate  Outcome: Progressing  Goal: Maintain or return mobility status to optimal level  Description  INTERVENTIONS:  - Assess patient's baseline mobility status (ambulation, transfers, stairs, etc )    - Identify cognitive and physical deficits and behaviors that affect mobility  - Identify mobility aids required to assist with transfers and/or ambulation (gait belt, sit-to-stand, lift, walker, cane, etc )  - [place] fall precautions as indicated by assessment  - Record patient progress and toleration of activity level on Mobility SBAR; progress patient to next Phase/Stage  - Instruct patient to call for assistance with activity based on assessment  - Consider rehabilitation consult to assist with strengthening/weightbearing, etc   Outcome: Progressing     Problem: DISCHARGE PLANNING  Goal: Discharge to home or other facility with appropriate resources  Description  INTERVENTIONS:  - Identify barriers to discharge w/patient and caregiver  - Arrange for needed discharge resources and transportation as appropriate  - Identify discharge learning needs (meds, wound care, etc )  - Arrange for interpretive services to assist at discharge as needed  - Refer to Case Management Department for coordinating discharge planning if the patient needs post-hospital services based on physician/advanced practitioner order or complex needs related to functional status, cognitive ability, or social support system  Outcome: Progressing     Problem: Knowledge Deficit  Goal: Patient/family/caregiver demonstrates understanding of disease process, treatment plan, medications, and discharge instructions  Description  Complete learning assessment and assess knowledge base    Interventions:  - Provide teaching at level of understanding  - Provide teaching via preferred learning methods  Outcome: Progressing     Problem: MUSCULOSKELETAL - ADULT  Goal: Maintain or return mobility to safest level of function  Description  INTERVENTIONS:  - Assess patient's ability to carry out ADLs; assess patient's baseline for ADL function and identify physical deficits which impact ability to perform ADLs (bathing, care of mouth/teeth, toileting, grooming, dressing, etc )  - Assess/evaluate cause of self-care deficits   - Assess range of motion  - Assess patient's mobility  - Assess patient's need for assistive devices and provide as appropriate  - Encourage maximum independence but intervene and supervise when necessary  - Involve family in performance of ADLs  - Assess for home care needs following discharge   - Consider OT consult to assist with ADL evaluation and planning for discharge  - Provide patient education as appropriate  Outcome: Progressing  Goal: Maintain proper alignment of affected body part  Description  INTERVENTIONS:  - Support, maintain and protect limb and body alignment  - Provide patient/ family with appropriate education  Outcome: Progressing     Problem: Prexisting or High Potential for Compromised Skin Integrity  Goal: Skin integrity is maintained or improved  Description  INTERVENTIONS:  - Identify patients at risk for skin breakdown  - Assess and monitor skin integrity  - Assess and monitor nutrition and hydration status  - Monitor labs   - Assess for incontinence   - Turn and reposition patient  - Assist with mobility/ambulation  - Relieve pressure over bony prominences  - Avoid friction and shearing  - Provide appropriate hygiene as needed including keeping skin clean and dry  - Evaluate need for skin moisturizer/barrier cream  - Collaborate with interdisciplinary team   - Patient/family teaching  - Consider wound care consult   Outcome: Progressing

## 2020-03-03 NOTE — PHYSICAL THERAPY NOTE
PHYSICAL THERAPY EVALUATION  NAME:  Johanna Gautam  DATE: 03/03/20    AGE:   75 y o  Mrn:   [id number]  ADMIT DX:  Hip injury [S79 259A]  Intertrochanteric fracture of femur ([address] ) [S72 143A]  Closed displaced basicervical fracture of left femur, [facility] Dina Sweeney    Past Medical History:   Diagnosis Date    Anxiety     Depression     GERD (gastroesophageal reflux disease)     Panic attacks     Psychiatric disorder     anxiety, depression       Past Surgical History:   Procedure Laterality Date    BLADDER SUSPENSION  1980    Mesh    CHOLECYSTECTOMY      COLONOSCOPY      GALLBLADDER SURGERY      [facility] LAP,CHOLECYSTECTOMY N/A 7/11/2016    Procedure: LAPAROSCOPIC CHOLECYSTECTOMY ;  Surgeon: Al Massey DO;  Location: [facility];  Service: General  Last assessed: 5/24/16    [facility] OPEN RX FEMUR FX+INTRAMED NEMO Left 3/1/2020    Procedure: INSERTION NAIL IM FEMUR ANTEGRADE (TROCHANTERIC); Surgeon: Ju Marquez MD;  Location: [facility];  Service: Orthopedics    TUBAL LIGATION  1976    URETHRA SURGERY         Length Of Stay: 3    PHYSICAL THERAPY EVALUATION:        03/03/20 1540   Pain Assessment   Pain Assessment 0-10   Pain Score 9   Pain Type Acute pain   Pain Location Hip   Pain Orientation Right   Pain Descriptors Aching   Pain Frequency Constant/continuous   Pain Onset Ongoing   Clinical Progression Gradually improving   Effect of Pain on Daily Activities increased pain with activity    Patient's Stated Pain Goal No pain   Hospital Pain Intervention(s) Ambulation/increased activity;Repositioned   Response to Interventions tolerated    Restrictions/Precautions   Weight Bearing Precautions Per Order Yes   LLE Weight Bearing Per Order WBAT   Other Precautions Chair Alarm; Bed Alarm;Multiple lines; Fall Risk;Pain  (chair alarm on post session )   General   Chart Reviewed Yes   Response to Previous Treatment Patient with no complaints from previous session     Family/Caregiver Present No Cognition   Overall Cognitive Status WFL   Arousal/Participation Alert   Attention Within functional limits   Orientation Level Oriented to person;Oriented to place;Oriented to time   Memory Within functional limits   Following Commands Follows one step commands without difficulty   Subjective   Subjective Pt willing to participate in PT treatment session    Bed Mobility   Supine to Sit 3  Moderate assistance   Additional items Assist x 1; Increased time required;Verbal cues   Transfers   Sit to Stand 4  Minimal assistance   Additional items Assist x 1; Increased time required;Verbal cues   Stand to Sit 4  Minimal assistance   Additional items Assist x 1; Increased time required;Verbal cues   Additional Comments VC and TC needed for hand placement and safety    Ambulation/Elevation   Gait pattern Excessively slow; Short stride; Foward flexed; Inconsistent bola   Gait Assistance 3  Moderate assist   Additional items Assist x 1   Assistive Device Rolling walker   Distance 15ft   (limited by fatigue and pain )   Balance   Static Sitting Fair   Static Standing Poor +   Ambulatory Poor   Endurance Deficit   Endurance Deficit Yes   Endurance Deficit Description fatigue and pain    Activity Tolerance   Activity Tolerance Patient limited by fatigue;Patient limited by pain   Nurse Made Aware Pt appropriate to be seen and mobilize per nsg    Exercises   Hip Abduction Sitting;10 reps;AROM; Left   Knee AROM Long Arc Quad Sitting;10 reps;AROM; Left  (x 2 sets )   Ankle Pumps Sitting;15 reps;AROM; Bilateral  (x 2 sets )   Marching Sitting;10 reps;AAROM; Left  (x 2 sets )   Assessment   Prognosis Good   Problem List Decreased strength;Decreased range of motion;Decreased endurance; Impaired balance;Decreased mobility;Pain   Assessment Pt resting in bed at time of PT treatment session  Pt reports feeling " a little better" this session and is willing to participate in PT treatment   Pt able to perform all bed mobility this session with mod A x 1 and all transfers with min A x 1 which is slightly improved compared to previous session  Increased time required as well as VC and TC for hand placement and safety  In standing pt noted to have forward flexed posture and required VC as well as min A x 1 to correct  Pt able to tolerate increased ambulation distances this session with mod A x 1 and the use of a RW which is slightly improved compared to previous session, although distances continue to be limited 2* to pain and fatigue  Chair follow required for safety  Pt noted to have step to gait pattern with decreased step length, decreased gait speed, and decreased foot clearance on R  Pt able to tolerate all therex seated OOB in chair this session without any increase in complaints  Cues as well as physical assistance required to complete  At conclusion of PT session pt assisted back into chair with all needs within reach and chair alarm on  PT will continue to follow  D/C recommendation when medically cleared is rehab  Barriers to Discharge Inaccessible home environment;Decreased caregiver support   Goals   Patient Goals " to get up and walk"   STG Expiration Date 03/12/20   PT Treatment Day 1   Plan   Treatment/Interventions Functional transfer training;LE strengthening/ROM; Therapeutic exercise; Endurance training;Patient/family training;Equipment eval/education; Bed mobility;Gait training;Spoke to nursing   Progress Progressing toward goals   PT Frequency Other (Comment)  (3-6x a week )   Recommendation   Recommendation Short-term skilled PT   Equipment Recommended Walker  (RW)   PT - OK to Discharge Yes  (to rehab when medically cleared)   Gerard Fuentes, PT

## 2020-03-03 NOTE — ASSESSMENT & PLAN NOTE
· Patient presented status post fall with left hip pain and inability to ambulate  · Found to have left femur fracture status post left TFN, POD# 2    · DVT ppx as per primary team  · Pain control per primary  · PT/OT recommending rehab, CM following  · Medically stable for d/c from an internal medicine standpoint  We will continue to follow peripherally

## 2020-03-03 NOTE — PLAN OF CARE
Problem: PHYSICAL THERAPY ADULT  Goal: Performs mobility at highest level of function for planned discharge setting  See evaluation for individualized goals  Description  Treatment/Interventions: Functional transfer training, LE strengthening/ROM, Elevations, Therapeutic exercise, Endurance training, Patient/family training, Equipment eval/education, Bed mobility, Gait training, Spoke to nursing, OT  Equipment Recommended: Walker(RW)       See flowsheet documentation for full assessment, interventions and recommendations  Outcome: Progressing  Note:   Prognosis: Good  Problem List: Decreased strength, Decreased range of motion, Decreased endurance, Impaired balance, Decreased mobility, Pain  Assessment: Pt resting in bed at time of PT treatment session  Pt reports feeling " a little better" this session and is willing to participate in PT treatment  Pt able to perform all bed mobility this session with mod A x 1 and all transfers with min A x 1 which is slightly improved compared to previous session  Increased time required as well as VC and TC for hand placement and safety  In standing pt noted to have forward flexed posture and required VC as well as min A x 1 to correct  Pt able to tolerate increased ambulation distances this session with mod A x 1 and the use of a RW which is slightly improved compared to previous session, although distances continue to be limited 2* to pain and fatigue  Chair follow required for safety  Pt noted to have step to gait pattern with decreased step length, decreased gait speed, and decreased foot clearance on R  Pt able to tolerate all therex seated OOB in chair this session without any increase in complaints  Cues as well as physical assistance required to complete  At conclusion of PT session pt assisted back into chair with all needs within reach and chair alarm on  PT will continue to follow  D/C recommendation when medically cleared is rehab      Barriers to Discharge: Inaccessible home environment, Decreased caregiver support     Recommendation: Short-term skilled PT     PT - OK to Discharge: Yes(to rehab when medically cleared)    See flowsheet documentation for full assessment

## 2020-03-03 NOTE — PROGRESS NOTES
Subjective: No acute events overnight  No acute distress  Had some vomiting overnight  Pain is main complaint    Objective:  A 10 point ROS was performed; negative except as noted above  Lab Results   Component Value Date/Time    WBC 9 44 03/03/2020 04:41 AM    HGB 9 4 (L) 03/03/2020 04:41 AM       Vitals:    03/02/20 2250   BP: 125/69   Pulse: 95   Resp: 18   Temp: 99 3 °F (37 4 °C)   SpO2: 91%     Left lower extremity  Dressing C/D/I  Thigh soft and compressible  Motor intact to EHL/FHL/TA/GS  Sensation intact to light touch to dp/sp/tib/anna marie/saph distributions  Toes warm and well perfused with brisk capillary refill    Assessment: 75 y o  female POD 3 L TFN      Plan:  WBAT  left lower extremity   Pain control-Will adjust medications today  DVT ppx: Enoxaparin (Lovenox)  PT/OT  Will continue to assess for acute blood loss anemia  Dispo: Ortho will follow

## 2020-03-03 NOTE — PROGRESS NOTES
[address] Internal Medicine    Progress Note - Jackson Carcamo 1955, 75 y o  female MRN: [id number]    Unit/Bed#: [facility] 607-01 Encounter: [id number]    Primary Care Provider: Cristy Juan MD   Date and time admitted to hospital: 2/29/2020  6:24 PM        * Intertrochanteric fracture of femur [facility])  Assessment & Plan  · Patient presented status post fall with left hip pain and inability to ambulate  · Found to have left femur fracture status post left TFN, POD# 2    · DVT ppx as per primary team  · Pain control per primary  · PT/OT recommending rehab, CM following  · Medically stable for d/c from an internal medicine standpoint  We will continue to follow peripherally  Anemia  Assessment & Plan  · Acute blood loss anemia in the setting of recent surgery  11 2 on admission, down to 9 1 post op, 9 4 today, stable  · No indication for transfusion, will monitor  Allergic rhinitis  Assessment & Plan  · Continue Flonase  Anxiety  Assessment & Plan  · Continue Klonopin 0 5 mg QHS  · PDMP reviewed by prior provider     Gastroesophageal reflux disease  Assessment & Plan  Continue pantoprazole/omeprazole  Moderate major depression (HCC)  Assessment & Plan  · Continue Effexor     Other hyperlipidemia  Assessment & Plan  · Continue statin    Pharmacologic: Enoxaparin (Lovenox)  Mechanical VTE Prophylaxis in Place: Yes    Patient Centered Rounds: I have performed bedside rounds with nursing staff today  Anne Tolentino     Discussions with Specialists or Other Care Team Provider: nursing, case management     Education and Discussions with Family / Patient: patient     Time Spent for Care: 30 minutes  More than 50% of total time spent on counseling and coordination of care as described above      Current Length of Stay: 3 day(s)    Current Patient Status: Inpatient   Certification Statement: The patient will continue to require additional inpatient hospital stay due to plan as per primary team    Discharge Plan / Estimated Discharge Date: pending discharge plan or as per primary team        Code Status: Level 1 - Full Code      Subjective:   Pain somewhat controlled today  Had some nausea with 1 episode of vomiting overnight however nothing further  No abdominal pain  No BM since surgery  She is concerned about discharge to rehab as she has a cat at home  Objective:     Vitals:   Temp (24hrs), Av °F (37 2 °C), Min:98 4 °F (36 9 °C), Max:99 4 °F (37 4 °C)    Temp:  [98 4 °F (36 9 °C)-99 4 °F (37 4 °C)] 99 4 °F (37 4 °C)  HR:  [] 100  Resp:  [16-18] 16  BP: (109-128)/(53-69) 122/69  SpO2:  [90 %-94 %] 94 %  Body mass index is 30 04 kg/m²  Input and Output Summary (last 24 hours): Intake/Output Summary (Last 24 hours) at 3/3/2020 9203  Last data filed at 3/3/2020 0122  Gross per 24 hour   Intake 1310 ml   Output 550 ml   Net 760 ml       Physical Exam:     Physical Exam   Constitutional: She is oriented to person, place, and time  No distress  HENT:   Head: Normocephalic  Eyes: Conjunctivae are normal    Neck: Normal range of motion  Cardiovascular: Normal rate  Pulmonary/Chest: Breath sounds normal    Abdominal: Bowel sounds are normal    Musculoskeletal: Normal range of motion  She exhibits no edema  Neurological: She is alert and oriented to person, place, and time  Skin: Skin is warm  Left lower extremity dressings clean dry and intact   Psychiatric: She has a normal mood and affect  Nursing note and vitals reviewed  Additional Data:     Labs:    Results from last 7 days   Lab Units 20  0441  20  0500   WBC Thousand/uL 9 44   < > 7 04   HEMOGLOBIN g/dL 9 4*   < > 11 2*   HEMATOCRIT % 30 7*   < > 36 1   PLATELETS Thousands/uL 300   < > 350   NEUTROS PCT %  --   --  54   LYMPHS PCT %  --   --  36   MONOS PCT %  --   --  9   EOS PCT %  --   --  1    < > = values in this interval not displayed       Results from last 7 days   Lab Units 03/03/20  0441   POTASSIUM mmol/L 4 6   CHLORIDE mmol/L 108   CO2 mmol/L 27   BUN mg/dL 12   CREATININE mg/dL 0 84   CALCIUM mg/dL 8 8     Results from last 7 days   Lab Units 02/29/20  2103   INR  1 03         Recent Cultures (last 7 days):           Last 24 Hours Medication List:     Current Facility-Administered Medications:  acetaminophen 975 mg Oral [facility] Chela Denney MD   atorvastatin 20 mg Oral Daily Chela Denney MD   cholecalciferol 1,000 Units Oral Daily Chela Denney MD   clonazePAM 0 5 mg Oral HS Chela Denney MD   cyanocobalamin 1,000 mcg Oral Daily Chela Denney MD   enoxaparin 40 mg Subcutaneous Q24H Bird Joseph MD   fluticasone 2 spray Nasal Daily Chela Denney MD   gabapentin 300 mg Oral BID Chela Denney MD   HYDROmorphone 0 5 mg Intravenous Q4H PRN hCela Denney MD   HYDROmorphone 1 mg Intravenous Once Chela Denney MD   loratadine 10 mg Oral Daily Chela Denney MD   magnesium oxide 400 mg Oral Daily Chela Denney MD   melatonin 3 mg Oral HS Chela Denney MD   methocarbamol 750 mg Oral Q6H [address] Diannia Bence, MD   multivitamin-minerals 1 tablet Oral Daily Chela Denney MD   oxyCODONE 10 mg Oral Q4H PRN Chela Denney MD   oxyCODONE 5 mg Oral Q4H PRN Chela Denney MD   pantoprazole 40 mg Oral Early Morning Chela Denney MD   venlafaxine 150 mg Oral Daily Chela Denney MD   venlafaxine 75 mg Oral QPM Chela Denney MD        Today, Patient Was Seen By: ROBERTA Lopez    ** Please Note: Dragon 360 Dictation voice to text software may have been used in the creation of this document   **

## 2020-03-03 NOTE — SOCIAL WORK
Patient reviewed with provider  Patient needs rehab  Acute and SNF rehab lists provided to patient and thoroughly explained  Patient reluctant to rehab, states she has a cat  Patient states there is no one to take care of her cat  CM explained to patient there is no one to help take care of her at home  Patient requested private pay home health aid list  Patient wants to call and inquire about home health aid  Patient states she needs to discuss rehab options with her daughters  Patient has 3 daughters  One dgt lives in [place], one in [place] and a dgt locally that works 2 jobs  Patient requested a day to consider her options  CM explained medical clearance and advised CM will come back later today for choices  Update 1429: MICHAEL returned to speak w/ patient regarding discharge planning rehab vs home w/ HHA  Patient states she hasn't followed up with anyone, she fell asleep  CM asked for permission to discuss options with her dgts  Patient requested I call Magdalena Whitaker CM left voicemail for Magdalena Whitaker

## 2020-03-04 LAB
ERYTHROCYTE [DISTWIDTH] IN BLOOD BY AUTOMATED COUNT: 14.1 % (ref 11.6–15.1)
HCT VFR BLD AUTO: 27.6 % (ref 34.8–46.1)
HGB BLD-MCNC: 9 G/DL (ref 11.5–15.4)
MCH RBC QN AUTO: 29.7 PG (ref 26.8–34.3)
MCHC RBC AUTO-ENTMCNC: 32.6 G/DL (ref 31.4–37.4)
MCV RBC AUTO: 91 FL (ref 82–98)
PLATELET # BLD AUTO: 303 THOUSANDS/UL (ref 149–390)
PMV BLD AUTO: 10.5 FL (ref 8.9–12.7)
RBC # BLD AUTO: 3.03 MILLION/UL (ref 3.81–5.12)
WBC # BLD AUTO: 6.65 THOUSAND/UL (ref 4.31–10.16)

## 2020-03-04 PROCEDURE — 99024 POSTOP FOLLOW-UP VISIT: CPT | Performed by: NURSE PRACTITIONER

## 2020-03-04 PROCEDURE — NC001 PR NO CHARGE: Performed by: ORTHOPAEDIC SURGERY

## 2020-03-04 PROCEDURE — 85027 COMPLETE CBC AUTOMATED: CPT | Performed by: ORTHOPAEDIC SURGERY

## 2020-03-04 RX ADMIN — LORATADINE 10 MG: 10 TABLET ORAL at 08:45

## 2020-03-04 RX ADMIN — ENOXAPARIN SODIUM 40 MG: 40 INJECTION SUBCUTANEOUS at 20:05

## 2020-03-04 RX ADMIN — MELATONIN 1000 UNITS: at 08:45

## 2020-03-04 RX ADMIN — METHOCARBAMOL TABLETS 750 MG: 750 TABLET, COATED ORAL at 05:14

## 2020-03-04 RX ADMIN — MAGNESIUM OXIDE TAB 400 MG (240 MG ELEMENTAL MG) 400 MG: 400 (240 MG) TAB at 08:45

## 2020-03-04 RX ADMIN — FLUTICASONE PROPIONATE 2 SPRAY: 50 SPRAY, METERED NASAL at 08:46

## 2020-03-04 RX ADMIN — HYDROMORPHONE HYDROCHLORIDE 0.5 MG: 1 INJECTION, SOLUTION INTRAMUSCULAR; INTRAVENOUS; SUBCUTANEOUS at 20:01

## 2020-03-04 RX ADMIN — OXYCODONE HYDROCHLORIDE 10 MG: 10 TABLET ORAL at 23:57

## 2020-03-04 RX ADMIN — ACETAMINOPHEN 975 MG: 325 TABLET ORAL at 21:11

## 2020-03-04 RX ADMIN — GABAPENTIN 300 MG: 300 CAPSULE ORAL at 08:45

## 2020-03-04 RX ADMIN — ATORVASTATIN CALCIUM 20 MG: 20 TABLET, FILM COATED ORAL at 08:45

## 2020-03-04 RX ADMIN — POLYETHYLENE GLYCOL 3350 17 G: 17 POWDER, FOR SOLUTION ORAL at 08:45

## 2020-03-04 RX ADMIN — CYANOCOBALAMIN TAB 500 MCG 1000 MCG: 500 TAB at 08:45

## 2020-03-04 RX ADMIN — PANTOPRAZOLE SODIUM 40 MG: 40 TABLET, DELAYED RELEASE ORAL at 05:15

## 2020-03-04 RX ADMIN — METHOCARBAMOL TABLETS 750 MG: 750 TABLET, COATED ORAL at 12:38

## 2020-03-04 RX ADMIN — OXYCODONE HYDROCHLORIDE 10 MG: 10 TABLET ORAL at 05:14

## 2020-03-04 RX ADMIN — VENLAFAXINE HYDROCHLORIDE 75 MG: 75 CAPSULE, EXTENDED RELEASE ORAL at 18:13

## 2020-03-04 RX ADMIN — Medication 1 TABLET: at 08:45

## 2020-03-04 RX ADMIN — ACETAMINOPHEN 975 MG: 325 TABLET ORAL at 12:39

## 2020-03-04 RX ADMIN — METHOCARBAMOL TABLETS 750 MG: 750 TABLET, COATED ORAL at 18:12

## 2020-03-04 RX ADMIN — VENLAFAXINE HYDROCHLORIDE 150 MG: 150 CAPSULE, EXTENDED RELEASE ORAL at 08:45

## 2020-03-04 RX ADMIN — METHOCARBAMOL TABLETS 750 MG: 750 TABLET, COATED ORAL at 23:57

## 2020-03-04 RX ADMIN — HYDROMORPHONE HYDROCHLORIDE 0.5 MG: 1 INJECTION, SOLUTION INTRAMUSCULAR; INTRAVENOUS; SUBCUTANEOUS at 07:59

## 2020-03-04 RX ADMIN — MELATONIN 3 MG: 3 TAB ORAL at 21:11

## 2020-03-04 RX ADMIN — CLONAZEPAM 0.5 MG: 0.5 TABLET ORAL at 21:11

## 2020-03-04 RX ADMIN — GABAPENTIN 300 MG: 300 CAPSULE ORAL at 18:12

## 2020-03-04 RX ADMIN — OXYCODONE HYDROCHLORIDE 10 MG: 10 TABLET ORAL at 18:12

## 2020-03-04 RX ADMIN — ACETAMINOPHEN 975 MG: 325 TABLET ORAL at 05:15

## 2020-03-04 NOTE — PROGRESS NOTES
Subjective: No acute events overnight  No acute distress  Objective:  A 10 point ROS was performed; negative except as noted above       Lab Results   Component Value Date/Time    WBC 6 65 03/04/2020 04:51 AM    HGB 9 0 (L) 03/04/2020 04:51 AM       Vitals:    03/03/20 2340   BP: 113/70   Pulse: 103   Resp: 18   Temp: 98 8 °F (37 1 °C)   SpO2: 95%     Musculoskeletal: LLE  Dressing C/D/I  Motor and sensation grossly intact  Toes WWP    Assessment: 75 y o  female post op day #4 from L femoral IMN    Plan:  WBAT LLE   Pain control  DVT ppx  PT/OT  Patient noted to have acute blood loss anemia due to a drop in Hbg of > 2 0g from preop levels, will monitor vital signs and resuscitate with IV fluids as needed  Dispo: Ortho will follow

## 2020-03-04 NOTE — PROGRESS NOTES
Progress Note - Geriatric Medicine   Buck Guardado 75 y o  female MRN: [id number]  Unit/Bed#: [facility] 607-01 Encounter: [id number]      Assessment/Plan:    Left intertrochanteric femoral neck fracture  -s/p IM nail with Dr Souleymane Proctor 3/1/20  -acute pain well controlled  -acute pain well controlled   -continue PT/OT  -anticipate STR at dc     Acute blood loss anemia  -Hb now remains stable at 9 4  -continue to monitor for anemia    Acute pain due to trauma  -well controlled on current regimen, continue to monitor mental status with sedating medications and taper opiate regimen as appropriate and tolerated  -continue multimodal pain control     Ambulatory dysfunction with fall  -continue fall precautions  -assistance with all transfers  -keep personal items close to prevent reaching  -encourage appropriate footwear and adequate lighting all times    Osteoporosis  -encourage at least 1000 International Units vitamin-D and 1200 mg calcium daily-  -follow-up with PCP for DEXA scan expected June 2020    Depression  -symptoms currently well controlled on venlafaxine  -continue home medication regimen  -continue close outpatient follow-up with PCP    Impaired vision  -continue to encourage use of corrective lenses at all appropriate times  -encourage appropriate lighting adequate footwear  -recommend home safety evaluation discharge    Impaired mastication  -continue ensure that consistency is appropriate for abilities    Deconditioning/debility/frailty  -continue good control chronic medical conditions  -continue to encourage well-balanced diet high in fruits and vegetables  -continue good family support    Subjective:   Patient seen and examined at the bedside where she is sitting resting comfortably with her family at her side  She reports that her pain is well controlled and she was able to get up and ambulate with her walker today  She is hopeful to be going to STR in coming days       Review of Systems   Constitutional: Negative for appetite change, chills and fever  HENT: Positive for dental problem  Negative for hearing loss and trouble swallowing  Eyes: Positive for visual disturbance  Respiratory: Negative for cough, shortness of breath and wheezing  Cardiovascular: Negative for chest pain and palpitations  Gastrointestinal: Positive for vomiting (Episode overnight resolved)  Negative for constipation and nausea  Endocrine: Negative for polyuria  Genitourinary: Negative for difficulty urinating  Musculoskeletal: Positive for gait problem  Negative for arthralgias  Skin: Negative  Neurological: Negative for dizziness, weakness and light-headedness  Hematological: Does not bruise/bleed easily  Psychiatric/Behavioral: Negative for sleep disturbance  All other systems reviewed and are negative  Objective:     Vitals: Blood pressure 121/68, pulse 95, temperature 98 9 °F (37 2 °C), resp  rate 17, height 5' 3" (1 6 m), weight 76 9 kg (169 lb 9 6 oz), SpO2 97 %  ,Body mass index is 30 04 kg/m²  Intake/Output Summary (Last 24 hours) at 3/3/2020 2150  Last data filed at 3/3/2020 2136  Gross per 24 hour   Intake 300 ml   Output 950 ml   Net -650 ml     Current Medications: Reviewed    Physical Exam:   Physical Exam   Constitutional: She is oriented to person, place, and time  She appears well-developed and well-nourished  No distress  Appears comfortable no acute distress enjoying time with family at bedside   HENT:   Head: Normocephalic and atraumatic  Mouth/Throat: Oropharynx is clear and moist    Eyes: Conjunctivae are normal  No scleral icterus  Neck: Normal range of motion  No JVD present  No tracheal deviation present  Cardiovascular: Normal rate  Pulmonary/Chest: Effort normal and breath sounds normal  No respiratory distress  She has no wheezes  Abdominal: Soft  Bowel sounds are normal  She exhibits no distension  There is no tenderness     Musculoskeletal: She exhibits no edema or tenderness  Moving all 4 extremities spontaneously   Neurological: She is alert and oriented to person, place, and time  Skin: Skin is warm and dry  She is not diaphoretic  Psychiatric: She has a normal mood and affect  Nursing note and vitals reviewed  Invasive Devices     Peripheral Intravenous Line            Peripheral IV 02/29/20 Right Antecubital 3 days    Peripheral IV 03/01/20 Right Hand 2 days          Drain            Closed/Suction Drain Right RUQ Other (Comment) 8 5 Fr  1348 days              Lab, Imaging and other studies: I have personally reviewed pertinent reports

## 2020-03-04 NOTE — PLAN OF CARE
Problem: Potential for Falls  Goal: Patient will remain free of falls  Description  INTERVENTIONS:  - Assess patient frequently for physical needs  -  Identify cognitive and physical deficits and behaviors that affect risk of falls    -  [place] fall precautions as indicated by assessment   - Educate patient/family on patient safety including physical limitations  - Instruct patient to call for assistance with activity based on assessment  - Modify environment to reduce risk of injury  - Consider OT/PT consult to assist with strengthening/mobility  Outcome: Progressing     Problem: PAIN - ADULT  Goal: Verbalizes/displays adequate comfort level or baseline comfort level  Description  Interventions:  - Encourage patient to monitor pain and request assistance  - Assess pain using appropriate pain scale  - Administer analgesics based on type and severity of pain and evaluate response  - Implement non-pharmacological measures as appropriate and evaluate response  - Consider cultural and social influences on pain and pain management  - Notify physician/advanced practitioner if interventions unsuccessful or patient reports new pain  Outcome: Progressing     Problem: INFECTION - ADULT  Goal: Absence or prevention of progression during hospitalization  Description  INTERVENTIONS:  - Assess and monitor for signs and symptoms of infection  - Monitor lab/diagnostic results  - Monitor all insertion sites, i e  indwelling lines, tubes, and drains  - Monitor endotracheal if appropriate and nasal secretions for changes in amount and color  - [place] appropriate cooling/warming therapies per order  - Administer medications as ordered  - Instruct and encourage patient and family to use good hand hygiene technique  - Identify and instruct in appropriate isolation precautions for identified infection/condition  Outcome: Progressing     Problem: SAFETY ADULT  Goal: Patient will remain free of falls  Description  INTERVENTIONS:  - Assess patient frequently for physical needs  -  Identify cognitive and physical deficits and behaviors that affect risk of falls    -  [place] fall precautions as indicated by assessment   - Educate patient/family on patient safety including physical limitations  - Instruct patient to call for assistance with activity based on assessment  - Modify environment to reduce risk of injury  - Consider OT/PT consult to assist with strengthening/mobility  Outcome: Progressing  Goal: Maintain or return to baseline ADL function  Description  INTERVENTIONS:  -  Assess patient's ability to carry out ADLs; assess patient's baseline for ADL function and identify physical deficits which impact ability to perform ADLs (bathing, care of mouth/teeth, toileting, grooming, dressing, etc )  - Assess/evaluate cause of self-care deficits   - Assess range of motion  - Assess patient's mobility; develop plan if impaired  - Assess patient's need for assistive devices and provide as appropriate  - Encourage maximum independence but intervene and supervise when necessary  - Involve family in performance of ADLs  - Assess for home care needs following discharge   - Consider OT consult to assist with ADL evaluation and planning for discharge  - Provide patient education as appropriate  Outcome: Progressing  Goal: Maintain or return mobility status to optimal level  Description  INTERVENTIONS:  - Assess patient's baseline mobility status (ambulation, transfers, stairs, etc )    - Identify cognitive and physical deficits and behaviors that affect mobility  - Identify mobility aids required to assist with transfers and/or ambulation (gait belt, sit-to-stand, lift, walker, cane, etc )  - [place] fall precautions as indicated by assessment  - Record patient progress and toleration of activity level on Mobility SBAR; progress patient to next Phase/Stage  - Instruct patient to call for assistance with activity based on assessment  - Consider rehabilitation consult to assist with strengthening/weightbearing, etc   Outcome: Progressing     Problem: DISCHARGE PLANNING  Goal: Discharge to home or other facility with appropriate resources  Description  INTERVENTIONS:  - Identify barriers to discharge w/patient and caregiver  - Arrange for needed discharge resources and transportation as appropriate  - Identify discharge learning needs (meds, wound care, etc )  - Arrange for interpretive services to assist at discharge as needed  - Refer to Case Management Department for coordinating discharge planning if the patient needs post-hospital services based on physician/advanced practitioner order or complex needs related to functional status, cognitive ability, or social support system  Outcome: Progressing     Problem: Knowledge Deficit  Goal: Patient/family/caregiver demonstrates understanding of disease process, treatment plan, medications, and discharge instructions  Description  Complete learning assessment and assess knowledge base    Interventions:  - Provide teaching at level of understanding  - Provide teaching via preferred learning methods  Outcome: Progressing     Problem: MUSCULOSKELETAL - ADULT  Goal: Maintain or return mobility to safest level of function  Description  INTERVENTIONS:  - Assess patient's ability to carry out ADLs; assess patient's baseline for ADL function and identify physical deficits which impact ability to perform ADLs (bathing, care of mouth/teeth, toileting, grooming, dressing, etc )  - Assess/evaluate cause of self-care deficits   - Assess range of motion  - Assess patient's mobility  - Assess patient's need for assistive devices and provide as appropriate  - Encourage maximum independence but intervene and supervise when necessary  - Involve family in performance of ADLs  - Assess for home care needs following discharge   - Consider OT consult to assist with ADL evaluation and planning for discharge  - Provide patient education as appropriate  Outcome: Progressing  Goal: Maintain proper alignment of affected body part  Description  INTERVENTIONS:  - Support, maintain and protect limb and body alignment  - Provide patient/ family with appropriate education  Outcome: Progressing     Problem: Prexisting or High Potential for Compromised Skin Integrity  Goal: Skin integrity is maintained or improved  Description  INTERVENTIONS:  - Identify patients at risk for skin breakdown  - Assess and monitor skin integrity  - Assess and monitor nutrition and hydration status  - Monitor labs   - Assess for incontinence   - Turn and reposition patient  - Assist with mobility/ambulation  - Relieve pressure over bony prominences  - Avoid friction and shearing  - Provide appropriate hygiene as needed including keeping skin clean and dry  - Evaluate need for skin moisturizer/barrier cream  - Collaborate with interdisciplinary team   - Patient/family teaching  - Consider wound care consult   Outcome: Progressing

## 2020-03-04 NOTE — PROGRESS NOTES
Patient not seen or examined however chart reviewed  Discussed patient with primary RN Pepe Vazquez, no events overnight  Patient currently admitted to orthopedic surgery service and is postop day 3 left TFN  Acute blood loss anemia is stable, hemoglobin 9 today  Continue all home medications for anxiety, GERD, depression, hyperlipidemia  Patient is stable for discharge from Internal Medicine standpoint  She is awaiting placement to rehab  We will continue to follow peripherally, please call with questions or concerns  ROBERTA Varma

## 2020-03-04 NOTE — SOCIAL WORK
Patient reviewed  A post acute care recommendation was made by your care team for STR  Discussed Freedom of Choice with patient  List of facilities given to patient via in person  patient aware the list is custom filtered for them by zip code location and that [facility] post acute providers are designated  Patient chose [facility], [facility]  Patient wants to go closest to her home  Referrals sent and pending in [facility]  Patient will require insurance auth once there is an accepting facility  Update:    Accepting facilities are [facility]  Patient wanted the closest facility and requested [facility]  CM requested NPI numbers from [facility] to begin insurance auth        Auth pending #[id number] 609, faxed clinicals to attn: pre certification [phone]

## 2020-03-05 ENCOUNTER — APPOINTMENT (INPATIENT)
Dept: RADIOLOGY | Facility: HOSPITAL | Age: 65
DRG: 481 | End: 2020-03-05
Payer: COMMERCIAL

## 2020-03-05 VITALS
HEIGHT: 63 IN | DIASTOLIC BLOOD PRESSURE: 45 MMHG | OXYGEN SATURATION: 96 % | RESPIRATION RATE: 18 BRPM | SYSTOLIC BLOOD PRESSURE: 112 MMHG | HEART RATE: 85 BPM | BODY MASS INDEX: 31.01 KG/M2 | TEMPERATURE: 98.7 F | WEIGHT: 175 LBS

## 2020-03-05 PROBLEM — Z87.81 STATUS POST OPEN REDUCTION WITH INTERNAL FIXATION OF FRACTURE: Status: ACTIVE | Noted: 2020-03-05

## 2020-03-05 PROBLEM — Z98.890 STATUS POST OPEN REDUCTION WITH INTERNAL FIXATION OF FRACTURE: Status: ACTIVE | Noted: 2020-03-05

## 2020-03-05 PROCEDURE — NC001 PR NO CHARGE: Performed by: ORTHOPAEDIC SURGERY

## 2020-03-05 PROCEDURE — 73552 X-RAY EXAM OF FEMUR 2/>: CPT

## 2020-03-05 PROCEDURE — NS001 PR NO SIGNATURE OR ATTESTATION: Performed by: ORTHOPAEDIC SURGERY

## 2020-03-05 PROCEDURE — 73560 X-RAY EXAM OF KNEE 1 OR 2: CPT

## 2020-03-05 RX ORDER — OXYCODONE HYDROCHLORIDE 5 MG/1
5 TABLET ORAL EVERY 4 HOURS PRN
Qty: 30 TABLET | Refills: 0 | Status: SHIPPED | OUTPATIENT
Start: 2020-03-05 | End: 2020-03-15

## 2020-03-05 RX ORDER — POLYETHYLENE GLYCOL 3350 17 G/17G
17 POWDER, FOR SOLUTION ORAL DAILY
Qty: 14 EACH | Refills: 0 | Status: SHIPPED | OUTPATIENT
Start: 2020-03-06 | End: 2020-03-05

## 2020-03-05 RX ORDER — METHOCARBAMOL 750 MG/1
750 TABLET, FILM COATED ORAL EVERY 6 HOURS SCHEDULED
Qty: 28 TABLET | Refills: 0 | Status: SHIPPED | OUTPATIENT
Start: 2020-03-05 | End: 2020-10-27

## 2020-03-05 RX ORDER — POLYETHYLENE GLYCOL 3350 17 G/17G
17 POWDER, FOR SOLUTION ORAL DAILY
Qty: 14 EACH | Refills: 0 | Status: SHIPPED | OUTPATIENT
Start: 2020-03-06 | End: 2021-06-22 | Stop reason: ALTCHOICE

## 2020-03-05 RX ORDER — METHOCARBAMOL 750 MG/1
750 TABLET, FILM COATED ORAL EVERY 6 HOURS SCHEDULED
Qty: 28 TABLET | Refills: 0 | Status: SHIPPED | OUTPATIENT
Start: 2020-03-05 | End: 2020-03-05

## 2020-03-05 RX ADMIN — ACETAMINOPHEN 975 MG: 325 TABLET ORAL at 12:53

## 2020-03-05 RX ADMIN — ATORVASTATIN CALCIUM 20 MG: 20 TABLET, FILM COATED ORAL at 08:49

## 2020-03-05 RX ADMIN — POLYETHYLENE GLYCOL 3350 17 G: 17 POWDER, FOR SOLUTION ORAL at 08:48

## 2020-03-05 RX ADMIN — FLUTICASONE PROPIONATE 2 SPRAY: 50 SPRAY, METERED NASAL at 08:52

## 2020-03-05 RX ADMIN — CYANOCOBALAMIN TAB 500 MCG 1000 MCG: 500 TAB at 08:51

## 2020-03-05 RX ADMIN — OXYCODONE HYDROCHLORIDE 10 MG: 10 TABLET ORAL at 11:29

## 2020-03-05 RX ADMIN — Medication 1 TABLET: at 08:51

## 2020-03-05 RX ADMIN — MELATONIN 1000 UNITS: at 08:48

## 2020-03-05 RX ADMIN — GABAPENTIN 300 MG: 300 CAPSULE ORAL at 08:51

## 2020-03-05 RX ADMIN — ACETAMINOPHEN 975 MG: 325 TABLET ORAL at 06:03

## 2020-03-05 RX ADMIN — METHOCARBAMOL TABLETS 750 MG: 750 TABLET, COATED ORAL at 06:03

## 2020-03-05 RX ADMIN — LORATADINE 10 MG: 10 TABLET ORAL at 08:51

## 2020-03-05 RX ADMIN — VENLAFAXINE HYDROCHLORIDE 150 MG: 150 CAPSULE, EXTENDED RELEASE ORAL at 08:50

## 2020-03-05 RX ADMIN — METHOCARBAMOL TABLETS 750 MG: 750 TABLET, COATED ORAL at 12:52

## 2020-03-05 RX ADMIN — MAGNESIUM OXIDE TAB 400 MG (240 MG ELEMENTAL MG) 400 MG: 400 (240 MG) TAB at 08:51

## 2020-03-05 RX ADMIN — PANTOPRAZOLE SODIUM 40 MG: 40 TABLET, DELAYED RELEASE ORAL at 06:03

## 2020-03-05 NOTE — DISCHARGE SUMMARY
Discharge Summary - Randolph Loges Magill 75 y o  female MRN: [id number]  [facility]    Attending Physician: Jamaica Blackburn    Admitting diagnosis: Hip injury [S79 919A]  [facility] Mundo Vivas  Closed displaced basicervical fracture of left femur, initial encounter [facility]) Marina Rough    Discharge diagnosis: Hip injury [S79 919A]  Intertrochanteric fracture of femur ([facility] ) Mundo Wilderer  Closed displaced basicervical fracture of left femur, initial encounter ([facility] ) Marina Rough    Date of admission: 2/29/2020    Date of discharge: 03/05/20    Procedure: Left femoral intramedullary nail     HPI & Hospital course:  75 y o  female who presented to the ED after a fall sustaining a left intertrochanteric fracture  Pt was admitted to the orthopaedic service and taken to the OR on 2/29/20 for left femoral intramedullary nail  Prior to surgery the risks and benefits of surgery were explained and informed consent was obtained  Surgery went without complications and pt was discharged to the PACU in a stable condition and was transferred to the floor  Patient had acute blood loss anemia which was treated with IV fluids as needed for resuscitation  On discharge date pt was cleared by PT and the medicine team and determined to be safe for discharge  Daily discussion was had with the patient, nursing staff, orthopaedic team, and family members if present  All questions were answered to the patients satisifaction  0   Lab Value Date/Time    HGB 9 0 (L) 03/04/2020 0451    HGB 9 4 (L) 03/03/2020 0441    HGB 9 1 (L) 03/02/2020 0700    HGB 11 2 (L) 03/01/2020 0500    HGB 11 9 02/29/2020 2103    HGB 12 9 10/21/2019 1051    HGB 13 0 10/31/2017 1019    HGB 13 0 02/28/2017 1453    HGB 12 7 12/13/2016 1830    HGB 10 4 (L) 05/12/2016 0813    HGB 12 4 05/11/2016 0406    HGB 12 5 05/05/2016 1630       Greater than 2 gram decrease in Hb qualifies for diagnosis of acute blood loss anemia   Vital signs remained stable and pt was resuscitated with IVF as needed  Body mass index is 31 kg/m²  mildly obese  Recommend behavior modifications, nutrition and physical activity  Discharge Instructions:   · Weight bearing as tolerated left lower extremity  · Keep dressings clean and dry at all times   · Complete DVT prophylaxis as prescribed   · Physical therapy  · Follow-up as scheduled, otherwise call for appt  Discharge Medications: For the complete list of discharge medications, please refer to the patient's medication reconciliation

## 2020-03-05 NOTE — DISCHARGE INSTRUCTIONS
Discharge Instructions - Lei Arabian Magill 75 y o  female MRN: [id number]  Unit/Bed#: [facility] 607-01    Weight Bearing Status:                                           Weight bearing as tolerated left lower extremity    DVT prophylaxis:  Complete course of Lovenox as directed    Pain:  Continue analgesics as directed    Dressing Instructions:   Keep dressing clean, dry and intact until follow up appointment  PT/OT:  Continue PT/OT on outpatient basis as directed    Appt Instructions: If you do not have your appointment, please call the clinic at [phone] to f/u with Dr Ophelia Millan in 2 weeks  Otherwise followup as scheduled     Contact the office sooner if you experience any increased numbness/tingling in the extremities

## 2020-03-05 NOTE — PHYSICAL THERAPY NOTE
Physical Therapy Cancellation Note    PT attempted to see pt for treatment session; however pt with new L femur and L knee xrays pending  Will await final read      Papito Rhoades, PT

## 2020-03-05 NOTE — UTILIZATION REVIEW
Continued Stay Review    Date: 3/                       Current Patient Class: Inpatient Current Level of Care: Med Surg    HPI:64 y o  female initially admitted on 2/29 S/p Fall with Left Intertrochanteric femur fracture    Assessment/Plan: POD #5 L Femoral IMN  WBAT LLE  Pain control  PT recommending rehab      Pertinent Labs/Diagnostic Results:   Results from last 7 days   Lab Units 03/04/20  0451 03/03/20  0441 03/02/20  0700 03/01/20  0500 02/29/20  2103   WBC Thousand/uL 6 65 9 44 6 41 7 04 14 14*   HEMOGLOBIN g/dL 9 0* 9 4* 9 1* 11 2* 11 9   HEMATOCRIT % 27 6* 30 7* 29 9* 36 1 37 6   PLATELETS Thousands/uL 303 300 254 350 354   NEUTROS ABS Thousands/µL  --   --   --  3 77  --          Results from last 7 days   Lab Units 03/03/20  0441 03/01/20  0500 02/29/20  2103   SODIUM mmol/L 139 141 142   POTASSIUM mmol/L 4 6 4 3 4 4   CHLORIDE mmol/L 108 109* 109*   CO2 mmol/L 27 25 24   ANION GAP mmol/L 4 7 9   BUN mg/dL 12 14 11   CREATININE mg/dL 0 84 1 19 0 92   EGFR ml/min/1 73sq m 73 48 65   CALCIUM mg/dL 8 8 8 9 8 8             Results from last 7 days   Lab Units 03/03/20  0441 03/01/20  0500 02/29/20  2103   GLUCOSE RANDOM mg/dL 109 92 96       Results from last 7 days   Lab Units 02/29/20  2103   PROTIME seconds 13 1   INR  1 03   PTT seconds 28       Vital Signs:   03/04/20 23:38:55  99 4 °F (37 4 °C)  93  18  106/56  73  97 %  None (Room air)  Lying   03/04/20 20:02:20  99 3 °F (37 4 °C)  87  17  106/56  73  96 %         Medications:   Scheduled Medications:  Medications:  acetaminophen 975 mg Oral Q8H SALLY   atorvastatin 20 mg Oral Daily   cholecalciferol 1,000 Units Oral Daily   clonazePAM 0 5 mg Oral HS   cyanocobalamin 1,000 mcg Oral Daily   enoxaparin 40 mg Subcutaneous Q24H SALLY   fluticasone 2 spray Nasal Daily   gabapentin 300 mg Oral BID   loratadine 10 mg Oral Daily   magnesium oxide 400 mg Oral Daily   melatonin 3 mg Oral HS   methocarbamol 750 mg Oral Q6H [facility]   multivitamin-minerals 1 tablet Oral Daily   pantoprazole 40 mg Oral Early Morning   polyethylene glycol 17 g Oral Daily   venlafaxine 150 mg Oral Daily   venlafaxine 75 mg Oral QPM     Continuous IV Infusions:     PRN Meds:  HYDROmorphone 0 5 mg Intravenous Q4H PRN 3/4 x2   oxyCODONE 10 mg Oral Q4H PRN 3/4 x3   oxyCODONE 5 mg Oral Q4H PRN     Discharge Plan:    Accepting facilities are [facility]  Patient wanted the closest facility and requested [facility]  Will start Insurance auth process for admission to [facility] Utilization Review Department  [email or web address]  ATTENTION: Please call with any questions or concerns to [phone] and carefully listen to the prompts so that you are directed to the right person  All voicemails are confidential   Wan Neely all requests for admission clinical reviews, approved or denied determinations and any other requests to dedicated fax number below belonging to the campus where the patient is receiving treatment   List of dedicated fax numbers for the Facilities:  [address] DENIALS (Administrative/Medical Necessity) [phone]   [address] (Maternity/NICU/Pediatrics) [phone]   Naomie Ramires [phone]   [facility] [phone]   Stefano Heck [phone]   Sharon Clement [phone]   1205 [facility] [address] [place] [address] [phone]   [facility]  [phone]   2200 [facility]  2401 [facility] 1000 [facility] [phone]

## 2020-03-05 NOTE — PROGRESS NOTES
Subjective: No acute events overnight  No acute distress  Objective:  A 10 point ROS was performed; negative except as noted above       Lab Results   Component Value Date/Time    WBC 6 65 03/04/2020 04:51 AM    HGB 9 0 (L) 03/04/2020 04:51 AM       Vitals:    03/04/20 2338   BP: 106/56   Pulse: 93   Resp: 18   Temp: 99 4 °F (37 4 °C)   SpO2: 97%     Musculoskeletal: LLE  Dressing C/D/I  Motor and sensation grossly intact  Toes WWP    Assessment: 75 y o  female post op day #5 from L femoral IMN    Plan:  WBAT LLE  Pain control  DVT ppx  PT/OT  Patient noted to have acute blood loss anemia due to a drop in Hbg of > 2 0g from preop levels, will monitor vital signs and resuscitate with IV fluids as needed  Dispo: Ortho will follow

## 2020-03-05 NOTE — PLAN OF CARE
Problem: Potential for Falls  Goal: Patient will remain free of falls  Description  INTERVENTIONS:  - Assess patient frequently for physical needs  -  Identify cognitive and physical deficits and behaviors that affect risk of falls    -  [place] fall precautions as indicated by assessment   - Educate patient/family on patient safety including physical limitations  - Instruct patient to call for assistance with activity based on assessment  - Modify environment to reduce risk of injury  - Consider OT/PT consult to assist with strengthening/mobility  Outcome: Progressing     Problem: PAIN - ADULT  Goal: Verbalizes/displays adequate comfort level or baseline comfort level  Description  Interventions:  - Encourage patient to monitor pain and request assistance  - Assess pain using appropriate pain scale  - Administer analgesics based on type and severity of pain and evaluate response  - Implement non-pharmacological measures as appropriate and evaluate response  - Consider cultural and social influences on pain and pain management  - Notify physician/advanced practitioner if interventions unsuccessful or patient reports new pain  Outcome: Progressing     Problem: INFECTION - ADULT  Goal: Absence or prevention of progression during hospitalization  Description  INTERVENTIONS:  - Assess and monitor for signs and symptoms of infection  - Monitor lab/diagnostic results  - Monitor all insertion sites, i e  indwelling lines, tubes, and drains  - Monitor endotracheal if appropriate and nasal secretions for changes in amount and color  - [place] appropriate cooling/warming therapies per order  - Administer medications as ordered  - Instruct and encourage patient and family to use good hand hygiene technique  - Identify and instruct in appropriate isolation precautions for identified infection/condition  Outcome: Progressing     Problem: SAFETY ADULT  Goal: Patient will remain free of falls  Description  INTERVENTIONS:  - Assess patient frequently for physical needs  -  Identify cognitive and physical deficits and behaviors that affect risk of falls    -  [place] fall precautions as indicated by assessment   - Educate patient/family on patient safety including physical limitations  - Instruct patient to call for assistance with activity based on assessment  - Modify environment to reduce risk of injury  - Consider OT/PT consult to assist with strengthening/mobility  Outcome: Progressing  Goal: Maintain or return to baseline ADL function  Description  INTERVENTIONS:  -  Assess patient's ability to carry out ADLs; assess patient's baseline for ADL function and identify physical deficits which impact ability to perform ADLs (bathing, care of mouth/teeth, toileting, grooming, dressing, etc )  - Assess/evaluate cause of self-care deficits   - Assess range of motion  - Assess patient's mobility; develop plan if impaired  - Assess patient's need for assistive devices and provide as appropriate  - Encourage maximum independence but intervene and supervise when necessary  - Involve family in performance of ADLs  - Assess for home care needs following discharge   - Consider OT consult to assist with ADL evaluation and planning for discharge  - Provide patient education as appropriate  Outcome: Progressing  Goal: Maintain or return mobility status to optimal level  Description  INTERVENTIONS:  - Assess patient's baseline mobility status (ambulation, transfers, stairs, etc )    - Identify cognitive and physical deficits and behaviors that affect mobility  - Identify mobility aids required to assist with transfers and/or ambulation (gait belt, sit-to-stand, lift, walker, cane, etc )  - [place] fall precautions as indicated by assessment  - Record patient progress and toleration of activity level on Mobility SBAR; progress patient to next Phase/Stage  - Instruct patient to call for assistance with activity based on assessment  - Consider rehabilitation consult to assist with strengthening/weightbearing, etc   Outcome: Progressing     Problem: DISCHARGE PLANNING  Goal: Discharge to home or other facility with appropriate resources  Description  INTERVENTIONS:  - Identify barriers to discharge w/patient and caregiver  - Arrange for needed discharge resources and transportation as appropriate  - Identify discharge learning needs (meds, wound care, etc )  - Arrange for interpretive services to assist at discharge as needed  - Refer to Case Management Department for coordinating discharge planning if the patient needs post-hospital services based on physician/advanced practitioner order or complex needs related to functional status, cognitive ability, or social support system  Outcome: Progressing     Problem: Knowledge Deficit  Goal: Patient/family/caregiver demonstrates understanding of disease process, treatment plan, medications, and discharge instructions  Description  Complete learning assessment and assess knowledge base    Interventions:  - Provide teaching at level of understanding  - Provide teaching via preferred learning methods  Outcome: Progressing     Problem: MUSCULOSKELETAL - ADULT  Goal: Maintain or return mobility to safest level of function  Description  INTERVENTIONS:  - Assess patient's ability to carry out ADLs; assess patient's baseline for ADL function and identify physical deficits which impact ability to perform ADLs (bathing, care of mouth/teeth, toileting, grooming, dressing, etc )  - Assess/evaluate cause of self-care deficits   - Assess range of motion  - Assess patient's mobility  - Assess patient's need for assistive devices and provide as appropriate  - Encourage maximum independence but intervene and supervise when necessary  - Involve family in performance of ADLs  - Assess for home care needs following discharge   - Consider OT consult to assist with ADL evaluation and planning for discharge  - Provide patient education as appropriate  Outcome: Progressing  Goal: Maintain proper alignment of affected body part  Description  INTERVENTIONS:  - Support, maintain and protect limb and body alignment  - Provide patient/ family with appropriate education  Outcome: Progressing     Problem: Prexisting or High Potential for Compromised Skin Integrity  Goal: Skin integrity is maintained or improved  Description  INTERVENTIONS:  - Identify patients at risk for skin breakdown  - Assess and monitor skin integrity  - Assess and monitor nutrition and hydration status  - Monitor labs   - Assess for incontinence   - Turn and reposition patient  - Assist with mobility/ambulation  - Relieve pressure over bony prominences  - Avoid friction and shearing  - Provide appropriate hygiene as needed including keeping skin clean and dry  - Evaluate need for skin moisturizer/barrier cream  - Collaborate with interdisciplinary team   - Patient/family teaching  - Consider wound care consult   Outcome: Progressing

## 2020-03-05 NOTE — SOCIAL WORK
Patient reviewed  Patient needs rehab  Insurance auth obtained for [employer] [facility] admissions coordinator from [facility] advises [facility] cannot take admissions today due to " GI bug" going around at facility  CM looking to switch auth to another facility  [facility]  CM will f/u  Update:    Patients auth switched to [facility], patient's 1st choice  Patient has transportation arranged for 4:45pm pickup via [employer] chair Karon mcgregor, [phone]  Patient was explained wheel chair Karon mcgregor is out of pocket cost and will be billed patient understands and is agreeable  PASRR uploaded and sent to facility  Patient, provider, facility and RN at bedside aware of discharge arrangements

## 2020-03-06 ENCOUNTER — TRANSITIONAL CARE MANAGEMENT (OUTPATIENT)
Dept: FAMILY MEDICINE CLINIC | Facility: CLINIC | Age: 65
End: 2020-03-06

## 2020-03-06 NOTE — UTILIZATION REVIEW
Notification of Discharge  This is a Notification of Discharge from our facility [address]  Please be advised that this patient has been discharge from our facility  Below you will find the admission and discharge date and time including the patients disposition  PRESENTATION DATE: 2/29/2020  6:24 PM  OBS ADMISSION DATE:   IP ADMISSION DATE: 2/29/20 2143   DISCHARGE DATE: 3/5/2020  7:15 PM  DISPOSITION: Non [facility] Non [facility] SN[facility]   Admission Orders listed below:  Admission Orders (From admission, onward)     Ordered        02/29/20 2143  Inpatient Admission  Once                   Please contact the UR Department if additional information is required to close this patient's authorization/case  [address] Utilization Review Department  Main: [phone] x carefully listen to the prompts  All voicemails are confidential   [email or web address]  Send all requests for admission clinical reviews, approved or denied determinations and any other requests to dedicated fax number below belonging to the campus where the patient is receiving treatment   List of dedicated fax numbers:  [address] DENIALS (Administrative/Medical Necessity) [phone]   [address] (Maternity/NICU/Pediatrics) [phone]   [facility] [phone]   [facility] [phone]   [facility] [phone]   [address] [phone]   [facility]  [phone]   2205 [facility]  2401 [facility] 1000 [facility] [phone]

## 2020-03-10 DIAGNOSIS — F32.A DEPRESSION, UNSPECIFIED DEPRESSION TYPE: ICD-10-CM

## 2020-03-10 DIAGNOSIS — F41.9 ANXIETY: ICD-10-CM

## 2020-03-10 DIAGNOSIS — G89.4 CHRONIC PAIN SYNDROME: ICD-10-CM

## 2020-03-10 DIAGNOSIS — K21.9 GASTROESOPHAGEAL REFLUX DISEASE, ESOPHAGITIS PRESENCE NOT SPECIFIED: ICD-10-CM

## 2020-03-10 DIAGNOSIS — M17.0 OSTEOARTHRITIS OF BOTH KNEES, UNSPECIFIED OSTEOARTHRITIS TYPE: ICD-10-CM

## 2020-03-10 RX ORDER — VENLAFAXINE HYDROCHLORIDE 150 MG/1
CAPSULE, EXTENDED RELEASE ORAL
Qty: 90 CAPSULE | Refills: 1 | Status: SHIPPED | OUTPATIENT
Start: 2020-03-10 | End: 2020-09-07

## 2020-03-10 RX ORDER — MELOXICAM 7.5 MG/1
TABLET ORAL
Qty: 30 TABLET | Refills: 3 | OUTPATIENT
Start: 2020-03-10

## 2020-03-10 RX ORDER — GABAPENTIN 300 MG/1
CAPSULE ORAL
Qty: 270 CAPSULE | Refills: 0 | Status: SHIPPED | OUTPATIENT
Start: 2020-03-10 | End: 2020-06-08

## 2020-03-10 RX ORDER — OMEPRAZOLE 40 MG/1
40 CAPSULE, DELAYED RELEASE ORAL DAILY
Qty: 30 CAPSULE | Refills: 4 | Status: SHIPPED | OUTPATIENT
Start: 2020-03-10 | End: 2020-08-02

## 2020-03-10 RX ORDER — VENLAFAXINE HYDROCHLORIDE 75 MG/1
CAPSULE, EXTENDED RELEASE ORAL
Qty: 90 CAPSULE | Refills: 1 | Status: SHIPPED | OUTPATIENT
Start: 2020-03-10 | End: 2020-09-07

## 2020-03-10 RX ORDER — CLONAZEPAM 0.5 MG/1
0.5 TABLET ORAL
Qty: 30 TABLET | Refills: 1 | Status: SHIPPED | OUTPATIENT
Start: 2020-03-10 | End: 2020-05-11

## 2020-03-23 ENCOUNTER — TELEPHONE (OUTPATIENT)
Dept: OBGYN CLINIC | Facility: HOSPITAL | Age: 65
End: 2020-03-23

## 2020-03-23 DIAGNOSIS — R52 PAIN: Primary | ICD-10-CM

## 2020-03-23 RX ORDER — SENNOSIDES 8.6 MG
650 CAPSULE ORAL EVERY 8 HOURS PRN
Qty: 30 TABLET | Refills: 0 | Status: SHIPPED | OUTPATIENT
Start: 2020-03-23

## 2020-03-23 NOTE — TELEPHONE ENCOUNTER
I left msg on patient's voicemail for her to call our office    She will be made aware RX for Tylenol 650mg was sent to [facility] per request

## 2020-03-23 NOTE — TELEPHONE ENCOUNTER
Fitz Hailey  [phone]    [facility] Sunny Gentile, [facility]  [phone]    Patient could not find Tylenol, could only find migrane Tylenol with Marina Bailey Told patient not to take because of Lovenox  Please return call

## 2020-03-23 NOTE — TELEPHONE ENCOUNTER
I spoke with Holly Mcclure, the pharmacy does not have OTC plain tylenol  Would you be willing to send RX for Acetaminophen 650mg to patient's pharmacy on file  Thank you

## 2020-03-24 ENCOUNTER — TELEPHONE (OUTPATIENT)
Dept: OBGYN CLINIC | Facility: HOSPITAL | Age: 65
End: 2020-03-24

## 2020-03-24 NOTE — TELEPHONE ENCOUNTER
Patient is unable to get a ride to the office, she cant use LYFT because she cant get out of the apt alone   Patients needs to have stiches to come out, Stormy from the [facility] wants to know if she needs to come into the office or can the [facility] take the sutures out         [phone]

## 2020-03-24 NOTE — TELEPHONE ENCOUNTER
Patient may have her sutures removed by [facility]    This constitutes my verbal permission for her to have her sutures removed  Also, an appointment for an x-ray would be appropriate 4-6 weeks after the surgery, from the date of surgery      Thank you    HAMIDA

## 2020-04-01 ENCOUNTER — TELEPHONE (OUTPATIENT)
Dept: OBGYN CLINIC | Facility: HOSPITAL | Age: 65
End: 2020-04-01

## 2020-04-07 DIAGNOSIS — M17.0 OSTEOARTHRITIS OF BOTH KNEES, UNSPECIFIED OSTEOARTHRITIS TYPE: ICD-10-CM

## 2020-04-07 RX ORDER — MELOXICAM 7.5 MG/1
7.5 TABLET ORAL DAILY
Qty: 30 TABLET | Refills: 0 | Status: SHIPPED | OUTPATIENT
Start: 2020-04-07 | End: 2020-04-20 | Stop reason: SDUPTHER

## 2020-04-20 DIAGNOSIS — M17.0 OSTEOARTHRITIS OF BOTH KNEES, UNSPECIFIED OSTEOARTHRITIS TYPE: ICD-10-CM

## 2020-04-20 RX ORDER — MELOXICAM 7.5 MG/1
7.5 TABLET ORAL DAILY
Qty: 30 TABLET | Refills: 0 | Status: SHIPPED | OUTPATIENT
Start: 2020-04-20 | End: 2020-05-11

## 2020-05-09 DIAGNOSIS — M17.0 OSTEOARTHRITIS OF BOTH KNEES, UNSPECIFIED OSTEOARTHRITIS TYPE: ICD-10-CM

## 2020-05-09 DIAGNOSIS — F41.9 ANXIETY: ICD-10-CM

## 2020-05-11 RX ORDER — MELOXICAM 7.5 MG/1
7.5 TABLET ORAL DAILY
Qty: 30 TABLET | Refills: 0 | Status: SHIPPED | OUTPATIENT
Start: 2020-05-11 | End: 2020-06-08

## 2020-05-11 RX ORDER — CLONAZEPAM 0.5 MG/1
TABLET ORAL
Qty: 30 TABLET | Refills: 1 | Status: SHIPPED | OUTPATIENT
Start: 2020-05-11 | End: 2020-07-29 | Stop reason: SDUPTHER

## 2020-05-22 ENCOUNTER — OFFICE VISIT (OUTPATIENT)
Dept: OBGYN CLINIC | Facility: HOSPITAL | Age: 65
End: 2020-05-22
Payer: COMMERCIAL

## 2020-05-22 ENCOUNTER — HOSPITAL ENCOUNTER (OUTPATIENT)
Dept: RADIOLOGY | Facility: HOSPITAL | Age: 65
Discharge: HOME | End: 2020-05-22
Attending: ORTHOPAEDIC SURGERY
Payer: COMMERCIAL

## 2020-05-22 VITALS
SYSTOLIC BLOOD PRESSURE: 96 MMHG | BODY MASS INDEX: 31.01 KG/M2 | HEIGHT: 63 IN | HEART RATE: 89 BPM | DIASTOLIC BLOOD PRESSURE: 52 MMHG | WEIGHT: 175 LBS

## 2020-05-22 DIAGNOSIS — M54.16 LEFT LUMBAR RADICULOPATHY: ICD-10-CM

## 2020-05-22 DIAGNOSIS — Z48.89 AFTERCARE FOLLOWING SURGERY: ICD-10-CM

## 2020-05-22 DIAGNOSIS — M70.62 TROCHANTERIC BURSITIS OF LEFT HIP: ICD-10-CM

## 2020-05-22 DIAGNOSIS — Z87.81 S/P ORIF (OPEN REDUCTION INTERNAL FIXATION) FRACTURE: Primary | ICD-10-CM

## 2020-05-22 DIAGNOSIS — M79.605 LEFT LEG PAIN: ICD-10-CM

## 2020-05-22 DIAGNOSIS — Z98.890 S/P ORIF (OPEN REDUCTION INTERNAL FIXATION) FRACTURE: Primary | ICD-10-CM

## 2020-05-22 PROCEDURE — 3008F BODY MASS INDEX DOCD: CPT | Performed by: ORTHOPAEDIC SURGERY

## 2020-05-22 PROCEDURE — 99213 OFFICE O/P EST LOW 20 MIN: CPT | Performed by: ORTHOPAEDIC SURGERY

## 2020-05-22 PROCEDURE — 99024 POSTOP FOLLOW-UP VISIT: CPT | Performed by: ORTHOPAEDIC SURGERY

## 2020-05-22 PROCEDURE — 73552 X-RAY EXAM OF FEMUR 2/>: CPT

## 2020-05-22 PROCEDURE — 20610 DRAIN/INJ JOINT/BURSA W/O US: CPT | Performed by: ORTHOPAEDIC SURGERY

## 2020-05-22 PROCEDURE — 4040F PNEUMOC VAC/ADMIN/RCVD: CPT | Performed by: ORTHOPAEDIC SURGERY

## 2020-05-22 PROCEDURE — 1160F RVW MEDS BY RX/DR IN RCRD: CPT | Performed by: ORTHOPAEDIC SURGERY

## 2020-05-22 RX ORDER — LIDOCAINE HYDROCHLORIDE 10 MG/ML
2 INJECTION, SOLUTION INFILTRATION; PERINEURAL
Status: COMPLETED | OUTPATIENT
Start: 2020-05-22 | End: 2020-05-22

## 2020-05-22 RX ORDER — BETAMETHASONE SODIUM PHOSPHATE AND BETAMETHASONE ACETATE 3; 3 MG/ML; MG/ML
12 INJECTION, SUSPENSION INTRA-ARTICULAR; INTRALESIONAL; INTRAMUSCULAR; SOFT TISSUE
Status: COMPLETED | OUTPATIENT
Start: 2020-05-22 | End: 2020-05-22

## 2020-05-22 RX ORDER — BUPIVACAINE HYDROCHLORIDE 2.5 MG/ML
2 INJECTION, SOLUTION INFILTRATION; PERINEURAL
Status: COMPLETED | OUTPATIENT
Start: 2020-05-22 | End: 2020-05-22

## 2020-05-22 RX ADMIN — BETAMETHASONE SODIUM PHOSPHATE AND BETAMETHASONE ACETATE 12 MG: 3; 3 INJECTION, SUSPENSION INTRA-ARTICULAR; INTRALESIONAL; INTRAMUSCULAR; SOFT TISSUE at 16:28

## 2020-05-22 RX ADMIN — BUPIVACAINE HYDROCHLORIDE 2 ML: 2.5 INJECTION, SOLUTION INFILTRATION; PERINEURAL at 16:28

## 2020-05-22 RX ADMIN — LIDOCAINE HYDROCHLORIDE 2 ML: 10 INJECTION, SOLUTION INFILTRATION; PERINEURAL at 16:28

## 2020-06-08 DIAGNOSIS — G89.4 CHRONIC PAIN SYNDROME: ICD-10-CM

## 2020-06-08 DIAGNOSIS — M17.0 OSTEOARTHRITIS OF BOTH KNEES, UNSPECIFIED OSTEOARTHRITIS TYPE: ICD-10-CM

## 2020-06-08 RX ORDER — MELOXICAM 7.5 MG/1
7.5 TABLET ORAL DAILY
Qty: 30 TABLET | Refills: 0 | Status: SHIPPED | OUTPATIENT
Start: 2020-06-08 | End: 2020-07-08

## 2020-06-08 RX ORDER — GABAPENTIN 300 MG/1
CAPSULE ORAL
Qty: 270 CAPSULE | Refills: 0 | Status: SHIPPED | OUTPATIENT
Start: 2020-06-08 | End: 2020-06-15 | Stop reason: SDUPTHER

## 2020-06-15 DIAGNOSIS — G89.4 CHRONIC PAIN SYNDROME: ICD-10-CM

## 2020-06-15 RX ORDER — GABAPENTIN 300 MG/1
300 CAPSULE ORAL 2 TIMES DAILY
Qty: 180 CAPSULE | Refills: 0 | Status: SHIPPED | OUTPATIENT
Start: 2020-06-15 | End: 2020-09-07

## 2020-06-23 ENCOUNTER — CONSULT (OUTPATIENT)
Dept: PAIN MEDICINE | Facility: CLINIC | Age: 65
End: 2020-06-23
Payer: COMMERCIAL

## 2020-06-23 VITALS
HEART RATE: 90 BPM | TEMPERATURE: 97.6 F | HEIGHT: 63 IN | BODY MASS INDEX: 28.35 KG/M2 | WEIGHT: 160 LBS | SYSTOLIC BLOOD PRESSURE: 106 MMHG | DIASTOLIC BLOOD PRESSURE: 56 MMHG

## 2020-06-23 DIAGNOSIS — M54.42 CHRONIC BILATERAL LOW BACK PAIN WITH LEFT-SIDED SCIATICA: Primary | ICD-10-CM

## 2020-06-23 DIAGNOSIS — M51.26 LUMBAR DISC HERNIATION: ICD-10-CM

## 2020-06-23 DIAGNOSIS — G89.29 CHRONIC BILATERAL LOW BACK PAIN WITH LEFT-SIDED SCIATICA: Primary | ICD-10-CM

## 2020-06-23 DIAGNOSIS — M54.16 LEFT LUMBAR RADICULOPATHY: ICD-10-CM

## 2020-06-23 DIAGNOSIS — G89.4 CHRONIC PAIN SYNDROME: ICD-10-CM

## 2020-06-23 PROCEDURE — 99204 OFFICE O/P NEW MOD 45 MIN: CPT | Performed by: PHYSICAL MEDICINE & REHABILITATION

## 2020-06-23 PROCEDURE — 3008F BODY MASS INDEX DOCD: CPT | Performed by: PHYSICAL MEDICINE & REHABILITATION

## 2020-06-23 PROCEDURE — 1160F RVW MEDS BY RX/DR IN RCRD: CPT | Performed by: PHYSICAL MEDICINE & REHABILITATION

## 2020-06-23 PROCEDURE — 4040F PNEUMOC VAC/ADMIN/RCVD: CPT | Performed by: PHYSICAL MEDICINE & REHABILITATION

## 2020-06-23 PROCEDURE — 1036F TOBACCO NON-USER: CPT | Performed by: PHYSICAL MEDICINE & REHABILITATION

## 2020-06-23 RX ORDER — METHYLPREDNISOLONE 4 MG/1
TABLET ORAL
Qty: 1 EACH | Refills: 0 | Status: SHIPPED | OUTPATIENT
Start: 2020-06-23 | End: 2020-08-28 | Stop reason: ALTCHOICE

## 2020-07-05 ENCOUNTER — HOSPITAL ENCOUNTER (OUTPATIENT)
Dept: MRI IMAGING | Facility: HOSPITAL | Age: 65
Discharge: HOME | End: 2020-07-05
Attending: PHYSICAL MEDICINE & REHABILITATION
Payer: COMMERCIAL

## 2020-07-05 DIAGNOSIS — G89.29 CHRONIC BILATERAL LOW BACK PAIN WITH LEFT-SIDED SCIATICA: ICD-10-CM

## 2020-07-05 DIAGNOSIS — M54.42 CHRONIC BILATERAL LOW BACK PAIN WITH LEFT-SIDED SCIATICA: ICD-10-CM

## 2020-07-05 DIAGNOSIS — M54.16 LEFT LUMBAR RADICULOPATHY: ICD-10-CM

## 2020-07-05 DIAGNOSIS — M51.26 LUMBAR DISC HERNIATION: ICD-10-CM

## 2020-07-05 DIAGNOSIS — G89.4 CHRONIC PAIN SYNDROME: ICD-10-CM

## 2020-07-05 PROCEDURE — 72148 MRI LUMBAR SPINE W/O DYE: CPT

## 2020-07-08 DIAGNOSIS — F41.9 ANXIETY: ICD-10-CM

## 2020-07-08 DIAGNOSIS — M17.0 OSTEOARTHRITIS OF BOTH KNEES, UNSPECIFIED OSTEOARTHRITIS TYPE: ICD-10-CM

## 2020-07-08 RX ORDER — CLONAZEPAM 0.5 MG/1
TABLET ORAL
Qty: 30 TABLET | Refills: 0 | OUTPATIENT
Start: 2020-07-08

## 2020-07-08 RX ORDER — MELOXICAM 7.5 MG/1
7.5 TABLET ORAL DAILY
Qty: 30 TABLET | Refills: 0 | Status: SHIPPED | OUTPATIENT
Start: 2020-07-08 | End: 2020-08-02

## 2020-07-10 ENCOUNTER — TELEPHONE (OUTPATIENT)
Dept: PAIN MEDICINE | Facility: CLINIC | Age: 65
End: 2020-07-10

## 2020-07-10 DIAGNOSIS — M51.16 LUMBAR DISC DISEASE WITH RADICULOPATHY: Primary | ICD-10-CM

## 2020-07-10 NOTE — TELEPHONE ENCOUNTER
Patient called requesting her MRI results be called in to her   Please advise, enrike    Call back# [phone]

## 2020-07-10 NOTE — TELEPHONE ENCOUNTER
MRI reviewed and discussed with patient at 12:05 p m    Continues to demonstrate clinical and now diagnostic evidence of lumbar radiculopathy into the left lower extremity  Patient is agreeable and interested in pursuing interventional approaches    Please schedule patient for left-sided L3-L4 and L4-L5 TFESI  Order already placed  Thank you

## 2020-07-22 ENCOUNTER — HOSPITAL ENCOUNTER (OUTPATIENT)
Dept: RADIOLOGY | Facility: CLINIC | Age: 65
Discharge: HOME | End: 2020-07-22
Attending: PHYSICAL MEDICINE & REHABILITATION | Admitting: PHYSICAL MEDICINE & REHABILITATION
Payer: COMMERCIAL

## 2020-07-22 VITALS
SYSTOLIC BLOOD PRESSURE: 124 MMHG | TEMPERATURE: 97.5 F | DIASTOLIC BLOOD PRESSURE: 72 MMHG | HEART RATE: 86 BPM | OXYGEN SATURATION: 92 % | RESPIRATION RATE: 16 BRPM

## 2020-07-22 DIAGNOSIS — M51.16 LUMBAR DISC DISEASE WITH RADICULOPATHY: ICD-10-CM

## 2020-07-22 PROCEDURE — 64484 NJX AA&/STRD TFRM EPI L/S EA: CPT | Performed by: PHYSICAL MEDICINE & REHABILITATION

## 2020-07-22 PROCEDURE — 64483 NJX AA&/STRD TFRM EPI L/S 1: CPT | Performed by: PHYSICAL MEDICINE & REHABILITATION

## 2020-07-22 RX ORDER — 0.9 % SODIUM CHLORIDE 0.9 %
10 VIAL (ML) INJECTION ONCE
Status: COMPLETED | OUTPATIENT
Start: 2020-07-22 | End: 2020-07-22

## 2020-07-22 RX ORDER — BUPIVACAINE HCL/PF 2.5 MG/ML
10 VIAL (ML) INJECTION ONCE
Status: COMPLETED | OUTPATIENT
Start: 2020-07-22 | End: 2020-07-22

## 2020-07-22 RX ORDER — PAPAVERINE HCL 150 MG
20 CAPSULE, EXTENDED RELEASE ORAL ONCE
Status: COMPLETED | OUTPATIENT
Start: 2020-07-22 | End: 2020-07-22

## 2020-07-22 RX ADMIN — BUPIVACAINE HYDROCHLORIDE 4 ML: 2.5 INJECTION, SOLUTION EPIDURAL; INFILTRATION; INTRACAUDAL at 10:38

## 2020-07-22 RX ADMIN — SODIUM CHLORIDE 4 ML: 9 INJECTION, SOLUTION INTRAMUSCULAR; INTRAVENOUS; SUBCUTANEOUS at 10:42

## 2020-07-22 RX ADMIN — DEXAMETHASONE SODIUM PHOSPHATE 20 MG: 10 INJECTION, SOLUTION INTRAMUSCULAR; INTRAVENOUS at 10:39

## 2020-07-22 RX ADMIN — Medication 4 ML: at 10:38

## 2020-07-22 RX ADMIN — IOHEXOL 1 ML: 300 INJECTION, SOLUTION INTRAVENOUS at 10:39

## 2020-07-22 NOTE — DISCHARGE INSTR - LAB
Epidural Steroid Injection   WHAT YOU NEED TO KNOW:   An epidural steroid injection (PHILIPP) is a procedure to inject steroid medicine into the epidural space  The epidural space is between your spinal cord and vertebrae  Steroids reduce inflammation and fluid buildup in your spine that may be causing pain  You may be given pain medicine along with the steroids  ACTIVITY  · Do not drive or operate machinery today  · No strenuous activity today - bending, lifting, etc   · You may resume normal activites starting tomorrow - start slowly and as tolerated  · You may shower today, but no tub baths or hot tubs  · You may have numbness for several hours from the local anesthetic  Please use caution and common sense, especially with weight-bearing activities  CARE OF THE INJECTION SITE  · If you have soreness or pain, apply ice to the area today (20 minutes on/20 minutes off)  · Starting tomorrow, you may use warm, moist heat or ice if needed  · You may have an increase or change in your discomfort for 36-48 hours after your treatment  · Apply ice and continue with any pain medication you have been prescribed  · Notify the [facility] if you have any of the following: redness, drainage, swelling, headache, stiff neck or fever above 100°F     SPECIAL INSTRUCTIONS  · Our office will contact you in approximately 7 days for a progress report  MEDICATIONS  · Continue to take all routine medications  · Our office may have instructed you to hold some medications  If you have a problem specifically related to your procedure, please call our office at [phone]  Problems not related to your procedure should be directed to your primary care physician

## 2020-07-22 NOTE — H&P
History of Present Illness: The patient is a 75 y o  female who presents with complaints of left-sided low back pain    Patient Active Problem List   Diagnosis    Acute calculous cholecystitis    Allergic rhinitis    Anxiety    Elevated TSH    Fatigue    Gamekeeper's thumb of right hand    Gastroesophageal reflux disease    Headache    Imbalance    Lower back pain    Moderate major depression (HCC)    Osteoarthritis    Closed displaced basicervical fracture of left femur (HCC)    Other hyperlipidemia    Preoperative examination    Intertrochanteric fracture of femur (HCC)    Anemia    Status post left femoral intramedullary nail    Aftercare following surgery    Trochanteric bursitis of left hip       Past Medical History:   Diagnosis Date    Anxiety     Depression     Fractures     GERD (gastroesophageal reflux disease)     Low back pain     Panic attacks     Psychiatric disorder     anxiety, depression       Past Surgical History:   Procedure Laterality Date    BLADDER SUSPENSION  1980    Mesh    CHOLECYSTECTOMY      COLONOSCOPY      GALLBLADDER SURGERY      OK LAP,CHOLECYSTECTOMY N/A 7/11/2016    Procedure: LAPAROSCOPIC CHOLECYSTECTOMY ;  Surgeon: Lauren Alcala DO;  Location: [facility];  Service: General  Last assessed: 5/24/16    OK OPEN RX FEMUR FX+INTRAMED NEMO Left 3/1/2020    Procedure: INSERTION NAIL IM FEMUR ANTEGRADE (TROCHANTERIC);   Surgeon: Sharda Fenton MD;  Location: [facility];  Service: Orthopedics    TUBAL LIGATION  1976    URETHRA SURGERY           Current Outpatient Medications:     acetaminophen (TYLENOL) 650 mg CR tablet, Take 1 tablet (650 mg total) by mouth every 8 (eight) hours as needed for mild pain, Disp: 30 tablet, Rfl: 0    atorvastatin (LIPITOR) 20 mg tablet, Take 1 tablet (20 mg total) by mouth daily, Disp: 90 tablet, Rfl: 3    benzonatate (TESSALON) 200 MG capsule, Take 1 capsule (200 mg total) by mouth 3 (three) times a day as needed for cough (Patient not taking: Reported on 5/22/2020), Disp: 90 capsule, Rfl: 1    Calcium 600 MG tablet, Take 600 mg by mouth daily , Disp: , Rfl:     Cholecalciferol (VITAMIN D3) 1000 units CAPS, Take 1,000 Units by mouth daily , Disp: , Rfl:     clonazePAM (KlonoPIN) 0 5 mg tablet, Take 1 tablet by mouth daily at bedtime  , Disp: 30 tablet, Rfl: 1    cyanocobalamin 1000 MCG tablet, Take 1,000 mcg by mouth daily , Disp: , Rfl:     enoxaparin (LOVENOX) 40 mg/0 4 mL, Inject 0 4 mL (40 mg total) under the skin every 24 hours for 23 days (Patient not taking: Reported on 5/22/2020), Disp: 9 2 mL, Rfl: 0    fluticasone (FLONASE) 50 mcg/act nasal spray, 2 sprays into each nostril daily, Disp: 1 Bottle, Rfl: 5    gabapentin (NEURONTIN) 300 mg capsule, Take 1 capsule (300 mg total) by mouth 2 (two) times a day, Disp: 180 capsule, Rfl: 0    loratadine (CLARITIN) 10 mg tablet, Take 1 tablet (10 mg total) by mouth daily, Disp: 90 tablet, Rfl: 1    Magnesium Oxide -Mg Supplement 400 MG CAPS, Take 1 capsule by mouth daily, Disp: , Rfl:     Melatonin 1 MG CAPS, Take by mouth, Disp: , Rfl:     meloxicam (MOBIC) 7 5 mg tablet, Take 1 tablet (7 5 mg total) by mouth daily, Disp: 30 tablet, Rfl: 0    methocarbamol (ROBAXIN) 750 mg tablet, Take 1 tablet (750 mg total) by mouth every 6 (six) hours for 7 days, Disp: 28 tablet, Rfl: 0    methylPREDNISolone 4 MG tablet therapy pack, Use as directed on package, Disp: 1 each, Rfl: 0    Multiple Vitamin (MULTI-VITAMIN DAILY) TABS, Take by mouth, Disp: , Rfl:     omeprazole (PriLOSEC) 40 MG capsule, Take 1 capsule (40 mg total) by mouth daily, Disp: 30 capsule, Rfl: 4    polyethylene glycol (MIRALAX) 17 g packet, Take 17 g by mouth daily, Disp: 14 each, Rfl: 0    venlafaxine (EFFEXOR-XR) 150 mg 24 hr capsule, Take 1 capsule by mouth in the morning , Disp: 90 capsule, Rfl: 1    venlafaxine (EFFEXOR-XR) 75 mg 24 hr capsule, Take 1 capsule by mouth in the evening , Disp: 90 capsule, Rfl: 1    Current Facility-Administered Medications:     bupivacaine (PF) (MARCAINE) 0 25 % injection 10 mL, 10 mL, Epidural, Once, Angel Fort, DO    dexamethasone (PF) (DECADRON) injection 20 mg, 20 mg, Epidural, Once, Angel Fort, DO    iohexol (OMNIPAQUE) 300 mg/mL injection 50 mL, 50 mL, Epidural, Once, Angel Fort, DO    lidocaine (PF) (XYLOCAINE-MPF) 2 % injection 5 mL, 5 mL, Infiltration, Once, Angel Fort, DO    sodium chloride (PF) 0 9 % injection 10 mL, 10 mL, Infiltration, Once, Angel Fort, DO    Allergies   Allergen Reactions    Vancomycin Hives    Penicillins Hives and Rash       Physical Exam:   Vitals:    07/22/20 1025   BP: 114/70   Pulse: 93   Resp: 16   Temp: 97 5 °F (36 4 °C)   SpO2: 96%     General: Awake, Alert, Oriented x 3, Mood and affect appropriate  Respiratory: Respirations even and unlabored  Cardiovascular: Peripheral pulses intact; no edema  Musculoskeletal Exam:  Tenderness to palpation left-sided lumbar paraspinals    ASA Score: 2  Patient has been made explicitly aware that the injection will consist of steroid which may cause a temporary suppression in immune system activity  This, in turn, may increase the patient's risk of chanell corona virus  He was advised to continue with social distancing and self quarantine  Patient wishes to proceed with the injection    Patient/Chart Verification  Patient ID Verified: Verbal  ID Band Applied: No  Consents Confirmed: Procedural, To be obtained in the Pre-Procedure area  H&P( within 30 days) Verified: To be obtained in the Pre-Procedure area  Allergies Reviewed: Yes  Anticoag/NSAID held?: No  Currently on antibiotics?: No    Assessment:   1   Lumbar disc disease with radiculopathy        Plan: Left-sided L3-L4 and L4-L5 TFESI

## 2020-07-29 ENCOUNTER — TELEPHONE (OUTPATIENT)
Dept: PAIN MEDICINE | Facility: CLINIC | Age: 65
End: 2020-07-29

## 2020-07-29 DIAGNOSIS — F41.9 ANXIETY: ICD-10-CM

## 2020-07-29 NOTE — TELEPHONE ENCOUNTER
Per PDMP last fill 07/01/20  Last OV 02/13/20, Next OV 08/28/20 07/01/2020 1 05/11/2020 CLONAZEPAM 0 5 MG TABLET 30 0 30 [facility] POG [id number] PILLP ([id number]) 1 Medicare PA

## 2020-07-30 ENCOUNTER — TELEPHONE (OUTPATIENT)
Dept: RHEUMATOLOGY | Facility: CLINIC | Age: 65
End: 2020-07-30

## 2020-07-30 RX ORDER — CLONAZEPAM 0.5 MG/1
0.5 TABLET ORAL
Qty: 30 TABLET | Refills: 1 | Status: SHIPPED | OUTPATIENT
Start: 2020-07-30 | End: 2020-09-07

## 2020-07-30 NOTE — TELEPHONE ENCOUNTER
Call from [phone] [facility]   They mail this to patient and want to know if you can send RX sooner rather than later to give them time to process

## 2020-08-02 DIAGNOSIS — K21.9 GASTROESOPHAGEAL REFLUX DISEASE, ESOPHAGITIS PRESENCE NOT SPECIFIED: ICD-10-CM

## 2020-08-02 DIAGNOSIS — R05.9 COUGH: ICD-10-CM

## 2020-08-02 DIAGNOSIS — M17.0 OSTEOARTHRITIS OF BOTH KNEES, UNSPECIFIED OSTEOARTHRITIS TYPE: ICD-10-CM

## 2020-08-02 RX ORDER — OMEPRAZOLE 40 MG/1
CAPSULE, DELAYED RELEASE ORAL
Qty: 30 CAPSULE | Refills: 2 | Status: SHIPPED | OUTPATIENT
Start: 2020-08-02 | End: 2020-11-01

## 2020-08-02 RX ORDER — LORATADINE 10 MG/1
TABLET ORAL
Qty: 90 TABLET | Refills: 1 | Status: SHIPPED | OUTPATIENT
Start: 2020-08-02 | End: 2021-01-20 | Stop reason: SDUPTHER

## 2020-08-02 RX ORDER — MELOXICAM 7.5 MG/1
7.5 TABLET ORAL DAILY
Qty: 30 TABLET | Refills: 0 | Status: SHIPPED | OUTPATIENT
Start: 2020-08-02 | End: 2020-09-09

## 2020-08-04 NOTE — TELEPHONE ENCOUNTER
Pt returned call to schedule an 4 week f/u w/ Dr Junior Alba   Please be advise thank you      Patient call back #[phone]

## 2020-08-28 ENCOUNTER — OFFICE VISIT (OUTPATIENT)
Dept: FAMILY MEDICINE CLINIC | Facility: CLINIC | Age: 65
End: 2020-08-28
Payer: COMMERCIAL

## 2020-08-28 VITALS
DIASTOLIC BLOOD PRESSURE: 68 MMHG | HEIGHT: 63 IN | WEIGHT: 150 LBS | HEART RATE: 86 BPM | BODY MASS INDEX: 26.58 KG/M2 | TEMPERATURE: 98.5 F | SYSTOLIC BLOOD PRESSURE: 116 MMHG

## 2020-08-28 DIAGNOSIS — F33.41 RECURRENT MAJOR DEPRESSIVE DISORDER, IN PARTIAL REMISSION (HCC): ICD-10-CM

## 2020-08-28 DIAGNOSIS — M70.62 TROCHANTERIC BURSITIS OF LEFT HIP: ICD-10-CM

## 2020-08-28 DIAGNOSIS — M51.16 LUMBAR DISC DISEASE WITH RADICULOPATHY: ICD-10-CM

## 2020-08-28 DIAGNOSIS — S72.042S CLOSED DISPLACED BASICERVICAL FRACTURE OF LEFT FEMUR, SEQUELA: ICD-10-CM

## 2020-08-28 DIAGNOSIS — F41.9 ANXIETY: ICD-10-CM

## 2020-08-28 DIAGNOSIS — R79.89 ELEVATED TSH: ICD-10-CM

## 2020-08-28 DIAGNOSIS — Z12.12 SCREENING FOR COLORECTAL CANCER: ICD-10-CM

## 2020-08-28 DIAGNOSIS — E78.49 OTHER HYPERLIPIDEMIA: ICD-10-CM

## 2020-08-28 DIAGNOSIS — Z00.00 MEDICARE ANNUAL WELLNESS VISIT, SUBSEQUENT: Primary | ICD-10-CM

## 2020-08-28 DIAGNOSIS — Z12.11 SCREENING FOR COLORECTAL CANCER: ICD-10-CM

## 2020-08-28 PROCEDURE — 3725F SCREEN DEPRESSION PERFORMED: CPT | Performed by: FAMILY MEDICINE

## 2020-08-28 PROCEDURE — G0439 PPPS, SUBSEQ VISIT: HCPCS | Performed by: FAMILY MEDICINE

## 2020-08-28 PROCEDURE — 99214 OFFICE O/P EST MOD 30 MIN: CPT | Performed by: FAMILY MEDICINE

## 2020-08-28 PROCEDURE — 1170F FXNL STATUS ASSESSED: CPT | Performed by: FAMILY MEDICINE

## 2020-08-28 PROCEDURE — 3008F BODY MASS INDEX DOCD: CPT | Performed by: FAMILY MEDICINE

## 2020-08-28 PROCEDURE — 1160F RVW MEDS BY RX/DR IN RCRD: CPT | Performed by: FAMILY MEDICINE

## 2020-08-28 PROCEDURE — 1036F TOBACCO NON-USER: CPT | Performed by: FAMILY MEDICINE

## 2020-08-28 PROCEDURE — 4040F PNEUMOC VAC/ADMIN/RCVD: CPT | Performed by: FAMILY MEDICINE

## 2020-08-28 PROCEDURE — 1125F AMNT PAIN NOTED PAIN PRSNT: CPT | Performed by: FAMILY MEDICINE

## 2020-08-28 NOTE — PROGRESS NOTES
Assessment/Plan:    Lumbar disc disease with radiculopathy  With persistent pain  Refer back to pain management  Cont present meds    Trochanteric bursitis of left hip  Persistent  F/u with ortho    Closed displaced basicervical fracture of left femur ([address] )  Still with hip and femur pain  Unclear if this is related to back, fx or both  F/u with pain management and ortho    Other hyperlipidemia  Check labs  Monitor diet  BMI Counseling: Body mass index is 26 57 kg/m²  The BMI is above normal  Nutrition recommendations include reducing portion sizes, decreasing overall calorie intake, reducing intake of saturated fat and trans fat and reducing intake of cholesterol  Recheck 6m      Recurrent major depressive disorder, in partial remission (HCC)  PHQ-2 = 0 though pt appears somewhat down  She denies worsening depression however  Continue present care  Recheck 6m - earlier if worse    Elevated TSH  Recheck labs    Anxiety  Continue present care  Recheck 6m       Diagnoses and all orders for this visit:    Medicare annual wellness visit, subsequent    Other hyperlipidemia    Recurrent major depressive disorder, in partial remission (HCC)    Anxiety    Elevated TSH    Lumbar disc disease with radiculopathy    Trochanteric bursitis of left hip    Closed displaced basicervical fracture of left femur, sequela    Screening for colorectal cancer  -     Ambulatory referral to Colorectal Surgery; Future          Subjective:      Patient ID: Judie Rincon is a 75 y o  female  f/u multiple med issues and AWV  - pt still struggling with low back pain that radiates down her L leg  Pt is not sure if it is related to her back or from her L hip (s/p L femur fracture last Feb)  Has seen Dr Antoine Watson (Pain Management) and Dr Sandrita Hennessy (ortho) - no relief from injections  Has to ambulate with a cane, but feels that she cannot walk more than 1/2 to 1 block without having to stop due to pain   No change in bowel/bladder function (except for diarrhea)  - tries to exercise her L leg  Denies CP, palpitations, lightheadedness or other CV symptoms with or without exertion  - mood is "miserable" due to pain and the news (COVID, etc)  Still gets "panicky" if she has to go in a store  - has refrained from smoking  stopped smoking a few months ago  Breathing is stable  - AWV done      The following portions of the patient's history were reviewed and updated as appropriate: She  has a past medical history of Anxiety, Depression, Fractures, GERD (gastroesophageal reflux disease), Low back pain, Panic attacks, and Psychiatric disorder  She   Patient Active Problem List    Diagnosis Date Noted    Lumbar disc disease with radiculopathy     Aftercare following surgery 05/22/2020    Trochanteric bursitis of left hip 05/22/2020    Status post left femoral intramedullary nail 03/05/2020    Anemia 03/02/2020    Closed displaced basicervical fracture of left femur ([facility] [address] ) 02/29/2020    Other hyperlipidemia 02/29/2020    Preoperative examination 02/29/2020    Intertrochanteric fracture of femur ([facility] [address] ) 02/29/2020    Recurrent major depressive disorder, in partial remission ([facility] [address] ) 10/31/2017    Allergic rhinitis 06/29/2017    Gamekeeper's thumb of right hand 04/11/2017    Headache 02/28/2017    Imbalance 02/28/2017    Elevated TSH 12/16/2016    Osteoarthritis 05/26/2013    Fatigue 12/13/2012    Gastroesophageal reflux disease 11/13/2012    Lower back pain 10/14/2012    Anxiety 09/18/2012     She  has a past surgical history that includes Bladder suspension (1980); Tubal ligation (1976); pr lap,cholecystectomy (N/A, 7/11/2016); Gallbladder surgery; Urethra surgery; Colonoscopy; Cholecystectomy; and pr open rx femur fx+intramed delphine (Left, 3/1/2020)  She  reports that she quit smoking about 16 months ago  Her smoking use included cigarettes  She smoked 0 00 packs per day for 15 00 years   She has never used smokeless tobacco  She reports that she does not drink alcohol or use drugs  Current Outpatient Medications   Medication Sig Dispense Refill    acetaminophen (TYLENOL) 650 mg CR tablet Take 1 tablet (650 mg total) by mouth every 8 (eight) hours as needed for mild pain 30 tablet 0    atorvastatin (LIPITOR) 20 mg tablet Take 1 tablet (20 mg total) by mouth daily 90 tablet 3    benzonatate (TESSALON) 200 MG capsule Take 1 capsule (200 mg total) by mouth 3 (three) times a day as needed for cough (Patient not taking: Reported on 5/22/2020) 90 capsule 1    Calcium 600 MG tablet Take 600 mg by mouth daily       Cholecalciferol (VITAMIN D3) 1000 units CAPS Take 1,000 Units by mouth daily       clonazePAM (KlonoPIN) 0 5 mg tablet Take 1 tablet (0 5 mg total) by mouth daily at bedtime 30 tablet 1    cyanocobalamin 1000 MCG tablet Take 1,000 mcg by mouth daily       fluticasone (FLONASE) 50 mcg/act nasal spray 2 sprays into each nostril daily 1 Bottle 5    gabapentin (NEURONTIN) 300 mg capsule Take 1 capsule (300 mg total) by mouth 2 (two) times a day 180 capsule 0    loratadine (CLARITIN) 10 mg tablet Take 1 tablet by mouth daily  90 tablet 1    Magnesium Oxide -Mg Supplement 400 MG CAPS Take 1 capsule by mouth daily      Melatonin 1 MG CAPS Take by mouth      meloxicam (MOBIC) 7 5 mg tablet Take 1 tablet (7 5 mg total) by mouth daily 30 tablet 0    methocarbamol (ROBAXIN) 750 mg tablet Take 1 tablet (750 mg total) by mouth every 6 (six) hours for 7 days 28 tablet 0    Multiple Vitamin (MULTI-VITAMIN DAILY) TABS Take by mouth      omeprazole (PriLOSEC) 40 MG capsule Take 1 capsule by mouth daily  30 capsule 2    polyethylene glycol (MIRALAX) 17 g packet Take 17 g by mouth daily 14 each 0    venlafaxine (EFFEXOR-XR) 150 mg 24 hr capsule Take 1 capsule by mouth in the morning  90 capsule 1    venlafaxine (EFFEXOR-XR) 75 mg 24 hr capsule Take 1 capsule by mouth in the evening  90 capsule 1     No current facility-administered medications for this visit  She is allergic to vancomycin and penicillins       Review of Systems   Constitutional: Negative  HENT: Negative  Eyes: Negative  Respiratory: Negative  Cardiovascular: Negative  Gastrointestinal: Negative  Endocrine: Negative  Genitourinary: Negative  Musculoskeletal: Positive for arthralgias, back pain, gait problem and myalgias  Negative for joint swelling  Skin: Negative  Allergic/Immunologic: Negative  Neurological: Positive for weakness (with prolonged ambulation)  Negative for dizziness, numbness and headaches  Hematological: Negative  Psychiatric/Behavioral: Negative  Objective:      /68   Pulse 86   Temp 98 5 °F (36 9 °C)   Ht 5' 3" (1 6 m)   Wt 68 kg (150 lb)   BMI 26 57 kg/m²          Physical Exam  Vitals signs reviewed  Constitutional:       Appearance: Normal appearance  She is well-developed  She is not diaphoretic  HENT:      Head: Normocephalic and atraumatic  Right Ear: Tympanic membrane, ear canal and external ear normal       Left Ear: Tympanic membrane, ear canal and external ear normal    Eyes:      Extraocular Movements: Extraocular movements intact  Conjunctiva/sclera: Conjunctivae normal       Pupils: Pupils are equal, round, and reactive to light  Neck:      Musculoskeletal: Normal range of motion and neck supple  No muscular tenderness  Thyroid: No thyromegaly  Vascular: No JVD  Cardiovascular:      Rate and Rhythm: Normal rate and regular rhythm  Heart sounds: No murmur  Pulmonary:      Effort: Pulmonary effort is normal       Breath sounds: Normal breath sounds  Abdominal:      General: There is no distension  Palpations: Abdomen is soft  There is no mass  Tenderness: There is no abdominal tenderness  Musculoskeletal: Normal range of motion  General: Tenderness (over lower lumbar spine, paraspinal muscles and SI joints  Sl TTP over the trochanteric bursa bilat) present   No swelling  Right lower leg: No edema  Left lower leg: No edema  Lymphadenopathy:      Cervical: No cervical adenopathy  Skin:     General: Skin is warm and dry  Capillary Refill: Capillary refill takes less than 2 seconds  Neurological:      General: No focal deficit present  Mental Status: She is alert and oriented to person, place, and time  Cranial Nerves: No cranial nerve deficit  Sensory: No sensory deficit  Motor: No weakness or abnormal muscle tone  Coordination: Coordination normal       Deep Tendon Reflexes: Reflexes are normal and symmetric  Reflexes normal       Comments: minicog 4/5   Psychiatric:         Mood and Affect: Mood normal          Behavior: Behavior normal          Thought Content:  Thought content normal          Judgment: Judgment normal       Comments: PHQ-2 = 0 (though pt seems frustrated with overall situaton)

## 2020-08-28 NOTE — PATIENT INSTRUCTIONS
Medicare Preventive Visit Patient Instructions  Thank you for completing your Welcome to Medicare Visit or Medicare Annual Wellness Visit today  Your next wellness visit will be due in one year (8/28/2021)  The screening/preventive services that you may require over the next 5-10 years are detailed below  Some tests may not apply to you based off risk factors and/or age  Screening tests ordered at today's visit but not completed yet may show as past due  Also, please note that scanned in results may not display below  Preventive Screenings:  Service Recommendations Previous Testing/Comments   Colorectal Cancer Screening  * Colonoscopy    * Fecal Occult Blood Test (FOBT)/Fecal Immunochemical Test (FIT)  * Fecal DNA/Cologuard Test  * Flexible Sigmoidoscopy Age: 54-65 years old   Colonoscopy: every 10 years (may be performed more frequently if at higher risk)  OR  FOBT/FIT: every 1 year  OR  Cologuard: every 3 years  OR  Sigmoidoscopy: every 5 years  Screening may be recommended earlier than age 48 if at higher risk for colorectal cancer  Also, an individualized decision between you and your healthcare provider will decide whether screening between the ages of 74-80 would be appropriate  Colonoscopy: 09/11/2019  FOBT/FIT: Not on file  Cologuard: 06/11/2019  Sigmoidoscopy: Not on file    Screening Current     Breast Cancer Screening Age: 36 years old  Frequency: every 1-2 years  Not required if history of left and right mastectomy Mammogram: 10/16/2002       Cervical Cancer Screening Between the ages of 21-29, pap smear recommended once every 3 years  Between the ages of 33-67, can perform pap smear with HPV co-testing every 5 years     Recommendations may differ for women with a history of total hysterectomy, cervical cancer, or abnormal pap smears in past  Pap Smear: Not on file    Screening Not Indicated   Hepatitis C Screening Once for adults born between [facility]  More frequently in patients at high risk for Hepatitis C Hep C Antibody: 10/21/2019    Screening Current   Diabetes Screening 1-2 times per year if you're at risk for diabetes or have pre-diabetes Fasting glucose: 79 mg/dL   A1C: No results in last 5 years    Screening Current   Cholesterol Screening Once every 5 years if you don't have a lipid disorder  May order more often based on risk factors  Lipid panel: 02/13/2020    Screening Not Indicated  History Lipid Disorder     Other Preventive Screenings Covered by Medicare:  1  Abdominal Aortic Aneurysm (AAA) Screening: covered once if your at risk  You're considered to be at risk if you have a family history of AAA  2  Lung Cancer Screening: covers low dose CT scan once per year if you meet all of the following conditions: (1) Age 50-69; (2) No signs or symptoms of lung cancer; (3) Current smoker or have quit smoking within the last 15 years; (4) You have a tobacco smoking history of at least 30 pack years (packs per day multiplied by number of years you smoked); (5) You get a written order from a healthcare provider  3  Glaucoma Screening: covered annually if you're considered high risk: (1) You have diabetes OR (2) Family history of glaucoma OR (3)  aged 48 and older OR (3)  American aged 72 and older  3  Osteoporosis Screening: covered every 2 years if you meet one of the following conditions: (1) You're estrogen deficient and at risk for osteoporosis based off medical history and other findings; (2) Have a vertebral abnormality; (3) On glucocorticoid therapy for more than 3 months; (4) Have primary hyperparathyroidism; (5) On osteoporosis medications and need to assess response to drug therapy  · Last bone density test (DXA Scan): Not on file  5  HIV Screening: covered annually if you're between the age of 12-76  Also covered annually if you are younger than 13 and older than 72 with risk factors for HIV infection   For pregnant patients, it is covered up to 3 times per pregnancy  Immunizations:  Immunization Recommendations   Influenza Vaccine Annual influenza vaccination during flu season is recommended for all persons aged >= 6 months who do not have contraindications   Pneumococcal Vaccine (Prevnar and Pneumovax)  * Prevnar = PCV13  * Pneumovax = PPSV23   Adults 25-60 years old: 1-3 doses may be recommended based on certain risk factors  Adults 72 years old: Prevnar (PCV13) vaccine recommended followed by Pneumovax (PPSV23) vaccine  If already received PPSV23 since turning 65, then PCV13 recommended at least one year after PPSV23 dose  Hepatitis B Vaccine 3 dose series if at intermediate or high risk (ex: diabetes, end stage renal disease, liver disease)   Tetanus (Td) Vaccine - COST NOT COVERED BY MEDICARE PART B Following completion of primary series, a booster dose should be given every 10 years to maintain immunity against tetanus  Td may also be given as tetanus wound prophylaxis  Tdap Vaccine - COST NOT COVERED BY MEDICARE PART B Recommended at least once for all adults  For pregnant patients, recommended with each pregnancy  Shingles Vaccine (Shingrix) - COST NOT COVERED BY MEDICARE PART B  2 shot series recommended in those aged 48 and above     Health Maintenance Due:      Topic Date Due    DXA SCAN  1955    Cervical Cancer Screening  12/13/1974    MAMMOGRAM  10/16/2003    Hepatitis C Screening  Completed     Immunizations Due:  There are no preventive care reminders to display for this patient  Advance Directives   What are advance directives? Advance directives are legal documents that state your wishes and plans for medical care  These plans are made ahead of time in case you lose your ability to make decisions for yourself  Advance directives can apply to any medical decision, such as the treatments you want, and if you want to donate organs  What are the types of advance directives?   There are many types of advance directives, and each state has rules about how to use them  You may choose a combination of any of the following:  · Living will: This is a written record of the treatment you want  You can also choose which treatments you do not want, which to limit, and which to stop at a certain time  This includes surgery, medicine, IV fluid, and tube feedings  · Durable power of  for healthcare [facility]): This is a written record that states who you want to make healthcare choices for you when you are unable to make them for yourself  This person, called a proxy, is usually a family member or a friend  You may choose more than 1 proxy  · Do not resuscitate (DNR) order:  A DNR order is used in case your heart stops beating or you stop breathing  It is a request not to have certain forms of treatment, such as CPR  A DNR order may be included in other types of advance directives  · Medical directive: This covers the care that you want if you are in a coma, near death, or unable to make decisions for yourself  You can list the treatments you want for each condition  Treatment may include pain medicine, surgery, blood transfusions, dialysis, IV or tube feedings, and a ventilator (breathing machine)  · Values history: This document has questions about your views, beliefs, and how you feel and think about life  This information can help others choose the care that you would choose  Why are advance directives important? An advance directive helps you control your care  Although spoken wishes may be used, it is better to have your wishes written down  Spoken wishes can be misunderstood, or not followed  Treatments may be given even if you do not want them  An advance directive may make it easier for your family to make difficult choices about your care     Weight Management   Why it is important to manage your weight:  Being overweight increases your risk of health conditions such as heart disease, high blood pressure, type 2 diabetes, and certain types of cancer  It can also increase your risk for osteoarthritis, sleep apnea, and other respiratory problems  Aim for a slow, steady weight loss  Even a small amount of weight loss can lower your risk of health problems  How to lose weight safely:  A safe and healthy way to lose weight is to eat fewer calories and get regular exercise  You can lose up about 1 pound a week by decreasing the number of calories you eat by 500 calories each day  Healthy meal plan for weight management:  A healthy meal plan includes a variety of foods, contains fewer calories, and helps you stay healthy  A healthy meal plan includes the following:  · Eat whole-grain foods more often  A healthy meal plan should contain fiber  Fiber is the part of grains, fruits, and vegetables that is not broken down by your body  Whole-grain foods are healthy and provide extra fiber in your diet  Some examples of whole-grain foods are whole-wheat breads and pastas, oatmeal, brown rice, and bulgur  · Eat a variety of vegetables every day  Include dark, leafy greens such as spinach, kale, ericka greens, and mustard greens  Eat yellow and orange vegetables such as carrots, sweet potatoes, and winter squash  · Eat a variety of fruits every day  Choose fresh or canned fruit (canned in its own juice or light syrup) instead of juice  Fruit juice has very little or no fiber  · Eat low-fat dairy foods  Drink fat-free (skim) milk or 1% milk  Eat fat-free yogurt and low-fat cottage cheese  Try low-fat cheeses such as mozzarella and other reduced-fat cheeses  · Choose meat and other protein foods that are low in fat  Choose beans or other legumes such as split peas or lentils  Choose fish, skinless poultry (chicken or turkey), or lean cuts of red meat (beef or pork)  Before you cook meat or poultry, cut off any visible fat  · Use less fat and oil  Try baking foods instead of frying them   Add less fat, such as margarine, sour cream, regular salad dressing and mayonnaise to foods  Eat fewer high-fat foods  Some examples of high-fat foods include french fries, doughnuts, ice cream, and cakes  · Eat fewer sweets  Limit foods and drinks that are high in sugar  This includes candy, cookies, regular soda, and sweetened drinks  Exercise:  Exercise at least 30 minutes per day on most days of the week  Some examples of exercise include walking, biking, dancing, and swimming  You can also fit in more physical activity by taking the stairs instead of the elevator or parking farther away from stores  Ask your healthcare provider about the best exercise plan for you  © Copyright [employer] 2018 Information is for End User's use only and may not be sold, redistributed or otherwise used for commercial purposes  All illustrations and images included in CareNotes® are the copyrighted property of [employer] , [employer]  or [facility] Preventive Visit Patient Instructions  Thank you for completing your Welcome to Medicare Visit or Medicare Annual Wellness Visit today  Your next wellness visit will be due in one year (8/28/2021)  The screening/preventive services that you may require over the next 5-10 years are detailed below  Some tests may not apply to you based off risk factors and/or age  Screening tests ordered at today's visit but not completed yet may show as past due  Also, please note that scanned in results may not display below    Preventive Screenings:  Service Recommendations Previous Testing/Comments   Colorectal Cancer Screening  * Colonoscopy    * Fecal Occult Blood Test (FOBT)/Fecal Immunochemical Test (FIT)  * Fecal DNA/Cologuard Test  * Flexible Sigmoidoscopy Age: 54-65 years old   Colonoscopy: every 10 years (may be performed more frequently if at higher risk)  OR  FOBT/FIT: every 1 year  OR  Cologuard: every 3 years  OR  Sigmoidoscopy: every 5 years  Screening may be recommended earlier than age 48 if at higher risk for colorectal cancer  Also, an individualized decision between you and your healthcare provider will decide whether screening between the ages of 74-80 would be appropriate  Colonoscopy: 09/11/2019  FOBT/FIT: Not on file  Cologuard: 06/11/2019  Sigmoidoscopy: Not on file    Screening Current     Breast Cancer Screening Age: 36 years old  Frequency: every 1-2 years  Not required if history of left and right mastectomy Mammogram: 10/16/2002       Cervical Cancer Screening Between the ages of 21-29, pap smear recommended once every 3 years  Between the ages of 33-67, can perform pap smear with HPV co-testing every 5 years  Recommendations may differ for women with a history of total hysterectomy, cervical cancer, or abnormal pap smears in past  Pap Smear: Not on file    Screening Not Indicated   Hepatitis C Screening Once for adults born between 1945 and 1965  More frequently in patients at high risk for Hepatitis C Hep C Antibody: 10/21/2019    Screening Current   Diabetes Screening 1-2 times per year if you're at risk for diabetes or have pre-diabetes Fasting glucose: 79 mg/dL   A1C: No results in last 5 years    Screening Current   Cholesterol Screening Once every 5 years if you don't have a lipid disorder  May order more often based on risk factors  Lipid panel: 02/13/2020    Screening Not Indicated  History Lipid Disorder     Other Preventive Screenings Covered by Medicare:  6  Abdominal Aortic Aneurysm (AAA) Screening: covered once if your at risk  You're considered to be at risk if you have a family history of AAA    7  Lung Cancer Screening: covers low dose CT scan once per year if you meet all of the following conditions: (1) Age 50-69; (2) No signs or symptoms of lung cancer; (3) Current smoker or have quit smoking within the last 15 years; (4) You have a tobacco smoking history of at least 30 pack years (packs per day multiplied by number of years you smoked); (5) You get a written order from a healthcare provider  8  Glaucoma Screening: covered annually if you're considered high risk: (1) You have diabetes OR (2) Family history of glaucoma OR (3)  aged 48 and older OR (3)  American aged 72 and older  5  Osteoporosis Screening: covered every 2 years if you meet one of the following conditions: (1) You're estrogen deficient and at risk for osteoporosis based off medical history and other findings; (2) Have a vertebral abnormality; (3) On glucocorticoid therapy for more than 3 months; (4) Have primary hyperparathyroidism; (5) On osteoporosis medications and need to assess response to drug therapy  · Last bone density test (DXA Scan): Not on file  10  HIV Screening: covered annually if you're between the age of 12-76  Also covered annually if you are younger than 13 and older than 72 with risk factors for HIV infection  For pregnant patients, it is covered up to 3 times per pregnancy  Immunizations:  Immunization Recommendations   Influenza Vaccine Annual influenza vaccination during flu season is recommended for all persons aged >= 6 months who do not have contraindications   Pneumococcal Vaccine (Prevnar and Pneumovax)  * Prevnar = PCV13  * Pneumovax = PPSV23   Adults 25-60 years old: 1-3 doses may be recommended based on certain risk factors  Adults 72 years old: Prevnar (PCV13) vaccine recommended followed by Pneumovax (PPSV23) vaccine  If already received PPSV23 since turning 65, then PCV13 recommended at least one year after PPSV23 dose  Hepatitis B Vaccine 3 dose series if at intermediate or high risk (ex: diabetes, end stage renal disease, liver disease)   Tetanus (Td) Vaccine - COST NOT COVERED BY MEDICARE PART B Following completion of primary series, a booster dose should be given every 10 years to maintain immunity against tetanus  Td may also be given as tetanus wound prophylaxis  Tdap Vaccine - COST NOT COVERED BY MEDICARE PART B Recommended at least once for all adults  For pregnant patients, recommended with each pregnancy  Shingles Vaccine (Shingrix) - COST NOT COVERED BY MEDICARE PART B  2 shot series recommended in those aged 48 and above     Health Maintenance Due:      Topic Date Due    DXA SCAN  1955    Cervical Cancer Screening  12/13/1974    MAMMOGRAM  10/16/2003    Hepatitis C Screening  Completed     Immunizations Due:  There are no preventive care reminders to display for this patient  Advance Directives   What are advance directives? Advance directives are legal documents that state your wishes and plans for medical care  These plans are made ahead of time in case you lose your ability to make decisions for yourself  Advance directives can apply to any medical decision, such as the treatments you want, and if you want to donate organs  What are the types of advance directives? There are many types of advance directives, and each state has rules about how to use them  You may choose a combination of any of the following:  · Living will: This is a written record of the treatment you want  You can also choose which treatments you do not want, which to limit, and which to stop at a certain time  This includes surgery, medicine, IV fluid, and tube feedings  · Durable power of  for healthcare [place] [facility]): This is a written record that states who you want to make healthcare choices for you when you are unable to make them for yourself  This person, called a proxy, is usually a family member or a friend  You may choose more than 1 proxy  · Do not resuscitate (DNR) order:  A DNR order is used in case your heart stops beating or you stop breathing  It is a request not to have certain forms of treatment, such as CPR  A DNR order may be included in other types of advance directives  · Medical directive: This covers the care that you want if you are in a coma, near death, or unable to make decisions for yourself   You can list the treatments you want for each condition  Treatment may include pain medicine, surgery, blood transfusions, dialysis, IV or tube feedings, and a ventilator (breathing machine)  · Values history: This document has questions about your views, beliefs, and how you feel and think about life  This information can help others choose the care that you would choose  Why are advance directives important? An advance directive helps you control your care  Although spoken wishes may be used, it is better to have your wishes written down  Spoken wishes can be misunderstood, or not followed  Treatments may be given even if you do not want them  An advance directive may make it easier for your family to make difficult choices about your care  Weight Management   Why it is important to manage your weight:  Being overweight increases your risk of health conditions such as heart disease, high blood pressure, type 2 diabetes, and certain types of cancer  It can also increase your risk for osteoarthritis, sleep apnea, and other respiratory problems  Aim for a slow, steady weight loss  Even a small amount of weight loss can lower your risk of health problems  How to lose weight safely:  A safe and healthy way to lose weight is to eat fewer calories and get regular exercise  You can lose up about 1 pound a week by decreasing the number of calories you eat by 500 calories each day  Healthy meal plan for weight management:  A healthy meal plan includes a variety of foods, contains fewer calories, and helps you stay healthy  A healthy meal plan includes the following:  · Eat whole-grain foods more often  A healthy meal plan should contain fiber  Fiber is the part of grains, fruits, and vegetables that is not broken down by your body  Whole-grain foods are healthy and provide extra fiber in your diet  Some examples of whole-grain foods are whole-wheat breads and pastas, oatmeal, brown rice, and bulgur  · Eat a variety of vegetables every day    Include dark, leafy greens such as spinach, kale, ericka greens, and mustard greens  Eat yellow and orange vegetables such as carrots, sweet potatoes, and winter squash  · Eat a variety of fruits every day  Choose fresh or canned fruit (canned in its own juice or light syrup) instead of juice  Fruit juice has very little or no fiber  · Eat low-fat dairy foods  Drink fat-free (skim) milk or 1% milk  Eat fat-free yogurt and low-fat cottage cheese  Try low-fat cheeses such as mozzarella and other reduced-fat cheeses  · Choose meat and other protein foods that are low in fat  Choose beans or other legumes such as split peas or lentils  Choose fish, skinless poultry (chicken or turkey), or lean cuts of red meat (beef or pork)  Before you cook meat or poultry, cut off any visible fat  · Use less fat and oil  Try baking foods instead of frying them  Add less fat, such as margarine, sour cream, regular salad dressing and mayonnaise to foods  Eat fewer high-fat foods  Some examples of high-fat foods include french fries, doughnuts, ice cream, and cakes  · Eat fewer sweets  Limit foods and drinks that are high in sugar  This includes candy, cookies, regular soda, and sweetened drinks  Exercise:  Exercise at least 30 minutes per day on most days of the week  Some examples of exercise include walking, biking, dancing, and swimming  You can also fit in more physical activity by taking the stairs instead of the elevator or parking farther away from stores  Ask your healthcare provider about the best exercise plan for you  © Copyright [employer] 2018 Information is for End User's use only and may not be sold, redistributed or otherwise used for commercial purposes   All illustrations and images included in CareNotes® are the copyrighted property of [employer]  or [address] [place] [address]

## 2020-08-28 NOTE — PROGRESS NOTES
Assessment and Plan:     Problem List Items Addressed This Visit        Nervous and Auditory    Lumbar disc disease with radiculopathy     With persistent pain  Refer back to pain management  Cont present meds            Musculoskeletal and Integument    Closed displaced basicervical fracture of left femur ([address] )     Still with hip and femur pain  Unclear if this is related to back, fx or both  F/u with pain management and ortho         Trochanteric bursitis of left hip     Persistent  F/u with ortho            Other    Anxiety     Continue present care  Recheck 6m         Elevated TSH     Recheck labs         Other hyperlipidemia     Check labs  Monitor diet  BMI Counseling: Body mass index is 26 57 kg/m²  The BMI is above normal  Nutrition recommendations include reducing portion sizes, decreasing overall calorie intake, reducing intake of saturated fat and trans fat and reducing intake of cholesterol  Recheck 6m           Recurrent major depressive disorder, in partial remission (HCC)     PHQ-2 = 0 though pt appears somewhat down  She denies worsening depression however  Continue present care  Recheck 6m - earlier if worse           Other Visit Diagnoses     Medicare annual wellness visit, subsequent    -  Primary    Screening for colorectal cancer        Relevant Orders    Ambulatory referral to Colorectal Surgery           Preventive health issues were discussed with patient, and age appropriate screening tests were ordered as noted in patient's After Visit Summary  Personalized health advice and appropriate referrals for health education or preventive services given if needed, as noted in patient's After Visit Summary       History of Present Illness:     Patient presents for Medicare Annual Wellness visit    Patient Care Team:  Mikaela Olivarez MD as PCP - Galileo Salinas MD     Problem List:     Patient Active Problem List   Diagnosis    Allergic rhinitis    Anxiety    Elevated TSH    Fatigue    Gamekeeper's thumb of right hand    Gastroesophageal reflux disease    Headache    Imbalance    Lower back pain    Recurrent major depressive disorder, in partial remission ([facility] [address] )    Osteoarthritis    Closed displaced basicervical fracture of left femur (HCC)    Other hyperlipidemia    Preoperative examination    Intertrochanteric fracture of femur (HCC)    Anemia    Status post left femoral intramedullary nail    Aftercare following surgery    Trochanteric bursitis of left hip    Lumbar disc disease with radiculopathy      Past Medical and Surgical History:     Past Medical History:   Diagnosis Date    Anxiety     Depression     Fractures     GERD (gastroesophageal reflux disease)     Low back pain     Panic attacks     Psychiatric disorder     anxiety, depression     Past Surgical History:   Procedure Laterality Date    BLADDER SUSPENSION  1980    Mesh    CHOLECYSTECTOMY      COLONOSCOPY      GALLBLADDER SURGERY      TX LAP,CHOLECYSTECTOMY N/A 7/11/2016    Procedure: LAPAROSCOPIC CHOLECYSTECTOMY ;  Surgeon: Valerie Ha DO;  Location[facility];  Service: General  Last assessed: 5/24/16    TX OPEN RX FEMUR FX+INTRAMED NEMO Left 3/1/2020    Procedure: INSERTION NAIL IM FEMUR ANTEGRADE (TROCHANTERIC); Surgeon: Hi Pinto MD;  [facility]: [facility];  Service: Orthopedics    TUBAL LIGATION  1976    URETHRA SURGERY        Family History:     Family History   Problem Relation Age of Onset    Other Mother         Methicillin Resistant Staphylococcus Aureus Infection    Mental illness Mother     Alzheimer's disease Mother     Lung cancer Father     Alzheimer's disease Other       Social History:     E-Cigarette/Vaping    E-Cigarette Use Never User      E-Cigarette/Vaping Substances    Nicotine No     Flavoring No      Social History     Socioeconomic History    Marital status:       Spouse name: None    Number of children: None    Years of education: 15    Highest education level: None   Occupational History    Occupation: Retired   Social Needs    Financial resource strain: None    Food insecurity     Worry: None     Inability: None    Transportation needs     Medical: None     Non-medical: None   Tobacco Use    Smoking status: Former Smoker     Packs/day: 0 00     Years: 15 00     Pack years: 0 00     Types: Cigarettes     Last attempt to quit: 2019     Years since quittin 3    Smokeless tobacco: Never Used   Substance and Sexual Activity    Alcohol use: Never     Alcohol/week: 0 0 standard drinks     Frequency: Never     Drinks per session: Patient refused     Binge frequency: Never    Drug use: No    Sexual activity: None   Lifestyle    Physical activity     Days per week: None     Minutes per session: None    Stress: None   Relationships    Social connections     Talks on phone: None     Gets together: None     Attends Jewish service: None     Active member of club or organization: None     Attends meetings of clubs or organizations: None     Relationship status: None    Intimate partner violence     Fear of current or ex partner: None     Emotionally abused: None     Physically abused: None     Forced sexual activity: None   Other Topics Concern    None   Social History Narrative    Always uses setbelt    Denied History of Dental care: regularly    Does not exercise    Multiple organ donor    No guns in home    No living will    Pets in the home    Denied history of Power of  in existence    Tea    Water intake, adequate (per day)    Denied history of daily cola consumption      Medications and Allergies:     Current Outpatient Medications   Medication Sig Dispense Refill    acetaminophen (TYLENOL) 650 mg CR tablet Take 1 tablet (650 mg total) by mouth every 8 (eight) hours as needed for mild pain 30 tablet 0    atorvastatin (LIPITOR) 20 mg tablet Take 1 tablet (20 mg total) by mouth daily 90 tablet 3    benzonatate (TESSALON) 200 MG capsule Take 1 capsule (200 mg total) by mouth 3 (three) times a day as needed for cough (Patient not taking: Reported on 5/22/2020) 90 capsule 1    Calcium 600 MG tablet Take 600 mg by mouth daily       Cholecalciferol (VITAMIN D3) 1000 units CAPS Take 1,000 Units by mouth daily       clonazePAM (KlonoPIN) 0 5 mg tablet Take 1 tablet (0 5 mg total) by mouth daily at bedtime 30 tablet 1    cyanocobalamin 1000 MCG tablet Take 1,000 mcg by mouth daily       fluticasone (FLONASE) 50 mcg/act nasal spray 2 sprays into each nostril daily 1 Bottle 5    gabapentin (NEURONTIN) 300 mg capsule Take 1 capsule (300 mg total) by mouth 2 (two) times a day 180 capsule 0    loratadine (CLARITIN) 10 mg tablet Take 1 tablet by mouth daily  90 tablet 1    Magnesium Oxide -Mg Supplement 400 MG CAPS Take 1 capsule by mouth daily      Melatonin 1 MG CAPS Take by mouth      meloxicam (MOBIC) 7 5 mg tablet Take 1 tablet (7 5 mg total) by mouth daily 30 tablet 0    methocarbamol (ROBAXIN) 750 mg tablet Take 1 tablet (750 mg total) by mouth every 6 (six) hours for 7 days 28 tablet 0    Multiple Vitamin (MULTI-VITAMIN DAILY) TABS Take by mouth      omeprazole (PriLOSEC) 40 MG capsule Take 1 capsule by mouth daily  30 capsule 2    polyethylene glycol (MIRALAX) 17 g packet Take 17 g by mouth daily 14 each 0    venlafaxine (EFFEXOR-XR) 150 mg 24 hr capsule Take 1 capsule by mouth in the morning  90 capsule 1    venlafaxine (EFFEXOR-XR) 75 mg 24 hr capsule Take 1 capsule by mouth in the evening  90 capsule 1     No current facility-administered medications for this visit        Allergies   Allergen Reactions    Vancomycin Hives    Penicillins Hives and Rash      Immunizations:     Immunization History   Administered Date(s) Administered    Pneumococcal Conjugate 13-Valent 08/24/2016    Pneumococcal Polysaccharide PPV23 03/21/2017    Tdap 12/13/2011    Zoster 09/18/2016      Health Maintenance:         Topic Date Due    DXA SCAN  1955    Cervical Cancer Screening  12/13/1974    MAMMOGRAM  10/16/2003    Hepatitis C Screening  Completed     There are no preventive care reminders to display for this patient  Medicare Health Risk Assessment:     /68   Pulse 86   Temp 98 5 °F (36 9 °C)   Ht 5' 3" (1 6 m)   Wt 68 kg (150 lb)   BMI 26 57 kg/m²      Julissa Hamm is here for her Subsequent Wellness visit  Health Risk Assessment:   Patient rates overall health as good  Patient feels that their physical health rating is slightly worse  Eyesight was rated as same  Hearing was rated as same  Patient feels that their emotional and mental health rating is same  Pain experienced in the last 7 days has been some  Patient's pain rating has been 5/10  Patient states that she has experienced no weight loss or gain in last 6 months  L low back  And L leg pain    Depression Screening:   PHQ-2 Score: 0  PHQ-9 Score: 0      Fall Risk Screening: In the past year, patient has experienced: history of falling in past year    Number of falls: 1  Injured during fall?: Yes    Feels unsteady when standing or walking?: Yes    Worried about falling?: Yes      Urinary Incontinence Screening:   Patient has not leaked urine accidently in the last six months  Home Safety:  Patient does not have trouble with stairs inside or outside of their home  Patient has working smoke alarms and has no working carbon monoxide detector  Home safety hazards include: none  Nutrition:   Current diet is Regular  Medications:   Patient is currently taking over-the-counter supplements  OTC medications include: see medication list  Patient is able to manage medications  Activities of Daily Living (ADLs)/Instrumental Activities of Daily Living (IADLs):   Walk and transfer into and out of bed and chair?: Yes  Dress and groom yourself?: Yes    Bathe or shower yourself?: Yes    Feed yourself?  Yes  Do your laundry/housekeeping?: Yes  Manage your money, pay your bills and track your expenses?: Yes  Make your own meals?: Yes    Do your own shopping?: Yes    Previous Hospitalizations:   Any hospitalizations or ED visits within the last 12 months?: Yes    How many hospitalizations have you had in the last year?: 1-2    Hospitalization Comments: L femur fx    Advance Care Planning:   Living will: Yes    Durable POA for healthcare: Yes    Advanced directive: Yes    Advanced directive counseling given: Yes      Cognitive Screening:   Provider or family/friend/caregiver concerned regarding cognition?: No    PREVENTIVE SCREENINGS      Cardiovascular Screening:    General: Screening Not Indicated and History Lipid Disorder      Diabetes Screening:     General: Screening Current      Colorectal Cancer Screening:     General: Screening Current      Breast Cancer Screening:     General: Patient Declines      Cervical Cancer Screening:    General: Screening Not Indicated      Osteoporosis Screening:    General: Risks and Benefits Discussed    Due for: DXA Appendicular      Abdominal Aortic Aneurysm (AAA) Screening:        General: Screening Not Indicated      Lung Cancer Screening:     General: Screening Not Indicated      Hepatitis C Screening:    General: Screening Current    Other Counseling Topics:   Calcium and vitamin D intake and regular weightbearing exercise         Jose Morales MD

## 2020-08-30 PROBLEM — F33.41 RECURRENT MAJOR DEPRESSIVE DISORDER, IN PARTIAL REMISSION (HCC): Status: ACTIVE | Noted: 2017-10-31

## 2020-08-30 NOTE — ASSESSMENT & PLAN NOTE
Still with hip and femur pain  Unclear if this is related to back, fx or both   F/u with pain management and ortho

## 2020-08-30 NOTE — ASSESSMENT & PLAN NOTE
Check labs  Monitor diet  BMI Counseling: Body mass index is 26 57 kg/m²  The BMI is above normal  Nutrition recommendations include reducing portion sizes, decreasing overall calorie intake, reducing intake of saturated fat and trans fat and reducing intake of cholesterol    Recheck 6m

## 2020-08-30 NOTE — ASSESSMENT & PLAN NOTE
PHQ-2 = 0 though pt appears somewhat down  She denies worsening depression however  Continue present care   Recheck 6m - earlier if worse

## 2020-09-06 DIAGNOSIS — F32.A DEPRESSION, UNSPECIFIED DEPRESSION TYPE: ICD-10-CM

## 2020-09-06 DIAGNOSIS — F41.9 ANXIETY: ICD-10-CM

## 2020-09-06 DIAGNOSIS — G89.4 CHRONIC PAIN SYNDROME: ICD-10-CM

## 2020-09-06 DIAGNOSIS — E78.00 HYPERCHOLESTEROLEMIA: ICD-10-CM

## 2020-09-07 RX ORDER — GABAPENTIN 300 MG/1
300 CAPSULE ORAL 2 TIMES DAILY
Qty: 180 CAPSULE | Refills: 0 | Status: SHIPPED | OUTPATIENT
Start: 2020-09-07 | End: 2020-12-05

## 2020-09-07 RX ORDER — VENLAFAXINE HYDROCHLORIDE 75 MG/1
CAPSULE, EXTENDED RELEASE ORAL
Qty: 90 CAPSULE | Refills: 1 | Status: SHIPPED | OUTPATIENT
Start: 2020-09-07 | End: 2021-01-20 | Stop reason: SDUPTHER

## 2020-09-07 RX ORDER — CLONAZEPAM 0.5 MG/1
0.5 TABLET ORAL
Qty: 30 TABLET | Refills: 0 | Status: SHIPPED | OUTPATIENT
Start: 2020-09-07 | End: 2020-10-05

## 2020-09-07 RX ORDER — VENLAFAXINE HYDROCHLORIDE 150 MG/1
CAPSULE, EXTENDED RELEASE ORAL
Qty: 90 CAPSULE | Refills: 1 | Status: SHIPPED | OUTPATIENT
Start: 2020-09-07 | End: 2021-01-20 | Stop reason: SDUPTHER

## 2020-09-07 RX ORDER — ATORVASTATIN CALCIUM 20 MG/1
TABLET, FILM COATED ORAL
Qty: 90 TABLET | Refills: 3 | Status: SHIPPED | OUTPATIENT
Start: 2020-09-07 | End: 2021-01-20 | Stop reason: SDUPTHER

## 2020-09-09 DIAGNOSIS — M17.0 OSTEOARTHRITIS OF BOTH KNEES, UNSPECIFIED OSTEOARTHRITIS TYPE: ICD-10-CM

## 2020-09-09 RX ORDER — MELOXICAM 7.5 MG/1
TABLET ORAL
Qty: 30 TABLET | Refills: 0 | Status: SHIPPED | OUTPATIENT
Start: 2020-09-09 | End: 2020-09-15

## 2020-09-15 ENCOUNTER — OFFICE VISIT (OUTPATIENT)
Dept: PAIN MEDICINE | Facility: CLINIC | Age: 65
End: 2020-09-15
Payer: COMMERCIAL

## 2020-09-15 ENCOUNTER — TRANSCRIBE ORDERS (OUTPATIENT)
Dept: PAIN MEDICINE | Facility: CLINIC | Age: 65
End: 2020-09-15

## 2020-09-15 VITALS
DIASTOLIC BLOOD PRESSURE: 73 MMHG | HEART RATE: 87 BPM | TEMPERATURE: 98.5 F | WEIGHT: 156 LBS | HEIGHT: 63 IN | BODY MASS INDEX: 27.64 KG/M2 | SYSTOLIC BLOOD PRESSURE: 131 MMHG

## 2020-09-15 DIAGNOSIS — M70.62 TROCHANTERIC BURSITIS OF LEFT HIP: ICD-10-CM

## 2020-09-15 DIAGNOSIS — M51.16 LUMBAR DISC DISEASE WITH RADICULOPATHY: Primary | ICD-10-CM

## 2020-09-15 PROBLEM — R19.7 DIARRHEA: Status: ACTIVE | Noted: 2020-09-15

## 2020-09-15 PROBLEM — Z12.12 SCREENING FOR COLORECTAL CANCER: Status: ACTIVE | Noted: 2020-09-15

## 2020-09-15 PROBLEM — Z12.11 SCREENING FOR COLORECTAL CANCER: Status: ACTIVE | Noted: 2020-09-15

## 2020-09-15 PROCEDURE — 1036F TOBACCO NON-USER: CPT | Performed by: PHYSICAL MEDICINE & REHABILITATION

## 2020-09-15 PROCEDURE — 1160F RVW MEDS BY RX/DR IN RCRD: CPT | Performed by: PHYSICAL MEDICINE & REHABILITATION

## 2020-09-15 PROCEDURE — 99214 OFFICE O/P EST MOD 30 MIN: CPT | Performed by: PHYSICAL MEDICINE & REHABILITATION

## 2020-09-15 RX ORDER — CELECOXIB 200 MG/1
200 CAPSULE ORAL DAILY
Qty: 30 CAPSULE | Refills: 2 | Status: SHIPPED | OUTPATIENT
Start: 2020-09-15 | End: 2020-11-02

## 2020-09-15 NOTE — PATIENT INSTRUCTIONS
Celecoxib (By mouth)   Celecoxib ([id number])  Treats pain  This medicine is an NSAID  Brand Name(s): CeleBREX, SmartRx CapXib Kit   There may be other brand names for this medicine  When This Medicine Should Not Be Used: This medicine is not right for everyone  Do not use it if you had an allergic reaction (including asthma) to celecoxib, aspirin, NSAIDs, or a sulfa drug (such as sulfamethoxazole)  Do not use this medicine right before or right after coronary artery bypass graft (CABG)  How to Use This Medicine:   Capsule  · Your doctor will tell you how much medicine to use  Do not use more than directed  · It is best to take this medicine with food or milk so it does not upset your stomach  · Use this medicine for the shortest time possible and in the smallest dose possible  This will help lower the risk of side effects  · If you cannot swallow the capsule, you may open it and pour the medicine into a teaspoon of applesauce  Stir the mixture well and swallow right away  Drink enough water to make sure you swallow all of the medicine  · This medicine should come with a Medication Guide  Ask your pharmacist for a copy if you do not have one  · Missed dose: Take a dose as soon as you remember  If it is almost time for your next dose, wait until then and take a regular dose  Do not take extra medicine to make up for a missed dose  · Store the medicine in a closed container at room temperature, away from heat, moisture, and direct light  Any medicine that has been mixed with applesauce may be stored in a refrigerator and used within 6 hours  Drugs and Foods to Avoid:   Ask your doctor or pharmacist before using any other medicine, including over-the-counter medicines, vitamins, and herbal products  · Some medicines and foods can affect how celecoxib works   Tell your doctor if you are taking any of the following:   ¨ A blood thinner, such as warfarin  ¨ A diuretic (water pill), such as furosemide, hydrochlorothiazide (HCTZ), torsemide  ¨ Blood pressure medicine, such as enalapril, lisinopril, losartan, olmesartan, valsartan  ¨ Fluconazole  ¨ Lithium  ¨ Other pain or arthritis medicine, such as aspirin, diclofenac, ibuprofen, naproxen  ¨ Steroid medicine, such as hydrocortisone, methylprednisolone, prednisone  · Do not drink alcohol while you are using this medicine  Warnings While Using This Medicine:   · Tell your doctor if you are pregnant or breastfeeding  Do not use this medicine during the later part of pregnancy, unless your doctor tells you to  · Tell your doctor if you have a history of ulcers or other stomach problems, kidney or liver disease, anemia, aspirin-sensitive asthma, high blood pressure, congestive heart failure, or other heart or circulation problems  · This medicine may cause the following problems:   ¨ A serious liver problem  ¨ Bleeding in your stomach or intestines  ¨ Increased risk for a heart attack or stroke  ¨ Risk for disseminated intravascular coagulation (bleeding problem) in children younger than 18 years  · Tell any doctor or dentist who treats you that you are using this medicine  · Your doctor will do lab tests at regular visits to check on the effects of this medicine  Keep all appointments  · Keep all medicine out of the reach of children  Never share your medicine with anyone    Possible Side Effects While Using This Medicine:   Call your doctor right away if you notice any of these side effects:  · Allergic reaction: Itching or hives, swelling in your face or hands, swelling or tingling in your mouth or throat, chest tightness, trouble breathing  · Blistering, peeling, red skin rash  · Bloody or black, tarry stools  · Change in how much or how often you urinate, bloody or cloudy urine  · Chest pain, shortness of breath, or coughing up blood  · Fast or slow heartbeat  · Dark urine or pale stools, nausea, vomiting, loss of appetite, stomach pain, yellow skin or eyes  · Numbness or weakness in your arm or leg, or on one side of your body  · Pain in your calf  · Shortness of breath, cold sweat, and bluish skin  · Sudden or severe headache, dizziness, or problems with vision, speech, or walking  · Swelling in your hands, ankles, or feet, rapid weight gain  · Unusual tiredness or weakness, pale skin  · Vomiting blood or material that looks like coffee grounds  If you notice these less serious side effects, talk with your doctor:   · Mild skin rash  · Muscle or joint pain  If you notice other side effects that you think are caused by this medicine, tell your doctor  Call your doctor for medical advice about side effects  You may report side effects to FDA at 1-032-FDA-1082  © 2017 [address] [employer] Information is for End User's use only and may not be sold, redistributed or otherwise used for commercial purposes  The above information is an  only  It is not intended as medical advice for individual conditions or treatments  Talk to your doctor, nurse or pharmacist before following any medical regimen to see if it is safe and effective for you

## 2020-09-15 NOTE — PROGRESS NOTES
Assessment  1  Lumbar disc disease with radiculopathy    2  Trochanteric bursitis of left hip        Plan  Ms Magill is a pleasant 77-year-old female who presents for follow-up and re-evaluation regarding left-sided low back pain with radicular symptoms into the left hip and thigh  We previously performed a left-sided L3-L4 and L4-L5 TFESI on July 22, 2020 which the patient reported several days of relief in the pain has gradually returned to previous level  She does not wish to repeat the injection due to pain during the procedure and does not wish to trial interventional approaches at this time  She has already tried physical therapy with minimal relief and plan to continue with her home exercises  Meloxicam is providing no significant improvements in her pain  At this time we will make adjustments to her medications with discontinue meloxicam and transitioning to Celebrex 200 mg daily  Risks and benefits of medication have been discussed in detail  All questions answered, we will follow up in 4-6 weeks or sooner if needed    My impressions and treatment recommendations were discussed in detail with the patient who verbalized understanding and had no further questions  Discharge instructions were provided  I personally saw and examined the patient and I agree with the above discussed plan of care  No orders of the defined types were placed in this encounter  New Medications Ordered This Visit   Medications    celecoxib (CeleBREX) 200 mg capsule     Sig: Take 1 capsule (200 mg total) by mouth daily     Dispense:  30 capsule     Refill:  2       History of Present Illness    Kristina Gilman is a 75 y o  female presents to [place] [address] [facility] for follow-up and re-evaluation regarding left-sided low back pain with intermittent radiating symptoms into the left leg    Currently reporting 7/10 pain that is worse in the morning and evening described as a constant sharp, cramping, shooting pain   We previously performed a left-sided L3-L4 and L4-L5 TFESI which he reports minimal improvements in her pain  Presents for follow-up    I have personally reviewed and/or updated the patient's past medical history, past surgical history, family history, social history, current medications, allergies, and vital signs today  Review of Systems   Constitutional: Negative for fever and unexpected weight change  HENT: Negative for trouble swallowing  Eyes: Negative for visual disturbance  Respiratory: Negative for shortness of breath and wheezing  Cardiovascular: Negative for chest pain and palpitations  Gastrointestinal: Negative for constipation, diarrhea, nausea and vomiting  Endocrine: Negative for cold intolerance, heat intolerance and polydipsia  Genitourinary: Negative for difficulty urinating and frequency  Musculoskeletal: Positive for joint swelling  Negative for arthralgias, gait problem and myalgias  Pain in extremity   Skin: Negative for rash  Neurological: Negative for dizziness, seizures, syncope, weakness and headaches  Hematological: Does not bruise/bleed easily  Psychiatric/Behavioral: Negative for dysphoric mood  All other systems reviewed and are negative        Patient Active Problem List   Diagnosis    Allergic rhinitis    Anxiety    Elevated TSH    Fatigue    Gamekeeper's thumb of right hand    Gastroesophageal reflux disease    Headache    Imbalance    Lower back pain    Recurrent major depressive disorder, [facility] partial remission [facility] )    Osteoarthritis    Closed displaced basicervical fracture of left femur ([facility] )    Other hyperlipidemia    Preoperative examination    Intertrochanteric fracture of femur (HCC)    Anemia    Status post left femoral intramedullary nail    Aftercare following surgery    Trochanteric bursitis of left hip    Lumbar disc disease with radiculopathy       Past Medical History:   Diagnosis Date    Anxiety     Depression     Fractures     GERD (gastroesophageal reflux disease)     Low back pain     Panic attacks     Psychiatric disorder     anxiety, depression       Past Surgical History:   Procedure Laterality Date    BLADDER SUSPENSION      Mesh    CHOLECYSTECTOMY      COLONOSCOPY      GALLBLADDER SURGERY      NV LAP,CHOLECYSTECTOMY N/A 2016    Procedure: LAPAROSCOPIC CHOLECYSTECTOMY ;  Surgeon: Sara Grullon DO;  [facility];  Service: General  Last assessed: 16    NV OPEN RX FEMUR FX+INTRAMED NEMO Left 3/1/2020    Procedure: INSERTION NAIL IM FEMUR ANTEGRADE (TROCHANTERIC); Surgeon: Karl Ernst MD;  [facility]: [facility];  Service: Orthopedics    [address]         Family History   Problem Relation Age of Onset    Other Mother         Methicillin Resistant Staphylococcus Aureus Infection    Mental illness Mother     Alzheimer's disease Mother     Lung cancer Father     Alzheimer's disease Other        Social History     Occupational History    Occupation: Retired   Tobacco Use    Smoking status: Former Smoker     Packs/day: 0 00     Years: 15 00     Pack years: 0 00     Types: Cigarettes     Last attempt to quit: 2019     Years since quittin 4    Smokeless tobacco: Never Used   Substance and Sexual Activity    Alcohol use: Never     Alcohol/week: 0 0 standard drinks     Frequency: Never     Drinks per session: Patient refused     Binge frequency: Never    Drug use: No    Sexual activity: Not on file       Current Outpatient Medications on File Prior to Visit   Medication Sig    acetaminophen (TYLENOL) 650 mg CR tablet Take 1 tablet (650 mg total) by mouth every 8 (eight) hours as needed for mild pain    atorvastatin (LIPITOR) 20 mg tablet Take 1 tablet by mouth daily      benzonatate (TESSALON) 200 MG capsule Take 1 capsule (200 mg total) by mouth 3 (three) times a day as needed for cough (Patient not taking: Reported on 5/22/2020)    Calcium 600 MG tablet Take 600 mg by mouth daily     Cholecalciferol (VITAMIN D3) 1000 units CAPS Take 1,000 Units by mouth daily     clonazePAM (KlonoPIN) 0 5 mg tablet Take 1 tablet (0 5 mg total) by mouth daily at bedtime    cyanocobalamin 1000 MCG tablet Take 1,000 mcg by mouth daily     fluticasone (FLONASE) 50 mcg/act nasal spray 2 sprays into each nostril daily    gabapentin (NEURONTIN) 300 mg capsule Take 1 capsule (300 mg total) by mouth 2 (two) times a day    loratadine (CLARITIN) 10 mg tablet Take 1 tablet by mouth daily   Magnesium Oxide -Mg Supplement 400 MG CAPS Take 1 capsule by mouth daily    Melatonin 1 MG CAPS Take by mouth    methocarbamol (ROBAXIN) 750 mg tablet Take 1 tablet (750 mg total) by mouth every 6 (six) hours for 7 days    Multiple Vitamin (MULTI-VITAMIN DAILY) TABS Take by mouth    omeprazole (PriLOSEC) 40 MG capsule Take 1 capsule by mouth daily   polyethylene glycol (MIRALAX) 17 g packet Take 17 g by mouth daily    venlafaxine (EFFEXOR-XR) 150 mg 24 hr capsule Take 1 capsule by mouth in the morning   venlafaxine (EFFEXOR-XR) 75 mg 24 hr capsule Take 1 capsule by mouth in the evening   [DISCONTINUED] meloxicam (MOBIC) 7 5 mg tablet Take 1 tablet by mouth daily  No current facility-administered medications on file prior to visit  Allergies   Allergen Reactions    Vancomycin Hives    Penicillins Hives and Rash       Physical Exam    /73   Pulse 87   Temp 98 5 °F (36 9 °C) (Temporal)   Ht 5' 3" (1 6 m)   Wt 70 8 kg (156 lb)   BMI 27 63 kg/m²     Constitutional: normal, well developed, well nourished, alert, in no distress and non-toxic and no overt pain behavior    Eyes: anicteric  HEENT: grossly intact  Neck: supple, symmetric, trachea midline and no masses   Pulmonary:even and unlabored  Cardiovascular:No edema or pitting edema present  Skin:Normal without rashes or lesions and well hydrated  Psychiatric:Mood and affect appropriate  Neurologic:Cranial Nerves II-XII grossly intact  Musculoskeletal:antalgic    Imaging

## 2020-09-28 ENCOUNTER — TELEPHONE (OUTPATIENT)
Dept: PAIN MEDICINE | Facility: CLINIC | Age: 65
End: 2020-09-28

## 2020-10-03 DIAGNOSIS — F41.9 ANXIETY: ICD-10-CM

## 2020-10-05 RX ORDER — CLONAZEPAM 0.5 MG/1
0.5 TABLET ORAL
Qty: 30 TABLET | Refills: 1 | Status: SHIPPED | OUTPATIENT
Start: 2020-10-05 | End: 2020-11-01

## 2020-10-21 ENCOUNTER — TELEPHONE (OUTPATIENT)
Dept: PAIN MEDICINE | Facility: MEDICAL CENTER | Age: 65
End: 2020-10-21

## 2020-10-27 ENCOUNTER — TRANSCRIBE ORDERS (OUTPATIENT)
Dept: PAIN MEDICINE | Facility: CLINIC | Age: 65
End: 2020-10-27

## 2020-10-27 ENCOUNTER — OFFICE VISIT (OUTPATIENT)
Dept: PAIN MEDICINE | Facility: CLINIC | Age: 65
End: 2020-10-27
Payer: COMMERCIAL

## 2020-10-27 VITALS
HEART RATE: 77 BPM | SYSTOLIC BLOOD PRESSURE: 112 MMHG | TEMPERATURE: 97.8 F | WEIGHT: 155 LBS | BODY MASS INDEX: 27.46 KG/M2 | DIASTOLIC BLOOD PRESSURE: 67 MMHG

## 2020-10-27 DIAGNOSIS — M51.16 LUMBAR DISC DISEASE WITH RADICULOPATHY: ICD-10-CM

## 2020-10-27 DIAGNOSIS — Z87.81 STATUS POST OPEN REDUCTION WITH INTERNAL FIXATION OF FRACTURE: Primary | ICD-10-CM

## 2020-10-27 DIAGNOSIS — Z98.890 STATUS POST OPEN REDUCTION WITH INTERNAL FIXATION OF FRACTURE: Primary | ICD-10-CM

## 2020-10-27 DIAGNOSIS — M70.62 TROCHANTERIC BURSITIS OF LEFT HIP: ICD-10-CM

## 2020-10-27 PROCEDURE — 99214 OFFICE O/P EST MOD 30 MIN: CPT | Performed by: PHYSICAL MEDICINE & REHABILITATION

## 2020-10-27 RX ORDER — LIDOCAINE 50 MG/G
1 PATCH TOPICAL DAILY
Qty: 30 PATCH | Refills: 0 | Status: SHIPPED | OUTPATIENT
Start: 2020-10-27

## 2020-11-01 DIAGNOSIS — M51.16 LUMBAR DISC DISEASE WITH RADICULOPATHY: ICD-10-CM

## 2020-11-01 DIAGNOSIS — F41.9 ANXIETY: ICD-10-CM

## 2020-11-01 DIAGNOSIS — M70.62 TROCHANTERIC BURSITIS OF LEFT HIP: ICD-10-CM

## 2020-11-01 DIAGNOSIS — K21.9 GASTROESOPHAGEAL REFLUX DISEASE: ICD-10-CM

## 2020-11-01 RX ORDER — CLONAZEPAM 0.5 MG/1
TABLET ORAL
Qty: 30 TABLET | Refills: 0 | Status: SHIPPED | OUTPATIENT
Start: 2020-11-01 | End: 2021-02-05 | Stop reason: SDUPTHER

## 2020-11-01 RX ORDER — OMEPRAZOLE 40 MG/1
CAPSULE, DELAYED RELEASE ORAL
Qty: 30 CAPSULE | Refills: 1 | Status: SHIPPED | OUTPATIENT
Start: 2020-11-01 | End: 2021-01-04

## 2020-11-02 ENCOUNTER — TELEPHONE (OUTPATIENT)
Dept: PAIN MEDICINE | Facility: CLINIC | Age: 65
End: 2020-11-02

## 2020-11-02 RX ORDER — CELECOXIB 200 MG/1
CAPSULE ORAL
Qty: 30 CAPSULE | Refills: 1 | Status: SHIPPED | OUTPATIENT
Start: 2020-11-02 | End: 2020-12-07

## 2020-11-11 ENCOUNTER — ANESTHESIA EVENT (OUTPATIENT)
Dept: GASTROENTEROLOGY | Facility: AMBULARY SURGERY CENTER | Age: 65
End: 2020-11-11

## 2020-11-12 ENCOUNTER — VBI (OUTPATIENT)
Dept: ADMINISTRATIVE | Facility: OTHER | Age: 65
End: 2020-11-12

## 2020-11-18 DIAGNOSIS — Z11.59 SPECIAL SCREENING EXAMINATION FOR UNSPECIFIED VIRAL DISEASE: ICD-10-CM

## 2020-11-18 PROCEDURE — U0003 INFECTIOUS AGENT DETECTION BY NUCLEIC ACID (DNA OR RNA); SEVERE ACUTE RESPIRATORY SYNDROME CORONAVIRUS 2 (SARS-COV-2) (CORONAVIRUS DISEASE [COVID-19]), AMPLIFIED PROBE TECHNIQUE, MAKING USE OF HIGH THROUGHPUT TECHNOLOGIES AS DESCRIBED BY CMS-2020-01-R: HCPCS | Performed by: COLON & RECTAL SURGERY

## 2020-11-19 LAB — SARS-COV-2 RNA SPEC QL NAA+PROBE: NOT DETECTED

## 2020-11-20 ENCOUNTER — OFFICE VISIT (OUTPATIENT)
Dept: FAMILY MEDICINE CLINIC | Facility: CLINIC | Age: 65
End: 2020-11-20
Payer: COMMERCIAL

## 2020-11-20 ENCOUNTER — LAB (OUTPATIENT)
Dept: LAB | Facility: CLINIC | Age: 65
End: 2020-11-20
Payer: COMMERCIAL

## 2020-11-20 VITALS
DIASTOLIC BLOOD PRESSURE: 68 MMHG | BODY MASS INDEX: 27.64 KG/M2 | HEIGHT: 63 IN | SYSTOLIC BLOOD PRESSURE: 120 MMHG | HEART RATE: 80 BPM | TEMPERATURE: 97.9 F | WEIGHT: 156 LBS

## 2020-11-20 DIAGNOSIS — F41.9 ANXIETY: ICD-10-CM

## 2020-11-20 DIAGNOSIS — M51.16 LUMBAR DISC DISEASE WITH RADICULOPATHY: Primary | ICD-10-CM

## 2020-11-20 DIAGNOSIS — D64.9 ANEMIA, UNSPECIFIED TYPE: ICD-10-CM

## 2020-11-20 DIAGNOSIS — Z12.31 ENCOUNTER FOR SCREENING MAMMOGRAM FOR BREAST CANCER: ICD-10-CM

## 2020-11-20 DIAGNOSIS — E78.49 OTHER HYPERLIPIDEMIA: ICD-10-CM

## 2020-11-20 DIAGNOSIS — F33.0 MILD EPISODE OF RECURRENT MAJOR DEPRESSIVE DISORDER (HCC): ICD-10-CM

## 2020-11-20 DIAGNOSIS — Z78.0 ASYMPTOMATIC POSTMENOPAUSAL STATE: ICD-10-CM

## 2020-11-20 DIAGNOSIS — K21.9 GASTROESOPHAGEAL REFLUX DISEASE WITHOUT ESOPHAGITIS: ICD-10-CM

## 2020-11-20 LAB
ALBUMIN SERPL BCP-MCNC: 3.7 G/DL (ref 3.5–5)
ALP SERPL-CCNC: 142 U/L (ref 46–116)
ALT SERPL W P-5'-P-CCNC: 13 U/L (ref 12–78)
ANION GAP SERPL CALCULATED.3IONS-SCNC: 5 MMOL/L (ref 4–13)
AST SERPL W P-5'-P-CCNC: 26 U/L (ref 5–45)
BASOPHILS # BLD AUTO: 0.05 THOUSANDS/ΜL (ref 0–0.1)
BASOPHILS NFR BLD AUTO: 1 % (ref 0–1)
BILIRUB SERPL-MCNC: 0.25 MG/DL (ref 0.2–1)
BUN SERPL-MCNC: 12 MG/DL (ref 5–25)
CALCIUM SERPL-MCNC: 9.8 MG/DL (ref 8.3–10.1)
CHLORIDE SERPL-SCNC: 109 MMOL/L (ref 100–108)
CHOLEST SERPL-MCNC: 218 MG/DL (ref 50–200)
CO2 SERPL-SCNC: 26 MMOL/L (ref 21–32)
CREAT SERPL-MCNC: 0.92 MG/DL (ref 0.6–1.3)
EOSINOPHIL # BLD AUTO: 0.1 THOUSAND/ΜL (ref 0–0.61)
EOSINOPHIL NFR BLD AUTO: 1 % (ref 0–6)
ERYTHROCYTE [DISTWIDTH] IN BLOOD BY AUTOMATED COUNT: 13.4 % (ref 11.6–15.1)
GFR SERPL CREATININE-BSD FRML MDRD: 65 ML/MIN/1.73SQ M
GLUCOSE P FAST SERPL-MCNC: 83 MG/DL (ref 65–99)
HCT VFR BLD AUTO: 43.1 % (ref 34.8–46.1)
HDLC SERPL-MCNC: 46 MG/DL
HGB BLD-MCNC: 13.3 G/DL (ref 11.5–15.4)
IMM GRANULOCYTES # BLD AUTO: 0.02 THOUSAND/UL (ref 0–0.2)
IMM GRANULOCYTES NFR BLD AUTO: 0 % (ref 0–2)
LYMPHOCYTES # BLD AUTO: 2.99 THOUSANDS/ΜL (ref 0.6–4.47)
LYMPHOCYTES NFR BLD AUTO: 38 % (ref 14–44)
MCH RBC QN AUTO: 30.1 PG (ref 26.8–34.3)
MCHC RBC AUTO-ENTMCNC: 30.9 G/DL (ref 31.4–37.4)
MCV RBC AUTO: 98 FL (ref 82–98)
MONOCYTES # BLD AUTO: 0.62 THOUSAND/ΜL (ref 0.17–1.22)
MONOCYTES NFR BLD AUTO: 8 % (ref 4–12)
NEUTROPHILS # BLD AUTO: 4.1 THOUSANDS/ΜL (ref 1.85–7.62)
NEUTS SEG NFR BLD AUTO: 52 % (ref 43–75)
NONHDLC SERPL-MCNC: 172 MG/DL
NRBC BLD AUTO-RTO: 0 /100 WBCS
PLATELET # BLD AUTO: 452 THOUSANDS/UL (ref 149–390)
PMV BLD AUTO: 10.9 FL (ref 8.9–12.7)
POTASSIUM SERPL-SCNC: 4.9 MMOL/L (ref 3.5–5.3)
PROT SERPL-MCNC: 7.7 G/DL (ref 6.4–8.2)
RBC # BLD AUTO: 4.42 MILLION/UL (ref 3.81–5.12)
SODIUM SERPL-SCNC: 140 MMOL/L (ref 136–145)
TRIGL SERPL-MCNC: 449 MG/DL
TSH SERPL DL<=0.05 MIU/L-ACNC: 2.02 UIU/ML (ref 0.36–3.74)
WBC # BLD AUTO: 7.88 THOUSAND/UL (ref 4.31–10.16)

## 2020-11-20 PROCEDURE — 84443 ASSAY THYROID STIM HORMONE: CPT

## 2020-11-20 PROCEDURE — 36415 COLL VENOUS BLD VENIPUNCTURE: CPT

## 2020-11-20 PROCEDURE — 3008F BODY MASS INDEX DOCD: CPT | Performed by: FAMILY MEDICINE

## 2020-11-20 PROCEDURE — 80053 COMPREHEN METABOLIC PANEL: CPT

## 2020-11-20 PROCEDURE — 3725F SCREEN DEPRESSION PERFORMED: CPT | Performed by: FAMILY MEDICINE

## 2020-11-20 PROCEDURE — 99214 OFFICE O/P EST MOD 30 MIN: CPT | Performed by: FAMILY MEDICINE

## 2020-11-20 PROCEDURE — 1160F RVW MEDS BY RX/DR IN RCRD: CPT | Performed by: FAMILY MEDICINE

## 2020-11-20 PROCEDURE — 85025 COMPLETE CBC W/AUTO DIFF WBC: CPT

## 2020-11-20 PROCEDURE — 80061 LIPID PANEL: CPT

## 2020-11-20 PROCEDURE — 1036F TOBACCO NON-USER: CPT | Performed by: FAMILY MEDICINE

## 2020-11-22 DIAGNOSIS — E78.49 OTHER HYPERLIPIDEMIA: Primary | ICD-10-CM

## 2020-11-22 PROBLEM — S53.31XA GAMEKEEPER'S THUMB OF RIGHT HAND: Status: RESOLVED | Noted: 2017-04-11 | Resolved: 2020-11-22

## 2020-11-22 PROBLEM — F33.0 MILD EPISODE OF RECURRENT MAJOR DEPRESSIVE DISORDER (HCC): Status: ACTIVE | Noted: 2017-10-31

## 2020-11-22 PROBLEM — S63.641A GAMEKEEPER'S THUMB OF RIGHT HAND: Status: RESOLVED | Noted: 2017-04-11 | Resolved: 2020-11-22

## 2020-11-25 ENCOUNTER — ANESTHESIA (OUTPATIENT)
Dept: GASTROENTEROLOGY | Facility: AMBULARY SURGERY CENTER | Age: 65
End: 2020-11-25

## 2020-11-25 ENCOUNTER — HOSPITAL ENCOUNTER (OUTPATIENT)
Dept: GASTROENTEROLOGY | Facility: AMBULARY SURGERY CENTER | Age: 65
Setting detail: OUTPATIENT SURGERY
Discharge: HOME | End: 2020-11-25
Attending: COLON & RECTAL SURGERY | Admitting: COLON & RECTAL SURGERY
Payer: COMMERCIAL

## 2020-11-25 VITALS
DIASTOLIC BLOOD PRESSURE: 56 MMHG | TEMPERATURE: 97.5 F | BODY MASS INDEX: 26.93 KG/M2 | WEIGHT: 152 LBS | SYSTOLIC BLOOD PRESSURE: 114 MMHG | RESPIRATION RATE: 18 BRPM | HEART RATE: 83 BPM | OXYGEN SATURATION: 100 % | HEIGHT: 63 IN

## 2020-11-25 VITALS — HEART RATE: 84 BPM

## 2020-11-25 DIAGNOSIS — R19.7 DIARRHEA, UNSPECIFIED TYPE: ICD-10-CM

## 2020-11-25 DIAGNOSIS — Z86.010 PERSONAL HISTORY OF COLONIC POLYPS: ICD-10-CM

## 2020-11-25 PROCEDURE — 88305 TISSUE EXAM BY PATHOLOGIST: CPT | Performed by: PATHOLOGY

## 2020-11-25 PROCEDURE — 88342 IMHCHEM/IMCYTCHM 1ST ANTB: CPT | Performed by: PATHOLOGY

## 2020-11-25 PROCEDURE — 88341 IMHCHEM/IMCYTCHM EA ADD ANTB: CPT | Performed by: PATHOLOGY

## 2020-11-25 PROCEDURE — 45380 COLONOSCOPY AND BIOPSY: CPT | Performed by: COLON & RECTAL SURGERY

## 2020-11-25 RX ORDER — LIDOCAINE HYDROCHLORIDE 10 MG/ML
INJECTION, SOLUTION EPIDURAL; INFILTRATION; INTRACAUDAL; PERINEURAL AS NEEDED
Status: DISCONTINUED | OUTPATIENT
Start: 2020-11-25 | End: 2020-11-25

## 2020-11-25 RX ORDER — PROPOFOL 10 MG/ML
INJECTION, EMULSION INTRAVENOUS AS NEEDED
Status: DISCONTINUED | OUTPATIENT
Start: 2020-11-25 | End: 2020-11-25

## 2020-11-25 RX ORDER — SODIUM CHLORIDE, SODIUM LACTATE, POTASSIUM CHLORIDE, CALCIUM CHLORIDE 600; 310; 30; 20 MG/100ML; MG/100ML; MG/100ML; MG/100ML
125 INJECTION, SOLUTION INTRAVENOUS CONTINUOUS
Status: DISCONTINUED | OUTPATIENT
Start: 2020-11-25 | End: 2020-11-29 | Stop reason: HOSPADM

## 2020-11-25 RX ADMIN — PROPOFOL 100 MG: 10 INJECTION, EMULSION INTRAVENOUS at 11:11

## 2020-11-25 RX ADMIN — PROPOFOL 20 MG: 10 INJECTION, EMULSION INTRAVENOUS at 11:18

## 2020-11-25 RX ADMIN — PROPOFOL 20 MG: 10 INJECTION, EMULSION INTRAVENOUS at 11:22

## 2020-11-25 RX ADMIN — PROPOFOL 20 MG: 10 INJECTION, EMULSION INTRAVENOUS at 11:25

## 2020-11-25 RX ADMIN — SODIUM CHLORIDE, SODIUM LACTATE, POTASSIUM CHLORIDE, AND CALCIUM CHLORIDE: .6; .31; .03; .02 INJECTION, SOLUTION INTRAVENOUS at 11:04

## 2020-11-25 RX ADMIN — PROPOFOL 30 MG: 10 INJECTION, EMULSION INTRAVENOUS at 11:14

## 2020-11-25 RX ADMIN — LIDOCAINE HYDROCHLORIDE 30 MG: 10 INJECTION, SOLUTION EPIDURAL; INFILTRATION; INTRACAUDAL at 11:11

## 2020-12-05 DIAGNOSIS — M51.16 LUMBAR DISC DISEASE WITH RADICULOPATHY: ICD-10-CM

## 2020-12-05 DIAGNOSIS — G89.4 CHRONIC PAIN SYNDROME: ICD-10-CM

## 2020-12-05 DIAGNOSIS — M70.62 TROCHANTERIC BURSITIS OF LEFT HIP: ICD-10-CM

## 2020-12-05 RX ORDER — GABAPENTIN 300 MG/1
CAPSULE ORAL
Qty: 180 CAPSULE | Refills: 0 | Status: SHIPPED | OUTPATIENT
Start: 2020-12-05 | End: 2021-01-20 | Stop reason: SDUPTHER

## 2020-12-07 ENCOUNTER — TELEPHONE (OUTPATIENT)
Dept: FAMILY MEDICINE CLINIC | Facility: CLINIC | Age: 65
End: 2020-12-07

## 2020-12-07 RX ORDER — CELECOXIB 200 MG/1
CAPSULE ORAL
Qty: 30 CAPSULE | Refills: 0 | Status: SHIPPED | OUTPATIENT
Start: 2020-12-07 | End: 2021-02-02 | Stop reason: SDUPTHER

## 2020-12-11 ENCOUNTER — VBI (OUTPATIENT)
Dept: ADMINISTRATIVE | Facility: OTHER | Age: 65
End: 2020-12-11

## 2021-01-04 DIAGNOSIS — K21.9 GASTROESOPHAGEAL REFLUX DISEASE: ICD-10-CM

## 2021-01-04 RX ORDER — OMEPRAZOLE 40 MG/1
CAPSULE, DELAYED RELEASE ORAL
Qty: 30 CAPSULE | Refills: 0 | Status: SHIPPED | OUTPATIENT
Start: 2021-01-04 | End: 2021-01-20 | Stop reason: SDUPTHER

## 2021-01-08 DIAGNOSIS — F41.9 ANXIETY: ICD-10-CM

## 2021-01-08 RX ORDER — CLONAZEPAM 0.5 MG/1
TABLET ORAL
Qty: 30 TABLET | OUTPATIENT
Start: 2021-01-08

## 2021-01-20 ENCOUNTER — TELEPHONE (OUTPATIENT)
Dept: PAIN MEDICINE | Facility: CLINIC | Age: 66
End: 2021-01-20

## 2021-01-20 DIAGNOSIS — E78.00 HYPERCHOLESTEROLEMIA: ICD-10-CM

## 2021-01-20 DIAGNOSIS — F32.A DEPRESSION, UNSPECIFIED DEPRESSION TYPE: ICD-10-CM

## 2021-01-20 DIAGNOSIS — G89.4 CHRONIC PAIN SYNDROME: ICD-10-CM

## 2021-01-20 DIAGNOSIS — K21.9 GASTROESOPHAGEAL REFLUX DISEASE: ICD-10-CM

## 2021-01-20 DIAGNOSIS — R05.9 COUGH: ICD-10-CM

## 2021-01-20 DIAGNOSIS — J30.9 ALLERGIC RHINITIS, UNSPECIFIED SEASONALITY, UNSPECIFIED TRIGGER: ICD-10-CM

## 2021-01-20 RX ORDER — FLUTICASONE PROPIONATE 50 MCG
2 SPRAY, SUSPENSION (ML) NASAL DAILY
Qty: 3 BOTTLE | Refills: 1 | Status: SHIPPED | OUTPATIENT
Start: 2021-01-20 | End: 2021-05-14 | Stop reason: SDUPTHER

## 2021-01-20 RX ORDER — OMEPRAZOLE 40 MG/1
40 CAPSULE, DELAYED RELEASE ORAL DAILY
Qty: 90 CAPSULE | Refills: 1 | Status: SHIPPED | OUTPATIENT
Start: 2021-01-20 | End: 2021-05-05 | Stop reason: SDUPTHER

## 2021-01-20 RX ORDER — VENLAFAXINE HYDROCHLORIDE 150 MG/1
150 CAPSULE, EXTENDED RELEASE ORAL EVERY MORNING
Qty: 90 CAPSULE | Refills: 1 | Status: SHIPPED | OUTPATIENT
Start: 2021-01-20 | End: 2021-05-05 | Stop reason: SDUPTHER

## 2021-01-20 RX ORDER — GABAPENTIN 300 MG/1
300 CAPSULE ORAL 2 TIMES DAILY
Qty: 180 CAPSULE | Refills: 0 | Status: SHIPPED | OUTPATIENT
Start: 2021-01-20 | End: 2021-04-02 | Stop reason: SDUPTHER

## 2021-01-20 RX ORDER — LORATADINE 10 MG/1
10 TABLET ORAL DAILY
Qty: 90 TABLET | Refills: 1 | Status: SHIPPED | OUTPATIENT
Start: 2021-01-20

## 2021-01-20 RX ORDER — ATORVASTATIN CALCIUM 20 MG/1
20 TABLET, FILM COATED ORAL DAILY
Qty: 90 TABLET | Refills: 3 | Status: SHIPPED | OUTPATIENT
Start: 2021-01-20 | End: 2021-05-05 | Stop reason: SDUPTHER

## 2021-01-20 RX ORDER — VENLAFAXINE HYDROCHLORIDE 75 MG/1
75 CAPSULE, EXTENDED RELEASE ORAL EVERY EVENING
Qty: 90 CAPSULE | Refills: 1 | Status: SHIPPED | OUTPATIENT
Start: 2021-01-20 | End: 2021-05-05 | Stop reason: SDUPTHER

## 2021-01-20 NOTE — TELEPHONE ENCOUNTER
Pt called in to update the new pharmacy below:    Name     And Member Id #         [facility]   VDI#[id number]  Fax# [phone]  Member # [id number]        Please be advise thank you

## 2021-02-02 DIAGNOSIS — M70.62 TROCHANTERIC BURSITIS OF LEFT HIP: ICD-10-CM

## 2021-02-02 DIAGNOSIS — M51.16 LUMBAR DISC DISEASE WITH RADICULOPATHY: ICD-10-CM

## 2021-02-02 RX ORDER — CELECOXIB 200 MG/1
200 CAPSULE ORAL DAILY
Qty: 30 CAPSULE | Refills: 1 | Status: SHIPPED | OUTPATIENT
Start: 2021-02-02 | End: 2021-03-11 | Stop reason: SDUPTHER

## 2021-02-02 NOTE — TELEPHONE ENCOUNTER
I called and spoke with patient   Patient does not need a prior auth   She has new insurance and needs her prescription for Celebrex sent to [facility] on file    Thank you

## 2021-02-02 NOTE — TELEPHONE ENCOUNTER
Patient called to check status on her Celebrex 200 mg prior authorization  She hasn't heard back from anyone yet   Please advise, enrike    Call back# [phone]  Please call even if its pending

## 2021-02-04 ENCOUNTER — TELEPHONE (OUTPATIENT)
Dept: FAMILY MEDICINE CLINIC | Facility: CLINIC | Age: 66
End: 2021-02-04

## 2021-02-04 NOTE — TELEPHONE ENCOUNTER
Patient now has scripts going as a 90 day supply to [facility]   She is requesting Clonazepam to be sent to [facility]   Last fill # 30 01/05/21 01/05/2021 1 11/01/2020   CLONAZEPAM 0 5 MG TABLET  4 0 4 [facility] [id number] PILLP ([id number]) 0  [place]    01/05/2021 1 10/05/2020   CLONAZEPAM 0 5 MG TABLET  26 0 26 [facility] [id number] PILLP ([id number]) 1  [place]

## 2021-02-05 DIAGNOSIS — F41.9 ANXIETY: ICD-10-CM

## 2021-02-05 RX ORDER — CLONAZEPAM 0.5 MG/1
0.5 TABLET ORAL
Qty: 90 TABLET | Refills: 1 | Status: SHIPPED | OUTPATIENT
Start: 2021-02-05 | End: 2021-05-05 | Stop reason: SDUPTHER

## 2021-03-11 ENCOUNTER — TELEPHONE (OUTPATIENT)
Dept: PAIN MEDICINE | Facility: MEDICAL CENTER | Age: 66
End: 2021-03-11

## 2021-03-11 DIAGNOSIS — M51.16 LUMBAR DISC DISEASE WITH RADICULOPATHY: ICD-10-CM

## 2021-03-11 DIAGNOSIS — M70.62 TROCHANTERIC BURSITIS OF LEFT HIP: ICD-10-CM

## 2021-03-11 RX ORDER — CELECOXIB 200 MG/1
200 CAPSULE ORAL DAILY
Qty: 90 CAPSULE | Refills: 0 | Status: SHIPPED | OUTPATIENT
Start: 2021-03-11 | End: 2021-06-03 | Stop reason: SDUPTHER

## 2021-03-11 NOTE — TELEPHONE ENCOUNTER
Refill request for 3 month supply:  celebrex 200mg  Take 1 capsule (200 mg total) by mouth daily  Remaining pills: 4   [facility] [address]  Call back# [phone]

## 2021-04-01 ENCOUNTER — VBI (OUTPATIENT)
Dept: ADMINISTRATIVE | Facility: OTHER | Age: 66
End: 2021-04-01

## 2021-04-02 ENCOUNTER — OFFICE VISIT (OUTPATIENT)
Dept: FAMILY MEDICINE CLINIC | Facility: CLINIC | Age: 66
End: 2021-04-02
Payer: COMMERCIAL

## 2021-04-02 VITALS
SYSTOLIC BLOOD PRESSURE: 120 MMHG | WEIGHT: 162 LBS | HEART RATE: 80 BPM | HEIGHT: 63 IN | TEMPERATURE: 98.4 F | BODY MASS INDEX: 28.7 KG/M2 | DIASTOLIC BLOOD PRESSURE: 70 MMHG

## 2021-04-02 DIAGNOSIS — F33.0 MILD EPISODE OF RECURRENT MAJOR DEPRESSIVE DISORDER (HCC): ICD-10-CM

## 2021-04-02 DIAGNOSIS — M51.16 LUMBAR DISC DISEASE WITH RADICULOPATHY: ICD-10-CM

## 2021-04-02 DIAGNOSIS — G89.4 CHRONIC PAIN SYNDROME: Primary | ICD-10-CM

## 2021-04-02 DIAGNOSIS — E78.49 OTHER HYPERLIPIDEMIA: ICD-10-CM

## 2021-04-02 PROCEDURE — 3725F SCREEN DEPRESSION PERFORMED: CPT | Performed by: FAMILY MEDICINE

## 2021-04-02 PROCEDURE — 99214 OFFICE O/P EST MOD 30 MIN: CPT | Performed by: FAMILY MEDICINE

## 2021-04-02 PROCEDURE — 1036F TOBACCO NON-USER: CPT | Performed by: FAMILY MEDICINE

## 2021-04-02 PROCEDURE — 3008F BODY MASS INDEX DOCD: CPT | Performed by: FAMILY MEDICINE

## 2021-04-02 PROCEDURE — 1160F RVW MEDS BY RX/DR IN RCRD: CPT | Performed by: FAMILY MEDICINE

## 2021-04-02 RX ORDER — PREDNISOLONE ACETATE 10 MG/ML
SUSPENSION/ DROPS OPHTHALMIC
COMMUNITY
Start: 2021-01-28 | End: 2021-04-02 | Stop reason: ALTCHOICE

## 2021-04-02 RX ORDER — GABAPENTIN 300 MG/1
300 CAPSULE ORAL 3 TIMES DAILY
Qty: 270 CAPSULE | Refills: 0
Start: 2021-04-02 | End: 2021-05-14 | Stop reason: SDUPTHER

## 2021-04-02 RX ORDER — OFLOXACIN 3 MG/ML
SOLUTION/ DROPS OPHTHALMIC
COMMUNITY
Start: 2021-01-28 | End: 2021-06-22 | Stop reason: ALTCHOICE

## 2021-04-02 NOTE — PROGRESS NOTES
Assessment/Plan:    Lumbar disc disease with radiculopathy  Patient with persistent pain  Discussed treatment going forward  Recommend that she follow-up with pain management to discuss injections  Continue present medications for now  Recheck 6 months -earlier if worse    Mild episode of recurrent major depressive disorder ([facility] )   Depression Screening Follow-up Plan: Patient's depression screening was positive with a PHQ-2 score of 4  Their PHQ-9 score was 9  Patient assessed for underlying major depression  They have no active suicidal ideations  Brief counseling provided and recommend additional follow-up/re-evaluation next office visit  Patient with persistent symptoms does not wish to change medications at present  Continue present treatment  Recheck 6 months-earlier if worse    Other hyperlipidemia    Monitor labs  Continue atorvastatin  Recheck 6 months    Chronic pain syndrome  Patient with persistent pain  Discussed treatment going forward  Recommend that she follow-up with pain management to discuss injections  Continue present medications for now  Recheck 6 months -earlier if worse       Diagnoses and all orders for this visit:    Chronic pain syndrome  -     gabapentin (NEURONTIN) 300 mg capsule; Take 1 capsule (300 mg total) by mouth 3 (three) times a day    Mild episode of recurrent major depressive disorder ([facility] )  -     Comprehensive metabolic panel; Future  -     Cancel: Lipid panel; Future  -     TSH, 3rd generation with Free T4 reflex; Future    Other hyperlipidemia  -     Lipid panel; Future    Lumbar disc disease with radiculopathy    Other orders  -     ofloxacin (OCUFLOX) 0 3 % ophthalmic solution  -     Discontinue: prednisoLONE acetate (PRED FORTE) 1 % ophthalmic suspension          Subjective:      Patient ID: Elaine Sanderson is a 77 y o  female  f/u multiple med issues  - still struggling with mood  Has intermittent depressed mood and anhedonia  "I can't get motivated"   PHQ  - pt doing well s/p cataract surgery  Not sure that acuity is any better  - pt had colonoscopy on November 25 and does not need repeat for 5 years (2025)  Still gets some occasional bloating after eating    - pt denies CP, palpitations, lightheadedness or other CV symptoms with or without exertion  Pt tries to walk for exercise  - pt states that her low back pain is no better  Continues to f/u with Dr Abelino Chow (pain management) who would like to repeat injections  Pt reluctant  - pt has been smoking occasionally  No worsening SOB  - no new  issues  - pt not interested in getting COVID vaccine        The following portions of the patient's history were reviewed and updated as appropriate:   She  has a past medical history of Anxiety, Depression, Fractures, GERD (gastroesophageal reflux disease), Hyperlipidemia, Low back pain, Panic attacks, and Psychiatric disorder  She   Patient Active Problem List    Diagnosis Date Noted    Chronic pain syndrome 04/03/2021    Diarrhea 09/15/2020    Screening for colorectal cancer 09/15/2020    Lumbar disc disease with radiculopathy     Aftercare following surgery 05/22/2020    Trochanteric bursitis of left hip 05/22/2020    Status post left femoral intramedullary nail 03/05/2020    Anemia 03/02/2020    Closed displaced basicervical fracture of left femur ([facility] ) 02/29/2020    Other hyperlipidemia 02/29/2020    Intertrochanteric fracture of femur ([facility] ) 02/29/2020    Mild episode of recurrent major depressive disorder ([facility] ) 10/31/2017    Allergic rhinitis 06/29/2017    Headache 02/28/2017    Imbalance 02/28/2017    Elevated TSH 12/16/2016    Osteoarthritis 05/26/2013    Fatigue 12/13/2012    Gastroesophageal reflux disease 11/13/2012    Lower back pain 10/14/2012    Anxiety 09/18/2012     She  has a past surgical history that includes Bladder suspension (1980); Tubal ligation (1976); pr lap,cholecystectomy (N/A, 7/11/2016);  Gallbladder surgery; Urethra surgery; Colonoscopy; Cholecystectomy; pr open rx femur fx+intramed delphine (Left, 3/1/2020); and Fracture surgery (Left)  She  reports that she quit smoking about 1 years ago  Her smoking use included cigarettes  She smoked 0 00 packs per day for 15 00 years  She has never used smokeless tobacco  She reports that she does not drink alcohol or use drugs    Current Outpatient Medications   Medication Sig Dispense Refill    acetaminophen (TYLENOL) 650 mg CR tablet Take 1 tablet (650 mg total) by mouth every 8 (eight) hours as needed for mild pain 30 tablet 0    atorvastatin (LIPITOR) 20 mg tablet Take 1 tablet (20 mg total) by mouth daily 90 tablet 3    Calcium 600 MG tablet Take 600 mg by mouth daily       celecoxib (CeleBREX) 200 mg capsule Take 1 capsule (200 mg total) by mouth daily 90 capsule 0    Cholecalciferol (VITAMIN D3) 1000 units CAPS Take 1,000 Units by mouth daily       clonazePAM (KlonoPIN) 0 5 mg tablet Take 1 tablet (0 5 mg total) by mouth daily at bedtime 90 tablet 1    cyanocobalamin 1000 MCG tablet Take 1,000 mcg by mouth daily       fluticasone (FLONASE) 50 mcg/act nasal spray 2 sprays into each nostril daily 3 Bottle 1    gabapentin (NEURONTIN) 300 mg capsule Take 1 capsule (300 mg total) by mouth 3 (three) times a day 270 capsule 0    lidocaine (LIDODERM) 5 % Apply 1 patch topically daily Remove & Discard patch within 12 hours or as directed by MD 30 patch 0    loratadine (CLARITIN) 10 mg tablet Take 1 tablet (10 mg total) by mouth daily 90 tablet 1    Magnesium Oxide -Mg Supplement 400 MG CAPS Take 1 capsule by mouth daily      Multiple Vitamin (MULTI-VITAMIN DAILY) TABS Take by mouth      ofloxacin (OCUFLOX) 0 3 % ophthalmic solution       omeprazole (PriLOSEC) 40 MG capsule Take 1 capsule (40 mg total) by mouth daily 90 capsule 1    polyethylene glycol (MIRALAX) 17 g packet Take 17 g by mouth daily 14 each 0    venlafaxine (EFFEXOR-XR) 150 mg 24 hr capsule Take 1 capsule (150 mg total) by mouth every morning 90 capsule 1    venlafaxine (EFFEXOR-XR) 75 mg 24 hr capsule Take 1 capsule (75 mg total) by mouth every evening 90 capsule 1     No current facility-administered medications for this visit  She is allergic to aspartame - food allergy; vancomycin; and penicillins       Review of Systems   Constitutional: Negative  HENT: Negative  Eyes: Negative  Respiratory: Negative  Cardiovascular: Negative  Gastrointestinal: Negative  Endocrine: Negative  Genitourinary: Negative  Musculoskeletal: Positive for arthralgias, back pain and gait problem (occasional)  Negative for neck pain and neck stiffness  Skin: Negative  Allergic/Immunologic: Negative  Neurological: Negative for dizziness, weakness, light-headedness, numbness and headaches  Hematological: Negative  Psychiatric/Behavioral: Negative  Objective:      /70   Pulse 80   Temp 98 4 °F (36 9 °C)   Ht 5' 3" (1 6 m)   Wt 73 5 kg (162 lb)   BMI 28 70 kg/m²          Physical Exam  Vitals signs reviewed  Constitutional:       Appearance: Normal appearance  She is well-developed  She is not diaphoretic  HENT:      Head: Normocephalic and atraumatic  Right Ear: Tympanic membrane, ear canal and external ear normal       Left Ear: Tympanic membrane, ear canal and external ear normal    Eyes:      Extraocular Movements: Extraocular movements intact  Conjunctiva/sclera: Conjunctivae normal       Pupils: Pupils are equal, round, and reactive to light  Neck:      Musculoskeletal: Normal range of motion and neck supple  No muscular tenderness  Thyroid: No thyromegaly  Vascular: No JVD  Cardiovascular:      Rate and Rhythm: Normal rate and regular rhythm  Pulses: Normal pulses  Heart sounds: No murmur  Pulmonary:      Effort: Pulmonary effort is normal       Breath sounds: Normal breath sounds  Abdominal:      General: There is no distension        Palpations: Abdomen is soft  There is no mass  Tenderness: There is no abdominal tenderness  Musculoskeletal: Normal range of motion  General: Tenderness (over lower lumbr paraspinal muscles and SI joints  No spasm appreciated) present  Right lower leg: No edema  Left lower leg: No edema  Lymphadenopathy:      Cervical: No cervical adenopathy  Skin:     General: Skin is warm and dry  Capillary Refill: Capillary refill takes less than 2 seconds  Neurological:      Mental Status: She is alert and oriented to person, place, and time  Cranial Nerves: No cranial nerve deficit  Sensory: No sensory deficit  Motor: No weakness or abnormal muscle tone  Coordination: Coordination normal       Gait: Gait abnormal (mildly antalgic appearing gait at onset  )  Deep Tendon Reflexes: Reflexes are normal and symmetric  Reflexes normal    Psychiatric:         Mood and Affect: Mood normal          Behavior: Behavior normal          Thought Content: Thought content normal          Judgment: Judgment normal       Comments: PHQ-9 Depression Screening    PHQ-9:   Frequency of the following problems over the past two weeks:      Little interest or pleasure in doing things: 2 - more than half the days  Feeling down, depressed, or hopeless: 2 - more than half the days  Trouble falling or staying asleep, or sleeping too much: 0 - not at all  Feeling tired or having little energy: 2 - more than half the days  Poor appetite or overeatin - not at all  Feeling bad about yourself - or that you are a failure or have let   yourself or your family down: 1 - several days  Trouble concentrating on things, such as reading the newspaper or watching   television: 2 - more than half the days  Moving or speaking so slowly that other people could have noticed   Or the   opposite - being so fidgety or restless that you have been moving around a   lot more than usual: 0 - not at all  Thoughts that you would be better off dead, or of hurting yourself in some   way: 0 - not at all  PHQ-2 Score: 4  PHQ-9 Score: 9

## 2021-04-03 PROBLEM — Z01.818 PREOPERATIVE EXAMINATION: Status: RESOLVED | Noted: 2020-02-29 | Resolved: 2021-04-03

## 2021-04-03 PROBLEM — G89.4 CHRONIC PAIN SYNDROME: Status: ACTIVE | Noted: 2021-04-03

## 2021-04-03 NOTE — ASSESSMENT & PLAN NOTE
Patient with persistent pain  Discussed treatment going forward  Recommend that she follow-up with pain management to discuss injections  Continue present medications for now    Recheck 6 months -earlier if worse

## 2021-04-03 NOTE — ASSESSMENT & PLAN NOTE
Depression Screening Follow-up Plan: Patient's depression screening was positive with a PHQ-2 score of 4  Their PHQ-9 score was 9  Patient assessed for underlying major depression  They have no active suicidal ideations  Brief counseling provided and recommend additional follow-up/re-evaluation next office visit  Patient with persistent symptoms does not wish to change medications at present  Continue present treatment    Recheck 6 months-earlier if worse

## 2021-04-16 ENCOUNTER — TELEPHONE (OUTPATIENT)
Dept: FAMILY MEDICINE CLINIC | Facility: CLINIC | Age: 66
End: 2021-04-16

## 2021-04-16 NOTE — TELEPHONE ENCOUNTER
Gabapentin is not working like you wanted, she is still falling asleep in the afternoon, she has no energy, pl adv

## 2021-04-19 NOTE — TELEPHONE ENCOUNTER
Yes she is sleeping at night and it is helping with the pain but she has no energy during the day and just wants to sleep

## 2021-04-20 NOTE — TELEPHONE ENCOUNTER
Please have her keep taking the med qHS  She should get the labs done as directed  Is she willing to do a home sleep study?

## 2021-05-05 DIAGNOSIS — E78.00 HYPERCHOLESTEROLEMIA: ICD-10-CM

## 2021-05-05 DIAGNOSIS — F32.A DEPRESSION, UNSPECIFIED DEPRESSION TYPE: ICD-10-CM

## 2021-05-05 DIAGNOSIS — K21.9 GASTROESOPHAGEAL REFLUX DISEASE: ICD-10-CM

## 2021-05-05 DIAGNOSIS — F41.9 ANXIETY: ICD-10-CM

## 2021-05-05 RX ORDER — CLONAZEPAM 0.5 MG/1
0.5 TABLET ORAL
Qty: 90 TABLET | Refills: 1 | Status: SHIPPED | OUTPATIENT
Start: 2021-05-05 | End: 2021-11-05 | Stop reason: SDUPTHER

## 2021-05-05 RX ORDER — ATORVASTATIN CALCIUM 20 MG/1
20 TABLET, FILM COATED ORAL DAILY
Qty: 90 TABLET | Refills: 3 | Status: SHIPPED | OUTPATIENT
Start: 2021-05-05 | End: 2022-04-20 | Stop reason: SDUPTHER

## 2021-05-05 RX ORDER — VENLAFAXINE HYDROCHLORIDE 75 MG/1
75 CAPSULE, EXTENDED RELEASE ORAL EVERY EVENING
Qty: 90 CAPSULE | Refills: 3 | Status: SHIPPED | OUTPATIENT
Start: 2021-05-05 | End: 2022-04-20 | Stop reason: SDUPTHER

## 2021-05-05 RX ORDER — VENLAFAXINE HYDROCHLORIDE 150 MG/1
150 CAPSULE, EXTENDED RELEASE ORAL EVERY MORNING
Qty: 90 CAPSULE | Refills: 3 | Status: SHIPPED | OUTPATIENT
Start: 2021-05-05 | End: 2022-04-20 | Stop reason: SDUPTHER

## 2021-05-05 RX ORDER — OMEPRAZOLE 40 MG/1
40 CAPSULE, DELAYED RELEASE ORAL DAILY
Qty: 90 CAPSULE | Refills: 3 | Status: SHIPPED | OUTPATIENT
Start: 2021-05-05 | End: 2022-04-20 | Stop reason: SDUPTHER

## 2021-05-14 DIAGNOSIS — J30.9 ALLERGIC RHINITIS, UNSPECIFIED SEASONALITY, UNSPECIFIED TRIGGER: ICD-10-CM

## 2021-05-14 DIAGNOSIS — G89.4 CHRONIC PAIN SYNDROME: ICD-10-CM

## 2021-05-14 RX ORDER — FLUTICASONE PROPIONATE 50 MCG
2 SPRAY, SUSPENSION (ML) NASAL DAILY
Qty: 30 ML | Refills: 5 | Status: SHIPPED | OUTPATIENT
Start: 2021-05-14 | End: 2021-06-04 | Stop reason: SDUPTHER

## 2021-05-14 RX ORDER — GABAPENTIN 300 MG/1
300 CAPSULE ORAL 3 TIMES DAILY
Qty: 270 CAPSULE | Refills: 3 | Status: SHIPPED | OUTPATIENT
Start: 2021-05-14 | End: 2021-10-11

## 2021-05-21 DIAGNOSIS — J30.9 ALLERGIC RHINITIS, UNSPECIFIED SEASONALITY, UNSPECIFIED TRIGGER: ICD-10-CM

## 2021-05-21 RX ORDER — FLUTICASONE PROPIONATE 50 MCG
2 SPRAY, SUSPENSION (ML) NASAL DAILY
Qty: 30 ML | Refills: 5 | Status: CANCELLED | OUTPATIENT
Start: 2021-05-21

## 2021-06-03 ENCOUNTER — TELEPHONE (OUTPATIENT)
Dept: PAIN MEDICINE | Facility: MEDICAL CENTER | Age: 66
End: 2021-06-03

## 2021-06-03 DIAGNOSIS — M51.16 LUMBAR DISC DISEASE WITH RADICULOPATHY: ICD-10-CM

## 2021-06-03 DIAGNOSIS — M70.62 TROCHANTERIC BURSITIS OF LEFT HIP: ICD-10-CM

## 2021-06-03 RX ORDER — CELECOXIB 200 MG/1
200 CAPSULE ORAL DAILY
Qty: 90 CAPSULE | Refills: 0 | Status: SHIPPED | OUTPATIENT
Start: 2021-06-03 | End: 2021-09-13 | Stop reason: SDUPTHER

## 2021-06-03 NOTE — TELEPHONE ENCOUNTER
Patient called in a refill of :    Name of medication: celecoxib (CeleBREX) 200 mg capsule - 90 day supply      Frequency: Take 1 capsule (200 mg total) by mouth daily  How many left: 7  Pharmacy: [facility]  Best call back # [phone]  Pt aware it can take 24-48 hrs to process refills

## 2021-06-04 DIAGNOSIS — J30.9 ALLERGIC RHINITIS, UNSPECIFIED SEASONALITY, UNSPECIFIED TRIGGER: ICD-10-CM

## 2021-06-04 RX ORDER — FLUTICASONE PROPIONATE 50 MCG
2 SPRAY, SUSPENSION (ML) NASAL DAILY
Qty: 48 G | Refills: 3 | Status: SHIPPED | OUTPATIENT
Start: 2021-06-04

## 2021-06-22 ENCOUNTER — APPOINTMENT (OUTPATIENT)
Dept: LAB | Facility: CLINIC | Age: 66
End: 2021-06-22
Payer: COMMERCIAL

## 2021-06-22 ENCOUNTER — OFFICE VISIT (OUTPATIENT)
Dept: FAMILY MEDICINE CLINIC | Facility: CLINIC | Age: 66
End: 2021-06-22
Payer: COMMERCIAL

## 2021-06-22 VITALS
OXYGEN SATURATION: 95 % | HEART RATE: 81 BPM | DIASTOLIC BLOOD PRESSURE: 72 MMHG | SYSTOLIC BLOOD PRESSURE: 124 MMHG | WEIGHT: 162.8 LBS | HEIGHT: 63 IN | BODY MASS INDEX: 28.84 KG/M2 | TEMPERATURE: 97.6 F

## 2021-06-22 DIAGNOSIS — F33.0 MILD EPISODE OF RECURRENT MAJOR DEPRESSIVE DISORDER (HCC): ICD-10-CM

## 2021-06-22 DIAGNOSIS — J06.9 ACUTE URI: Primary | ICD-10-CM

## 2021-06-22 DIAGNOSIS — E78.49 OTHER HYPERLIPIDEMIA: ICD-10-CM

## 2021-06-22 LAB
ALBUMIN SERPL BCP-MCNC: 3.7 G/DL (ref 3.5–5)
ALP SERPL-CCNC: 143 U/L (ref 46–116)
ALT SERPL W P-5'-P-CCNC: 17 U/L (ref 12–78)
ANION GAP SERPL CALCULATED.3IONS-SCNC: 7 MMOL/L (ref 4–13)
AST SERPL W P-5'-P-CCNC: 23 U/L (ref 5–45)
BILIRUB SERPL-MCNC: 0.26 MG/DL (ref 0.2–1)
BUN SERPL-MCNC: 13 MG/DL (ref 5–25)
CALCIUM SERPL-MCNC: 9.3 MG/DL (ref 8.3–10.1)
CHLORIDE SERPL-SCNC: 110 MMOL/L (ref 100–108)
CHOLEST SERPL-MCNC: 208 MG/DL (ref 50–200)
CO2 SERPL-SCNC: 26 MMOL/L (ref 21–32)
CREAT SERPL-MCNC: 0.88 MG/DL (ref 0.6–1.3)
GFR SERPL CREATININE-BSD FRML MDRD: 68 ML/MIN/1.73SQ M
GLUCOSE P FAST SERPL-MCNC: 83 MG/DL (ref 65–99)
HDLC SERPL-MCNC: 43 MG/DL
LDLC SERPL CALC-MCNC: 116 MG/DL (ref 0–100)
NONHDLC SERPL-MCNC: 165 MG/DL
POTASSIUM SERPL-SCNC: 4.3 MMOL/L (ref 3.5–5.3)
PROT SERPL-MCNC: 7.3 G/DL (ref 6.4–8.2)
SODIUM SERPL-SCNC: 143 MMOL/L (ref 136–145)
TRIGL SERPL-MCNC: 243 MG/DL
TSH SERPL DL<=0.05 MIU/L-ACNC: 3.55 UIU/ML (ref 0.36–3.74)

## 2021-06-22 PROCEDURE — 1036F TOBACCO NON-USER: CPT | Performed by: NURSE PRACTITIONER

## 2021-06-22 PROCEDURE — 36415 COLL VENOUS BLD VENIPUNCTURE: CPT

## 2021-06-22 PROCEDURE — 80061 LIPID PANEL: CPT

## 2021-06-22 PROCEDURE — 3008F BODY MASS INDEX DOCD: CPT | Performed by: NURSE PRACTITIONER

## 2021-06-22 PROCEDURE — 99213 OFFICE O/P EST LOW 20 MIN: CPT | Performed by: NURSE PRACTITIONER

## 2021-06-22 PROCEDURE — 84443 ASSAY THYROID STIM HORMONE: CPT

## 2021-06-22 PROCEDURE — 80053 COMPREHEN METABOLIC PANEL: CPT

## 2021-06-22 PROCEDURE — 1160F RVW MEDS BY RX/DR IN RCRD: CPT | Performed by: NURSE PRACTITIONER

## 2021-06-22 RX ORDER — METHYLPREDNISOLONE 4 MG/1
TABLET ORAL
Qty: 21 EACH | Refills: 0 | Status: SHIPPED | OUTPATIENT
Start: 2021-06-22 | End: 2021-10-11 | Stop reason: ALTCHOICE

## 2021-06-22 RX ORDER — CLARITHROMYCIN 500 MG/1
500 TABLET, COATED ORAL EVERY 12 HOURS SCHEDULED
Qty: 14 TABLET | Refills: 0 | Status: SHIPPED | OUTPATIENT
Start: 2021-06-22 | End: 2021-06-29

## 2021-06-22 NOTE — PROGRESS NOTES
Assessment/Plan:     Diagnoses and all orders for this visit:    Acute URI  -     clarithromycin (BIAXIN) 500 mg tablet; Take 1 tablet (500 mg total) by mouth every 12 (twelve) hours for 7 days  -     methylPREDNISolone 4 MG tablet therapy pack; Use as directed on package        Discussed with patient plan to treat with 7 day course of antibiotic and a medrol dose pack  Patient instructed to call if no improvement in 72 hours or symptoms worsen  Patient not interested in obtaining [address] has an appointment scheduled with PCP for 10/11/2021 for Medicare annual Wellness Visit    Subjective:      Patient ID: Charito Blanchard is a 77 y o  female  77 y  o female presenting with bilateral ear pressure, right > left along with post nasal drip and cough for the past week  She denies fever, chills, headache, shortness of breath or GI symptoms  She does experience some chest tightness related to post nasal drip and cough  Family History   Problem Relation Age of Onset    Other Mother         Methicillin Resistant Staphylococcus Aureus Infection    Mental illness Mother     Alzheimer's disease Mother     Lung cancer Father     Alzheimer's disease Other      Social History     Socioeconomic History    Marital status:       Spouse name: Not on file    Number of children: Not on file    Years of education: 15    Highest education level: Not on file   Occupational History    Occupation: Retired   Tobacco Use    Smoking status: Former Smoker     Packs/day: 0 00     Years: 15 00     Pack years: 0 00     Types: Cigarettes     Quit date: 2019     Years since quittin 2    Smokeless tobacco: Never Used   Vaping Use    Vaping Use: Never used   Substance and Sexual Activity    Alcohol use: Never     Alcohol/week: 0 0 standard drinks    Drug use: No    Sexual activity: Not on file   Other Topics Concern    Not on file   Social History Narrative    Always uses setbelt    Denied History of Dental care: regularly    Does not exercise    Multiple organ donor    No guns in home    No living will    Pets in the home    Denied history of Power of  in existence    Tea    Water intake, adequate (per day)    Denied history of daily cola consumption     Social Determinants of Health     Financial Resource Strain:     Difficulty of Paying Living Expenses:    Food Insecurity:     Worried About Running Out of Food in the Last Year:     Ran Out of Food in the Last Year:    Transportation Needs:     Lack of Transportation (Medical):      Lack of Transportation (Non-Medical):    Physical Activity:     Days of Exercise per Week:     Minutes of Exercise per Session:    Stress:     Feeling of Stress :    Social Connections:     Frequency of Communication with Friends and Family:     Frequency of Social Gatherings with Friends and Family:     Attends [employer] Services:     Active Member of Clubs or Organizations:     Attends Club or Organization Meetings:     Marital Status:    Intimate Partner Violence:     Fear of Current or Ex-Partner:     Emotionally Abused:     Physically Abused:     Sexually Abused:      E-Cigarette/Vaping    E-Cigarette Use Never User      E-Cigarette/Vaping Substances    Nicotine No     Flavoring No      Past Medical History:   Diagnosis Date    Anxiety     Depression     Fractures     GERD (gastroesophageal reflux disease)     Hyperlipidemia     Low back pain     Panic attacks     Psychiatric disorder     anxiety, depression     Past Surgical History:   Procedure Laterality Date    BLADDER SUSPENSION  1980    Mesh    CHOLECYSTECTOMY      COLONOSCOPY      FRACTURE SURGERY Left     GALLBLADDER SURGERY      MT LAP,CHOLECYSTECTOMY N/A 7/11/2016    Procedure: LAPAROSCOPIC CHOLECYSTECTOMY ;  Surgeon: Simone Mazariegos DO;  [facility];  Service: General  Last assessed: 5/24/16    MT OPEN RX FEMUR FX+INTRAMED NEMO Left 3/1/2020    Procedure: INSERTION NAIL IM FEMUR ANTEGRADE (TROCHANTERIC);   Surgeon: Nela Hebert MD;  [facility];  Service: Orthopedics    TUBAL LIGATION  1976    URETHRA SURGERY       Allergies   Allergen Reactions    Aspartame - Food Allergy Headache    Vancomycin Hives    Penicillins Hives and Rash       Current Outpatient Medications:     acetaminophen (TYLENOL) 650 mg CR tablet, Take 1 tablet (650 mg total) by mouth every 8 (eight) hours as needed for mild pain, Disp: 30 tablet, Rfl: 0    atorvastatin (LIPITOR) 20 mg tablet, Take 1 tablet (20 mg total) by mouth daily, Disp: 90 tablet, Rfl: 3    Calcium 600 MG tablet, Take 600 mg by mouth daily , Disp: , Rfl:     celecoxib (CeleBREX) 200 mg capsule, Take 1 capsule (200 mg total) by mouth daily, Disp: 90 capsule, Rfl: 0    Cholecalciferol (VITAMIN D3) 1000 units CAPS, Take 1,000 Units by mouth daily , Disp: , Rfl:     clonazePAM (KlonoPIN) 0 5 mg tablet, Take 1 tablet (0 5 mg total) by mouth daily at bedtime, Disp: 90 tablet, Rfl: 1    cyanocobalamin 1000 MCG tablet, Take 1,000 mcg by mouth daily , Disp: , Rfl:     fluticasone (FLONASE) 50 mcg/act nasal spray, 2 sprays into each nostril daily, Disp: 48 g, Rfl: 3    gabapentin (NEURONTIN) 300 mg capsule, Take 1 capsule (300 mg total) by mouth 3 (three) times a day, Disp: 270 capsule, Rfl: 3    lidocaine (LIDODERM) 5 %, Apply 1 patch topically daily Remove & Discard patch within 12 hours or as directed by MD, Disp: 30 patch, Rfl: 0    loratadine (CLARITIN) 10 mg tablet, Take 1 tablet (10 mg total) by mouth daily, Disp: 90 tablet, Rfl: 1    Magnesium Oxide -Mg Supplement 400 MG CAPS, Take 1 capsule by mouth daily, Disp: , Rfl:     Multiple Vitamin (MULTI-VITAMIN DAILY) TABS, Take by mouth, Disp: , Rfl:     omeprazole (PriLOSEC) 40 MG capsule, Take 1 capsule (40 mg total) by mouth daily, Disp: 90 capsule, Rfl: 3    venlafaxine (EFFEXOR-XR) 150 mg 24 hr capsule, Take 1 capsule (150 mg total) by mouth every morning, Disp: 90 capsule, Rfl: 3    venlafaxine (EFFEXOR-XR) 75 mg 24 hr capsule, Take 1 capsule (75 mg total) by mouth every evening, Disp: 90 capsule, Rfl: 3    clarithromycin (BIAXIN) 500 mg tablet, Take 1 tablet (500 mg total) by mouth every 12 (twelve) hours for 7 days, Disp: 14 tablet, Rfl: 0    methylPREDNISolone 4 MG tablet therapy pack, Use as directed on package, Disp: 21 each, Rfl: 0    Review of Systems   Constitutional: Negative for chills, fatigue and fever  HENT: Positive for congestion, ear pain and postnasal drip  Negative for sneezing and sore throat  Eyes: Negative  Respiratory: Positive for cough  Negative for chest tightness, shortness of breath and wheezing  Cardiovascular: Negative for chest pain and palpitations  Gastrointestinal: Negative for abdominal distention, abdominal pain, diarrhea and nausea  Endocrine: Negative  Musculoskeletal: Negative for myalgias  Skin: Negative for color change and rash  Allergic/Immunologic: Negative  Neurological: Negative for dizziness, weakness, light-headedness and headaches  Psychiatric/Behavioral: Negative  Objective:    /72   Pulse 81   Temp 97 6 °F (36 4 °C)   Ht 5' 3" (1 6 m)   Wt 73 8 kg (162 lb 12 8 oz)   SpO2 95%   BMI 28 84 kg/m² (Reviewed)     Physical Exam  Vitals reviewed  Constitutional:       General: She is not in acute distress  Appearance: She is well-developed and well-groomed  She is not ill-appearing  HENT:      Head: Normocephalic and atraumatic  Right Ear: Ear canal and external ear normal  Tympanic membrane is injected and erythematous  Left Ear: Ear canal and external ear normal  Tympanic membrane is injected  Nose: Congestion present  Right Sinus: No maxillary sinus tenderness or frontal sinus tenderness  Left Sinus: No maxillary sinus tenderness or frontal sinus tenderness        Comments: Patient had a facial covering in place as per office protocol     Mouth/Throat:      Lips: Pink  Mouth: Mucous membranes are moist       Pharynx: Oropharyngeal exudate present  No posterior oropharyngeal erythema  Eyes:      General: Lids are normal       Extraocular Movements: Extraocular movements intact  Conjunctiva/sclera: Conjunctivae normal       Pupils: Pupils are equal, round, and reactive to light  Neck:      Trachea: Trachea normal    Cardiovascular:      Rate and Rhythm: Normal rate and regular rhythm  Heart sounds: Normal heart sounds  Pulmonary:      Effort: Pulmonary effort is normal  No tachypnea, bradypnea or respiratory distress  Breath sounds: Examination of the right-upper field reveals rhonchi  Examination of the left-upper field reveals rhonchi  Rhonchi present  Musculoskeletal:      Cervical back: Neck supple  Skin:     General: Skin is warm and dry  Capillary Refill: Capillary refill takes less than 2 seconds  Neurological:      General: No focal deficit present  Mental Status: She is alert and oriented to person, place, and time  Cranial Nerves: Cranial nerves are intact  Motor: Motor function is intact  Psychiatric:         Mood and Affect: Mood normal          Behavior: Behavior normal  Behavior is cooperative  Thought Content: Thought content normal            BMI Counseling: Body mass index is 28 84 kg/m²  The BMI is above normal  Nutrition recommendations include decreasing portion sizes, encouraging healthy choices of fruits and vegetables, consuming healthier snacks, moderation in carbohydrate intake and increasing intake of lean protein  Exercise recommendations include exercising 3-5 times per week and strength training exercises

## 2021-06-30 ENCOUNTER — TELEPHONE (OUTPATIENT)
Dept: FAMILY MEDICINE CLINIC | Facility: CLINIC | Age: 66
End: 2021-06-30

## 2021-06-30 NOTE — TELEPHONE ENCOUNTER
Spoke to patient and she agrees on the med but she cant get to the pharmacy to pick it up   Will check local pharms to see if any of them have a delivery service and let us know

## 2021-06-30 NOTE — TELEPHONE ENCOUNTER
Patient was seen last week with [facility] for cough ear infection and congestion   She just finished her meds and she is still coughing and congestion and can not hear out of her ear     She doesn't feel any better at all   Please advise

## 2021-07-14 ENCOUNTER — VBI (OUTPATIENT)
Dept: ADMINISTRATIVE | Facility: OTHER | Age: 66
End: 2021-07-14

## 2021-08-11 ENCOUNTER — ESTABLISHED (OUTPATIENT)
Dept: URBAN - METROPOLITAN AREA CLINIC 6 | Facility: CLINIC | Age: 66
End: 2021-08-11

## 2021-08-11 DIAGNOSIS — Z96.1: ICD-10-CM

## 2021-08-11 DIAGNOSIS — H40.023: ICD-10-CM

## 2021-08-11 PROCEDURE — 76514 ECHO EXAM OF EYE THICKNESS: CPT

## 2021-08-11 PROCEDURE — 92133 CPTRZD OPH DX IMG PST SGM ON: CPT

## 2021-08-11 PROCEDURE — 92014 COMPRE OPH EXAM EST PT 1/>: CPT

## 2021-08-11 ASSESSMENT — TONOMETRY
OS_IOP_MMHG: 12
OD_IOP_MMHG: 13

## 2021-08-11 ASSESSMENT — VISUAL ACUITY
OS_SC: 20/25-2
OD_SC: 20/30-2

## 2021-08-11 ASSESSMENT — PACHYMETRY
OD_CT_UM: 527
OS_CT_UM: 533

## 2021-09-13 DIAGNOSIS — M51.16 LUMBAR DISC DISEASE WITH RADICULOPATHY: ICD-10-CM

## 2021-09-13 DIAGNOSIS — M70.62 TROCHANTERIC BURSITIS OF LEFT HIP: ICD-10-CM

## 2021-09-13 RX ORDER — CELECOXIB 200 MG/1
200 CAPSULE ORAL DAILY
Qty: 90 CAPSULE | Refills: 0 | Status: SHIPPED | OUTPATIENT
Start: 2021-09-13 | End: 2021-12-06 | Stop reason: SDUPTHER

## 2021-10-11 ENCOUNTER — OFFICE VISIT (OUTPATIENT)
Dept: FAMILY MEDICINE CLINIC | Facility: CLINIC | Age: 66
End: 2021-10-11
Payer: COMMERCIAL

## 2021-10-11 VITALS
SYSTOLIC BLOOD PRESSURE: 128 MMHG | WEIGHT: 160 LBS | DIASTOLIC BLOOD PRESSURE: 80 MMHG | HEIGHT: 63 IN | BODY MASS INDEX: 28.35 KG/M2 | TEMPERATURE: 98.6 F | HEART RATE: 74 BPM

## 2021-10-11 DIAGNOSIS — R20.2 PARESTHESIAS: ICD-10-CM

## 2021-10-11 DIAGNOSIS — K21.9 GASTROESOPHAGEAL REFLUX DISEASE WITHOUT ESOPHAGITIS: ICD-10-CM

## 2021-10-11 DIAGNOSIS — M54.2 CERVICALGIA: ICD-10-CM

## 2021-10-11 DIAGNOSIS — Z12.31 ENCOUNTER FOR SCREENING MAMMOGRAM FOR BREAST CANCER: ICD-10-CM

## 2021-10-11 DIAGNOSIS — Z78.0 ASYMPTOMATIC POSTMENOPAUSAL STATE: ICD-10-CM

## 2021-10-11 DIAGNOSIS — E78.49 OTHER HYPERLIPIDEMIA: ICD-10-CM

## 2021-10-11 DIAGNOSIS — G89.4 CHRONIC PAIN SYNDROME: ICD-10-CM

## 2021-10-11 DIAGNOSIS — Z00.00 MEDICARE ANNUAL WELLNESS VISIT, SUBSEQUENT: Primary | ICD-10-CM

## 2021-10-11 DIAGNOSIS — R79.89 ELEVATED TSH: ICD-10-CM

## 2021-10-11 DIAGNOSIS — F33.0 MILD EPISODE OF RECURRENT MAJOR DEPRESSIVE DISORDER (HCC): ICD-10-CM

## 2021-10-11 PROCEDURE — 1160F RVW MEDS BY RX/DR IN RCRD: CPT | Performed by: FAMILY MEDICINE

## 2021-10-11 PROCEDURE — 1125F AMNT PAIN NOTED PAIN PRSNT: CPT | Performed by: FAMILY MEDICINE

## 2021-10-11 PROCEDURE — 3288F FALL RISK ASSESSMENT DOCD: CPT | Performed by: FAMILY MEDICINE

## 2021-10-11 PROCEDURE — 1170F FXNL STATUS ASSESSED: CPT | Performed by: FAMILY MEDICINE

## 2021-10-11 PROCEDURE — 3725F SCREEN DEPRESSION PERFORMED: CPT | Performed by: FAMILY MEDICINE

## 2021-10-11 PROCEDURE — 3008F BODY MASS INDEX DOCD: CPT | Performed by: FAMILY MEDICINE

## 2021-10-11 PROCEDURE — G0439 PPPS, SUBSEQ VISIT: HCPCS | Performed by: FAMILY MEDICINE

## 2021-10-11 PROCEDURE — 1036F TOBACCO NON-USER: CPT | Performed by: FAMILY MEDICINE

## 2021-10-11 PROCEDURE — 99214 OFFICE O/P EST MOD 30 MIN: CPT | Performed by: FAMILY MEDICINE

## 2021-10-11 RX ORDER — GABAPENTIN 300 MG/1
300 CAPSULE ORAL 2 TIMES DAILY
Qty: 180 CAPSULE | Refills: 3
Start: 2021-10-11 | End: 2022-07-11

## 2021-10-21 ENCOUNTER — VBI (OUTPATIENT)
Dept: ADMINISTRATIVE | Facility: OTHER | Age: 66
End: 2021-10-21

## 2021-11-05 DIAGNOSIS — F41.9 ANXIETY: ICD-10-CM

## 2021-11-05 RX ORDER — CLONAZEPAM 0.5 MG/1
0.5 TABLET ORAL
Qty: 90 TABLET | Refills: 1 | Status: SHIPPED | OUTPATIENT
Start: 2021-11-05 | End: 2022-04-20 | Stop reason: SDUPTHER

## 2021-11-17 ENCOUNTER — TELEPHONE (OUTPATIENT)
Dept: PAIN MEDICINE | Facility: CLINIC | Age: 66
End: 2021-11-17

## 2021-12-06 DIAGNOSIS — M70.62 TROCHANTERIC BURSITIS OF LEFT HIP: ICD-10-CM

## 2021-12-06 DIAGNOSIS — M51.16 LUMBAR DISC DISEASE WITH RADICULOPATHY: ICD-10-CM

## 2021-12-06 RX ORDER — CELECOXIB 200 MG/1
200 CAPSULE ORAL DAILY
Qty: 90 CAPSULE | Refills: 1 | Status: SHIPPED | OUTPATIENT
Start: 2021-12-06 | End: 2022-01-26 | Stop reason: SDUPTHER

## 2022-01-26 ENCOUNTER — OFFICE VISIT (OUTPATIENT)
Dept: PAIN MEDICINE | Facility: CLINIC | Age: 67
End: 2022-01-26
Payer: COMMERCIAL

## 2022-01-26 VITALS
HEIGHT: 63 IN | RESPIRATION RATE: 18 BRPM | WEIGHT: 158 LBS | BODY MASS INDEX: 28 KG/M2 | SYSTOLIC BLOOD PRESSURE: 118 MMHG | DIASTOLIC BLOOD PRESSURE: 74 MMHG | HEART RATE: 80 BPM

## 2022-01-26 DIAGNOSIS — M51.16 LUMBAR DISC DISEASE WITH RADICULOPATHY: ICD-10-CM

## 2022-01-26 DIAGNOSIS — G89.4 CHRONIC PAIN SYNDROME: Primary | ICD-10-CM

## 2022-01-26 DIAGNOSIS — M70.62 TROCHANTERIC BURSITIS OF LEFT HIP: ICD-10-CM

## 2022-01-26 PROCEDURE — 1036F TOBACCO NON-USER: CPT | Performed by: NURSE PRACTITIONER

## 2022-01-26 PROCEDURE — 3008F BODY MASS INDEX DOCD: CPT | Performed by: NURSE PRACTITIONER

## 2022-01-26 PROCEDURE — 1160F RVW MEDS BY RX/DR IN RCRD: CPT | Performed by: NURSE PRACTITIONER

## 2022-01-26 PROCEDURE — 99214 OFFICE O/P EST MOD 30 MIN: CPT | Performed by: NURSE PRACTITIONER

## 2022-01-26 RX ORDER — CELECOXIB 200 MG/1
200 CAPSULE ORAL DAILY
Qty: 90 CAPSULE | Refills: 1 | Status: SHIPPED | OUTPATIENT
Start: 2022-01-26 | End: 2022-02-01 | Stop reason: SDUPTHER

## 2022-01-26 NOTE — PROGRESS NOTES
Assessment:  1  Chronic pain syndrome    2  Lumbar disc disease with radiculopathy    3  Trochanteric bursitis of left hip        Plan:  The patient was seen in the office today for re-evaluation of her chronic pain secondary to lumbar disc disease with radiculopathy, left hip GTB and status post left femoral intramedullary nail  She was last seen in the office on 10/27/2020 and she has had a left-sided L3-L4, L4-L5 transforaminal epidural steroid injection which she states did not provide her with much relief from her pain symptoms  She was then started on Celebrex 200 mg daily which she states significantly decreases her pain symptoms by 75-80%  She is not interested in moving forward with any more injections at this time  I did refill her Celebrex and told her that she could discuss having her PCP take over the prescription since her co-pay to come here is much higher than at her PCP  Patient was appreciative  She will follow-up with us on an as-needed basis  My impressions and treatment recommendations were discussed in detail with the patient who verbalized understanding and had no further questions  Discharge instructions were provided  I personally saw and examined the patient and I agree with the above discussed plan of care  No orders of the defined types were placed in this encounter  New Medications Ordered This Visit   Medications    celecoxib (CeleBREX) 200 mg capsule     Sig: Take 1 capsule (200 mg total) by mouth daily     Dispense:  90 capsule     Refill:  1       History of Present Illness:  Elaine Sanderson is a 74 y o  female who presents for a follow up office visit in regards to Back Pain and Leg Pain (LLE)  The patients current symptoms include a pain score of 5/10 that is intermittent and worse in the evening  She describes the quality as dull aching and throbbing  The pain is in her low back and left hip        I have personally reviewed and/or updated the patient's past medical history, past surgical history, family history, social history, current medications, allergies, and vital signs today  Review of Systems   Respiratory: Negative for shortness of breath  Cardiovascular: Negative for chest pain  Gastrointestinal: Negative for constipation, diarrhea, nausea and vomiting  Musculoskeletal: Positive for back pain and gait problem  Negative for arthralgias, joint swelling and myalgias  LLE Pain   Skin: Negative for rash  Neurological: Negative for dizziness, seizures and weakness  All other systems reviewed and are negative        Patient Active Problem List   Diagnosis    Allergic rhinitis    Anxiety    Elevated TSH    Fatigue    Gastroesophageal reflux disease    Headache    Imbalance    Lower back pain    Mild episode of recurrent major depressive disorder ([facility] )    Osteoarthritis    Closed displaced basicervical fracture of left femur ([facility] )    Other hyperlipidemia    Intertrochanteric fracture of femur ([facility] )    Anemia    Status post left femoral intramedullary nail    Aftercare following surgery    Trochanteric bursitis of left hip    Lumbar disc disease with radiculopathy    Diarrhea    Screening for colorectal cancer    Chronic pain syndrome    Cervicalgia    Paresthesias       Past Medical History:   Diagnosis Date    Anxiety     Depression     Fractures     GERD (gastroesophageal reflux disease)     Hyperlipidemia     Low back pain     Panic attacks     Psychiatric disorder     anxiety, depression       Past Surgical History:   Procedure Laterality Date    BLADDER SUSPENSION  1980    Mesh    CHOLECYSTECTOMY      COLONOSCOPY      FRACTURE SURGERY Left     GALLBLADDER SURGERY      VT LAP,CHOLECYSTECTOMY N/A 7/11/2016    Procedure: LAPAROSCOPIC CHOLECYSTECTOMY ;  Surgeon: Milena Swanson DO;  [facility];  Service: General  Last assessed: 5/24/16    VT OPEN RX FEMUR FX+INTRAMED NEMO Left 3/1/2020    Procedure: INSERTION NAIL IM FEMUR ANTEGRADE (TROCHANTERIC);   Surgeon: Natalia Kearney MD;  [facility];  Service: Orthopedics    TUBAL LIGATION      URETHRA SURGERY         Family History   Problem Relation Age of Onset    Other Mother         Methicillin Resistant Staphylococcus Aureus Infection    Mental illness Mother     Alzheimer's disease Mother     Lung cancer Father     Alzheimer's disease Other        Social History     Occupational History    Occupation: Retired   Tobacco Use    Smoking status: Former Smoker     Packs/day: 0 00     Years: 15 00     Pack years: 0 00     Types: Cigarettes     Quit date: 2019     Years since quittin 7    Smokeless tobacco: Never Used   Vaping Use    Vaping Use: Never used   Substance and Sexual Activity    Alcohol use: Never     Alcohol/week: 0 0 standard drinks    Drug use: No    Sexual activity: Not on file       Current Outpatient Medications on File Prior to Visit   Medication Sig    acetaminophen (TYLENOL) 650 mg CR tablet Take 1 tablet (650 mg total) by mouth every 8 (eight) hours as needed for mild pain    atorvastatin (LIPITOR) 20 mg tablet Take 1 tablet (20 mg total) by mouth daily    Calcium 600 MG tablet Take 600 mg by mouth daily     Cholecalciferol (VITAMIN D3) 1000 units CAPS Take 1,000 Units by mouth daily     clonazePAM (KlonoPIN) 0 5 mg tablet Take 1 tablet (0 5 mg total) by mouth daily at bedtime    cyanocobalamin 1000 MCG tablet Take 1,000 mcg by mouth daily     fluticasone (FLONASE) 50 mcg/act nasal spray 2 sprays into each nostril daily    gabapentin (NEURONTIN) 300 mg capsule Take 1 capsule (300 mg total) by mouth 2 (two) times a day    guaiFENesin (MUCINEX) 600 mg 12 hr tablet Take 2 tablets (1,200 mg total) by mouth every 12 (twelve) hours    lidocaine (LIDODERM) 5 % Apply 1 patch topically daily Remove & Discard patch within 12 hours or as directed by MD    loratadine (CLARITIN) 10 mg tablet Take 1 tablet (10 mg total) by mouth daily    Magnesium Oxide -Mg Supplement 400 MG CAPS Take 1 capsule by mouth daily    Multiple Vitamin (MULTI-VITAMIN DAILY) TABS Take by mouth    omeprazole (PriLOSEC) 40 MG capsule Take 1 capsule (40 mg total) by mouth daily    venlafaxine (EFFEXOR-XR) 150 mg 24 hr capsule Take 1 capsule (150 mg total) by mouth every morning    venlafaxine (EFFEXOR-XR) 75 mg 24 hr capsule Take 1 capsule (75 mg total) by mouth every evening    [DISCONTINUED] celecoxib (CeleBREX) 200 mg capsule Take 1 capsule (200 mg total) by mouth daily     No current facility-administered medications on file prior to visit  Allergies   Allergen Reactions    Aspartame - Food Allergy Headache    Vancomycin Hives    Penicillins Hives and Rash       Physical Exam:    /74   Pulse 80   Resp 18   Ht 5' 3" (1 6 m)   Wt 71 7 kg (158 lb)   BMI 27 99 kg/m²     Constitutional:normal, well developed, well nourished, alert, in no distress and non-toxic and no overt pain behavior    Eyes:anicteric  HEENT:grossly intact  Neck:supple, symmetric, trachea midline and no masses   Pulmonary:even and unlabored  Cardiovascular:No edema or pitting edema present  Skin:Normal without rashes or lesions and well hydrated  Psychiatric:Mood and affect appropriate  Neurologic:Cranial Nerves II-XII grossly intact  Musculoskeletal:antalgic and ambulates with cane    Imaging

## 2022-02-01 ENCOUNTER — TELEPHONE (OUTPATIENT)
Dept: FAMILY MEDICINE CLINIC | Facility: CLINIC | Age: 67
End: 2022-02-01

## 2022-02-01 NOTE — TELEPHONE ENCOUNTER
Pt called today re: script for celebrex  Pt's pain management physician is asking if you would take over placing the refill order when pt needs it      Pl adv

## 2022-04-12 ENCOUNTER — RA CDI HCC (OUTPATIENT)
Dept: OTHER | Facility: HOSPITAL | Age: 67
End: 2022-04-12

## 2022-04-12 NOTE — PROGRESS NOTES
[facility]  coding opportunities       Chart reviewed, no opportunity found:   [address]        Patients Insurance     Medicare Insurance: [employer] Ad[employer]age

## 2022-04-20 DIAGNOSIS — F41.9 ANXIETY: ICD-10-CM

## 2022-04-20 DIAGNOSIS — E78.00 HYPERCHOLESTEROLEMIA: ICD-10-CM

## 2022-04-20 DIAGNOSIS — K21.9 GASTROESOPHAGEAL REFLUX DISEASE: ICD-10-CM

## 2022-04-20 DIAGNOSIS — F32.A DEPRESSION, UNSPECIFIED DEPRESSION TYPE: ICD-10-CM

## 2022-04-20 RX ORDER — VENLAFAXINE HYDROCHLORIDE 75 MG/1
75 CAPSULE, EXTENDED RELEASE ORAL EVERY EVENING
Qty: 90 CAPSULE | Refills: 3 | Status: SHIPPED | OUTPATIENT
Start: 2022-04-20

## 2022-04-20 RX ORDER — OMEPRAZOLE 40 MG/1
40 CAPSULE, DELAYED RELEASE ORAL DAILY
Qty: 90 CAPSULE | Refills: 3 | Status: SHIPPED | OUTPATIENT
Start: 2022-04-20

## 2022-04-20 RX ORDER — CLONAZEPAM 0.5 MG/1
0.5 TABLET ORAL
Qty: 90 TABLET | Refills: 1 | Status: SHIPPED | OUTPATIENT
Start: 2022-04-20 | End: 2022-04-22 | Stop reason: SDUPTHER

## 2022-04-20 RX ORDER — CLONAZEPAM 0.5 MG/1
0.5 TABLET ORAL
Qty: 90 TABLET | Refills: 1 | Status: SHIPPED | OUTPATIENT
Start: 2022-04-20 | End: 2022-04-20 | Stop reason: SDUPTHER

## 2022-04-20 RX ORDER — ATORVASTATIN CALCIUM 20 MG/1
20 TABLET, FILM COATED ORAL DAILY
Qty: 90 TABLET | Refills: 3 | Status: SHIPPED | OUTPATIENT
Start: 2022-04-20

## 2022-04-20 RX ORDER — VENLAFAXINE HYDROCHLORIDE 150 MG/1
150 CAPSULE, EXTENDED RELEASE ORAL EVERY MORNING
Qty: 90 CAPSULE | Refills: 3 | Status: SHIPPED | OUTPATIENT
Start: 2022-04-20

## 2022-04-20 NOTE — TELEPHONE ENCOUNTER
02/01/2022 11/05/2021 clonazePAM (Tablet)  90 0 90 0 5 MG NA [facility]  [facility]  Medicare 1 / 1

## 2022-04-20 NOTE — TELEPHONE ENCOUNTER
1  [id number] 02/01/2022 11/05/2021 clonazePAM (Tablet)  90 0 90 0 5 MG NA [employer]  [facility] Medicare 1 / 1 [place]    1  [id number] 11/06/2021 11/05/2021 clonazePAM (Tablet)  90 0 90 0 5 MG NA [employer]  [facility] [place] 0 / 1 [place]    1  [id number] 08/06/2021 05/05/2021 clonazePAM (Tablet)  90 0 90 0 5 MG NA [employer]  [facility] [place] 1 / 1 [place]

## 2022-04-22 DIAGNOSIS — F41.9 ANXIETY: ICD-10-CM

## 2022-04-22 RX ORDER — CLONAZEPAM 0.5 MG/1
0.5 TABLET ORAL
Qty: 90 TABLET | Refills: 1 | Status: SHIPPED | OUTPATIENT
Start: 2022-04-22

## 2022-05-24 ENCOUNTER — OFFICE VISIT (OUTPATIENT)
Dept: FAMILY MEDICINE CLINIC | Facility: CLINIC | Age: 67
End: 2022-05-24
Payer: COMMERCIAL

## 2022-05-24 ENCOUNTER — HOSPITAL ENCOUNTER (OUTPATIENT)
Dept: INFUSION CENTER | Facility: HOSPITAL | Age: 67
Discharge: HOME | End: 2022-05-24
Payer: COMMERCIAL

## 2022-05-24 VITALS
HEART RATE: 79 BPM | DIASTOLIC BLOOD PRESSURE: 57 MMHG | SYSTOLIC BLOOD PRESSURE: 121 MMHG | OXYGEN SATURATION: 97 % | TEMPERATURE: 97.5 F | RESPIRATION RATE: 18 BRPM

## 2022-05-24 VITALS
HEIGHT: 63 IN | WEIGHT: 153 LBS | SYSTOLIC BLOOD PRESSURE: 120 MMHG | HEART RATE: 76 BPM | TEMPERATURE: 98.3 F | BODY MASS INDEX: 27.11 KG/M2 | DIASTOLIC BLOOD PRESSURE: 70 MMHG

## 2022-05-24 DIAGNOSIS — U07.1 COVID: Primary | ICD-10-CM

## 2022-05-24 DIAGNOSIS — J01.10 ACUTE NON-RECURRENT FRONTAL SINUSITIS: ICD-10-CM

## 2022-05-24 DIAGNOSIS — Z11.52 ENCOUNTER FOR SCREENING FOR COVID-19: Primary | ICD-10-CM

## 2022-05-24 LAB
SARS-COV-2 AG UPPER RESP QL IA: POSITIVE
VALID CONTROL: ABNORMAL

## 2022-05-24 PROCEDURE — 3008F BODY MASS INDEX DOCD: CPT | Performed by: FAMILY MEDICINE

## 2022-05-24 PROCEDURE — M0222 HB BEBTELOVIMAB INJECTION: HCPCS | Performed by: FAMILY MEDICINE

## 2022-05-24 PROCEDURE — 1036F TOBACCO NON-USER: CPT | Performed by: FAMILY MEDICINE

## 2022-05-24 PROCEDURE — 99214 OFFICE O/P EST MOD 30 MIN: CPT | Performed by: FAMILY MEDICINE

## 2022-05-24 PROCEDURE — 1160F RVW MEDS BY RX/DR IN RCRD: CPT | Performed by: FAMILY MEDICINE

## 2022-05-24 RX ORDER — SODIUM CHLORIDE 9 MG/ML
20 INJECTION, SOLUTION INTRAVENOUS ONCE
Status: CANCELLED | OUTPATIENT
Start: 2022-05-24

## 2022-05-24 RX ORDER — ACETAMINOPHEN 325 MG/1
650 TABLET ORAL ONCE AS NEEDED
Status: CANCELLED | OUTPATIENT
Start: 2022-05-24

## 2022-05-24 RX ORDER — ALBUTEROL SULFATE 90 UG/1
3 AEROSOL, METERED RESPIRATORY (INHALATION) ONCE AS NEEDED
Status: CANCELLED | OUTPATIENT
Start: 2022-05-24

## 2022-05-24 RX ORDER — BEBTELOVIMAB 87.5 MG/ML
175 INJECTION, SOLUTION INTRAVENOUS ONCE
Status: CANCELLED | OUTPATIENT
Start: 2022-05-24

## 2022-05-24 RX ORDER — DEXTROMETHORPHAN HYDROBROMIDE AND PROMETHAZINE HYDROCHLORIDE 15; 6.25 MG/5ML; MG/5ML
5 SOLUTION ORAL 4 TIMES DAILY PRN
Qty: 150 ML | Refills: 0 | Status: SHIPPED | OUTPATIENT
Start: 2022-05-24

## 2022-05-24 RX ORDER — ACETAMINOPHEN 325 MG/1
650 TABLET ORAL ONCE AS NEEDED
Status: DISCONTINUED | OUTPATIENT
Start: 2022-05-24 | End: 2022-05-27 | Stop reason: HOSPADM

## 2022-05-24 RX ORDER — SODIUM CHLORIDE 9 MG/ML
20 INJECTION, SOLUTION INTRAVENOUS ONCE
Status: DISCONTINUED | OUTPATIENT
Start: 2022-05-24 | End: 2022-05-27 | Stop reason: HOSPADM

## 2022-05-24 RX ORDER — BEBTELOVIMAB 87.5 MG/ML
175 INJECTION, SOLUTION INTRAVENOUS ONCE
Status: COMPLETED | OUTPATIENT
Start: 2022-05-24 | End: 2022-05-24

## 2022-05-24 RX ORDER — ALBUTEROL SULFATE 90 UG/1
3 AEROSOL, METERED RESPIRATORY (INHALATION) ONCE AS NEEDED
Status: DISCONTINUED | OUTPATIENT
Start: 2022-05-24 | End: 2022-05-27 | Stop reason: HOSPADM

## 2022-05-24 RX ORDER — ONDANSETRON 2 MG/ML
4 INJECTION INTRAMUSCULAR; INTRAVENOUS ONCE AS NEEDED
Status: DISCONTINUED | OUTPATIENT
Start: 2022-05-24 | End: 2022-05-27 | Stop reason: HOSPADM

## 2022-05-24 RX ORDER — AZITHROMYCIN 250 MG/1
TABLET, FILM COATED ORAL
Qty: 6 TABLET | Refills: 0 | Status: SHIPPED | OUTPATIENT
Start: 2022-05-24 | End: 2022-05-28

## 2022-05-24 RX ORDER — ONDANSETRON 2 MG/ML
4 INJECTION INTRAMUSCULAR; INTRAVENOUS ONCE AS NEEDED
Status: CANCELLED | OUTPATIENT
Start: 2022-05-24

## 2022-05-24 RX ADMIN — BEBTELOVIMAB 175 MG: 87.5 INJECTION, SOLUTION INTRAVENOUS at 10:30

## 2022-05-24 NOTE — PROGRESS NOTES
COVID-19 Outpatient Progress Note    Assessment/Plan:    Problem List Items Addressed This Visit        Other    COVID - Primary    Relevant Medications    Promethazine-DM (PHENERGAN-DM) 6 25-15 mg/5 mL oral syrup      Other Visit Diagnoses     Acute non-recurrent frontal sinusitis        Relevant Medications    azithromycin (ZITHROMAX) 250 mg tablet         Disposition:     Rapid antigen testing was performed and the result is POSITIVE for COVID-19  Patient has COVID-19 infection  Based off CDC guidelines, they were recommended to isolate for 5 days from the date of the positive test  If they remain asymptomatic, isolation may be ended followed by 5 days of wearing a mask when around othes to minimize risk of infecting others  If they have a fever, continue to stay home until fever resolves for at least 24 hours  Discussed symptom directed medication options with patient  Discussed vitamin D, vitamin C, and/or zinc supplementation with patient  Day 6 of COVID illness symptoms  Patient is not vaccinated, and has a BMI greater than 25  She is also a long-term, intermittent smoker though she does not have any definitive diagnosis of COPD/emphysema  Patient at high risk for worsening symptoms  We arranged for patient to get monoclonal antibody treatment at [facility] this morning  Patient does not drive  Daughter to pick her up from this office and bring her for treatment  Start Zithromax for her sinuses  Patient will call in 48 hours with [id number] if worse  Patient call for any other problems or concerns in the interim      Patient meets criteria for Bebtelovimab infusion  They were counseled in regards to risks, benefits, and side effects of this infusion      Rajnce Ignaciainkles is an investigational medicine used to treat mild-to-moderate symptoms of COVID-19 in adults and children (15years of age and older weighing at least 80 pounds (40 kg)) with positive results of direct SARS-CoV-2 viral testing, and who are at high risk of progression to severe COVID-19, including hospitalization or death, and for whom other COVID-19 treatment options approved or authorized by FDA are not available or clinically appropriate  Bebtelovimab is investigational because it is still being studied  There is limited information about the safety and effectiveness of using bebtelovimab to treat people with mild-to-moderate COVID-19  The FDA has authorized the emergency use of bebtelovimab for the treatment of COVID-19 under an Emergency Use Authorization (EUA)  Saqib Babcock is not authorized for use in people who:  - are likely to be infected with a SARS-CoV-2 variant that is not able to be treated by bebtelovimab based on the circulating variants in your area (ask your health care provider about FDA and CDCs latest information on circulating variants by geographic area), or  - are hospitalized due to COVID-19, or  - require oxygen therapy and/or respiratory support due to COVID-19, or  - require an increase in baseline oxygen flow rate and/or respiratory support due to [address] and are on chronic oxygen therapy and/or respiratory support due to underlying nonCOVID-19 related comorbidity  How will I receive Bebtelovimab? Saqib Babcock will be given as an injection through a vein (intravenously or IV) over at least 30 seconds  You will be observed by your healthcare provider for at least 1 hour after you receive bebtelovimab  Possible side effects of Bebtelovimab: Allergic reactions can happen during and after infusion with bebtelovimab  Possible reactions include: fever, chills, nausea, headache, shortness of breath, low or high blood pressure, rapid or slow heart rate, chest discomfort or pain, weakness, confusion, feeling tired, wheezing, swelling of your lips, face, or throat, rash including hives, itching, muscle aches, dizziness, and sweating   These reactions may be severe or life threatening  Worsening symptoms after treatment: You may experience new or worsening symptoms after infusion, including fever, difficulty breathing, rapid or slow heart rate, tiredness, weakness or confusion  If these occur, contact your healthcare provider or seek immediate medical attention as some of these events have required hospitalization  It is unknown if these events are related to treatment or are due to the progression of COVID19  The side effects of getting any medicine by vein may include brief pain, bleeding, bruising of the skin, soreness, swelling, and possible infection at the infusion site  These are not all the possible side effects of bebtelovimab  Not a lot of people have been given bebtelovimab  Serious and unexpected side effects may happen  Bebtelovimab are still being studied so it is possible that all of the risks are not known at this time  It is possible that bebtelovimab could interfere with your body's own ability to fight off a future infection of SARS-CoV-2  Similarly, bebtelovimab may reduce your bodys immune response to a vaccine for SARS-CoV-2  Specific studies have not been conducted to address these possible risks  Talk to your healthcare provider if you have any questions  Emergency Use Authorization:    The [facility] FDA has made bebtelovimab available under an emergency access mechanism called an EUA  The EUA is supported by a [place] of Health and Human Service (HHS) declaration that circumstances exist to justify the emergency use of drugs and biological products during the COVID-19 pandemic  Bebtelovimab have not undergone the same type of review as an FDA-approved or cleared product  The FDA may issue an EUA when certain criteria are met, which includes that there are no adequate, approved, and available alternatives   In addition, the FDA decision is based on the totality of scientific evidence available showing that it is reasonable to believe that the product meets certain criteria for safety, performance, and labeling and may be effective in treatment of patients during the COVID-19 pandemic  All of these criteria must be met to allow for the product to be used in the treatment of patients during the COVID-19 pandemic  The EUA for bebtelovimab together is in effect for the duration of the COVID-19 declaration justifying emergency use of these products, unless terminated or revoked (after which the product may no longer be used)  What if I am pregnant or breastfeeding? There is no experience treating pregnant women or breastfeeding mothers with bebtelovimab  For a mother and unborn baby, the benefit of receiving bebtelovimab may be greater than the risk from the treatment  If you are pregnant or breastfeeding, discuss your options and specific situation with your healthcare provider  How do I report side effects with Bebtelovimab? Contact your healthcare provider if you have any side effects that bother you or do not go away  Report side effects to FDA MedWatch at www fda gov/medwatch, or call 6-803-[facility] or to [address] [place] [address]  as shown below  Email: Cy@[email or web address]   Fax number: [phone]   Telephone number: 2-894-NITERC55 ([phone])    Full fact sheet document for patients can be found at: Brea fernando    The patient consents to proceed with bebtelovimab infusion  I have spent 30 minutes directly with the patient  Greater than 50% of this time was spent in counseling/coordination of care regarding: diagnostic results, prognosis, risks and benefits of treatment options, instructions for management, importance of treatment compliance, risk factor reductions and impressions        Encounter provider Ellis Olsen MD    [facility]  [address]    Recent Visits  Date Type Provider Dept   05/24/22 Office Visit MD Chema Chacon Eddy recent visits within past 7 days and meeting all other requirements  Future Appointments  No visits were found meeting these conditions  Showing future appointments within next 150 days and meeting all other requirements     Subjective:   Laura Plascencia is a 74 y o  female who is concerned about COVID-19  Patient's symptoms include fatigue, nasal congestion, cough, diarrhea, myalgias and headache  Patient denies fever, chills, malaise, rhinorrhea, sore throat, anosmia, loss of taste, shortness of breath, chest tightness, abdominal pain, nausea and vomiting      - Date of symptom onset: 5/18/2022  - Date of exposure: 5/11/2022      COVID-19 vaccination status: Not vaccinated    Exposure:   Contact with a person who is under investigation (PUI) for or who is positive for COVID-19 within the last 14 days?: Yes    Hospitalized recently for fever and/or lower respiratory symptoms?: No      Currently a healthcare worker that is involved in direct patient care?: No      Works in a special setting where the risk of COVID-19 transmission may be high? (this may include long-term care, correctional and assisted facilities; homeless shelters; assisted-living facilities and group homes ): No      Resident in a special setting where the risk of COVID-19 transmission may be high? (this may include long-term care, correctional and assisted facilities; homeless shelters; assisted-living facilities and group homes ): No      Patient came in for follow-up exam this morning however mention that she was congested and had been exposed to [place] recently  Review shows that patient's congestion started on 05/18 after exposure approximately 5-7 days previous  Patient is not vaccinated and does smoke  Symptoms are as above        Lab Results   Component Value Date    SARSCOV2 Not Detected 11/18/2020    SARSCOVAG Positive (A) 05/24/2022 Past Medical History:   Diagnosis Date    Anxiety     Depression     Fractures     GERD (gastroesophageal reflux disease)     Hyperlipidemia     Low back pain     Panic attacks     Psychiatric disorder     anxiety, depression     Past Surgical History:   Procedure Laterality Date    BLADDER SUSPENSION  1980    Mesh    CHOLECYSTECTOMY      COLONOSCOPY      FRACTURE SURGERY Left     GALLBLADDER SURGERY      NH LAP,CHOLECYSTECTOMY N/A 7/11/2016    Procedure: LAPAROSCOPIC CHOLECYSTECTOMY ;  Surgeon: Akila Knott DO;  [facility];  Service: General  Last assessed: 5/24/16    NH OPEN RX FEMUR FX+INTRAMED NEMO Left 3/1/2020    Procedure: INSERTION NAIL IM FEMUR ANTEGRADE (TROCHANTERIC);   Surgeon: Ronnell Ocampo MD;  [facility];  Service: Orthopedics    TUBAL LIGATION  1976    URETHRA SURGERY       Current Outpatient Medications   Medication Sig Dispense Refill    acetaminophen (TYLENOL) 650 mg CR tablet Take 1 tablet (650 mg total) by mouth every 8 (eight) hours as needed for mild pain 30 tablet 0    atorvastatin (LIPITOR) 20 mg tablet Take 1 tablet (20 mg total) by mouth daily 90 tablet 3    azithromycin (ZITHROMAX) 250 mg tablet 2 tab today then 1 tab po qd x 4 d 6 tablet 0    Calcium 600 MG tablet Take 600 mg by mouth daily       Cholecalciferol (VITAMIN D3) 1000 units CAPS Take 1,000 Units by mouth daily       clonazePAM (KlonoPIN) 0 5 mg tablet Take 1 tablet (0 5 mg total) by mouth daily at bedtime 90 tablet 1    cyanocobalamin 1000 MCG tablet Take 1,000 mcg by mouth daily       fluticasone (FLONASE) 50 mcg/act nasal spray 2 sprays into each nostril daily 48 g 3    gabapentin (NEURONTIN) 300 mg capsule Take 1 capsule (300 mg total) by mouth 2 (two) times a day 180 capsule 3    guaiFENesin (MUCINEX) 600 mg 12 hr tablet Take 2 tablets (1,200 mg total) by mouth every 12 (twelve) hours 14 tablet 0    lidocaine (LIDODERM) 5 % Apply 1 patch topically daily Remove & Discard patch within 12 hours or as directed by MD 30 patch 0    loratadine (CLARITIN) 10 mg tablet Take 1 tablet (10 mg total) by mouth daily 90 tablet 1    Magnesium Oxide -Mg Supplement 400 MG CAPS Take 1 capsule by mouth daily      Multiple Vitamin (MULTI-VITAMIN DAILY) TABS Take by mouth      omeprazole (PriLOSEC) 40 MG capsule Take 1 capsule (40 mg total) by mouth daily 90 capsule 3    Promethazine-DM (PHENERGAN-DM) 6 25-15 mg/5 mL oral syrup Take 5 mL by mouth as needed in the morning and 5 mL as needed at noon and 5 mL as needed in the evening and 5 mL as needed before bedtime for cough  150 mL 0    venlafaxine (EFFEXOR-XR) 150 mg 24 hr capsule Take 1 capsule (150 mg total) by mouth every morning 90 capsule 3    venlafaxine (EFFEXOR-XR) 75 mg 24 hr capsule Take 1 capsule (75 mg total) by mouth every evening 90 capsule 3    celecoxib (CeleBREX) 200 mg capsule Take 1 capsule (200 mg total) by mouth daily 90 capsule 1     No current facility-administered medications for this visit  Facility-Administered Medications Ordered in Other Visits   Medication Dose Route Frequency Provider Last Rate Last Admin    acetaminophen (TYLENOL) tablet 650 mg  650 mg Oral Once PRN Solomon Marie MD        albuterol (PROVENTIL HFA,VENTOLIN HFA) inhaler 3 puff  3 puff Inhalation Once PRN Solomon Marie MD        ondansetron [facility]) injection 4 mg  4 mg Intravenous Once PRN Solomon Marie MD        sodium chloride 0 9 % infusion  20 mL/hr Intravenous Once Solomon Marie MD         Allergies   Allergen Reactions    Aspartame - Food Allergy Headache    Vancomycin Hives    Penicillins Hives and Rash       Review of Systems   Constitutional: Positive for fatigue  Negative for chills and fever  HENT: Positive for congestion  Negative for rhinorrhea and sore throat  Respiratory: Positive for cough  Negative for chest tightness and shortness of breath      Gastrointestinal: Positive for diarrhea  Negative for abdominal pain, nausea and vomiting  Musculoskeletal: Positive for myalgias  Neurological: Positive for headaches  Objective:    Vitals:    05/24/22 0800   BP: 120/70   Pulse: 76   Temp: 98 3 °F (36 8 °C)   Weight: 69 4 kg (153 lb)   Height: 5' 3" (1 6 m)       Physical Exam  HENT:      Right Ear: Tympanic membrane, ear canal and external ear normal       Left Ear: Tympanic membrane, ear canal and external ear normal       Nose: Congestion present  Comments: Frontal sinuses TTP  Mouth/Throat:      Mouth: Mucous membranes are moist       Pharynx: No oropharyngeal exudate or posterior oropharyngeal erythema  Eyes:      Conjunctiva/sclera: Conjunctivae normal       Pupils: Pupils are equal, round, and reactive to light  Cardiovascular:      Rate and Rhythm: Normal rate and regular rhythm  Pulmonary:      Effort: Pulmonary effort is normal  No respiratory distress  Breath sounds: No wheezing or rales  Comments: O2 sat 96-97% on room air  Musculoskeletal:      Cervical back: No rigidity or tenderness  Right lower leg: No edema  Left lower leg: No edema  Lymphadenopathy:      Cervical: No cervical adenopathy  Skin:     General: Skin is warm  Capillary Refill: Capillary refill takes less than 2 seconds  Neurological:      Mental Status: She is alert and oriented to person, place, and time  VIRTUAL VISIT DISCLAIMER    Diane E Magill verbally agrees to participate in [employer]  Pt is aware that [employer] could be limited without vital signs or the ability to perform a full hands-on physical Juan José  understands she or the provider may request at any time to terminate the video visit and request the patient to seek care or treatment in person

## 2022-06-20 DIAGNOSIS — M51.16 LUMBAR DISC DISEASE WITH RADICULOPATHY: ICD-10-CM

## 2022-06-20 DIAGNOSIS — M70.62 TROCHANTERIC BURSITIS OF LEFT HIP: ICD-10-CM

## 2022-06-21 RX ORDER — CELECOXIB 200 MG/1
CAPSULE ORAL
Qty: 90 CAPSULE | Refills: 1 | Status: SHIPPED | OUTPATIENT
Start: 2022-06-21

## 2022-07-10 DIAGNOSIS — G89.4 CHRONIC PAIN SYNDROME: ICD-10-CM

## 2022-07-11 RX ORDER — GABAPENTIN 300 MG/1
CAPSULE ORAL
Qty: 270 CAPSULE | Refills: 3 | Status: SHIPPED | OUTPATIENT
Start: 2022-07-11

## 2022-08-25 ENCOUNTER — VBI (OUTPATIENT)
Dept: ADMINISTRATIVE | Facility: OTHER | Age: 67
End: 2022-08-25

## 2022-08-30 ENCOUNTER — HOSPITAL ENCOUNTER (OUTPATIENT)
Dept: RADIOLOGY | Age: 67
Discharge: HOME | End: 2022-08-30
Payer: COMMERCIAL

## 2022-08-30 VITALS — WEIGHT: 151 LBS | HEIGHT: 63 IN | BODY MASS INDEX: 26.75 KG/M2

## 2022-08-30 DIAGNOSIS — Z12.31 ENCOUNTER FOR SCREENING MAMMOGRAM FOR BREAST CANCER: ICD-10-CM

## 2022-08-30 DIAGNOSIS — Z12.31 ENCOUNTER FOR SCREENING MAMMOGRAM FOR MALIGNANT NEOPLASM OF BREAST: ICD-10-CM

## 2022-08-30 DIAGNOSIS — Z78.0 ASYMPTOMATIC POSTMENOPAUSAL STATE: ICD-10-CM

## 2022-08-30 PROCEDURE — 77063 BREAST TOMOSYNTHESIS BI: CPT

## 2022-08-30 PROCEDURE — 77080 DXA BONE DENSITY AXIAL: CPT

## 2022-08-30 PROCEDURE — 77067 SCR MAMMO BI INCL CAD: CPT

## 2022-08-31 ENCOUNTER — TELEPHONE (OUTPATIENT)
Dept: FAMILY MEDICINE CLINIC | Facility: CLINIC | Age: 67
End: 2022-08-31

## 2022-08-31 NOTE — TELEPHONE ENCOUNTER
Patient requesting a letter to excuse her from  [employer] duty for her anxiety and panic attacks     Please fax this to the [employer]    Fax# [phone]

## 2022-09-02 DIAGNOSIS — M81.0 AGE-RELATED OSTEOPOROSIS WITHOUT CURRENT PATHOLOGICAL FRACTURE: Primary | ICD-10-CM

## 2022-09-02 RX ORDER — IBANDRONATE SODIUM 150 MG/1
150 TABLET, FILM COATED ORAL
Qty: 3 TABLET | Refills: 3 | Status: SHIPPED | OUTPATIENT
Start: 2022-09-02 | End: 2022-09-30 | Stop reason: SDUPTHER

## 2022-09-06 ENCOUNTER — VBI (OUTPATIENT)
Dept: ADMINISTRATIVE | Facility: OTHER | Age: 67
End: 2022-09-06

## 2022-09-06 ENCOUNTER — TELEPHONE (OUTPATIENT)
Dept: FAMILY MEDICINE CLINIC | Facility: CLINIC | Age: 67
End: 2022-09-06

## 2022-09-06 NOTE — TELEPHONE ENCOUNTER
Patient called stating medication boniva is  $140 60  she states she can not afford this  I asked patient if she tried to use the good RX discount, she states she will [facility] to see if she can get a discount, and she also states that if she cant afford this medication, she is going to stop taking this  This message is just an FYI until we hear back from the patient on her decision

## 2022-09-06 NOTE — TELEPHONE ENCOUNTER
Patient is not sure if tylenol helps, she does have some at home and will try it  She will report in a couple of days if it helped or not

## 2022-09-06 NOTE — TELEPHONE ENCOUNTER
Patient called stating she was able to find that she can get medication BONIVA at [facility] pharmacy for $20 68  She says that she is going to return medication she received from mail order back, and will call back once she would like the script sent to [facility]  Patient has another concern  She states that when taking medication CELEBREX, she will get an awful headache  She is asking if there is a medication that can replace this one

## 2022-09-30 ENCOUNTER — TELEPHONE (OUTPATIENT)
Dept: FAMILY MEDICINE CLINIC | Facility: CLINIC | Age: 67
End: 2022-09-30

## 2022-09-30 DIAGNOSIS — M81.0 AGE-RELATED OSTEOPOROSIS WITHOUT CURRENT PATHOLOGICAL FRACTURE: ICD-10-CM

## 2022-09-30 RX ORDER — IBANDRONATE SODIUM 150 MG/1
150 TABLET, FILM COATED ORAL
Qty: 3 TABLET | Refills: 3 | Status: SHIPPED | OUTPATIENT
Start: 2022-09-30 | End: 2023-04-12 | Stop reason: SDUPTHER

## 2022-09-30 NOTE — TELEPHONE ENCOUNTER
Pt called asking if you could please resend the Boniva script to [facility] on [facility] and she will pick it up on Sunday

## 2022-10-19 DIAGNOSIS — F41.9 ANXIETY: ICD-10-CM

## 2022-10-19 RX ORDER — CLONAZEPAM 0.5 MG/1
TABLET ORAL
Qty: 90 TABLET | Refills: 1 | Status: SHIPPED | OUTPATIENT
Start: 2022-10-19

## 2022-10-19 NOTE — TELEPHONE ENCOUNTER
1  [id number] 07/10/2022  04/22/2022 clonazePAM (Tablet)  90 0 90 0 5 MG NA RENNY DAVILA POGODZINSKI  [facility] 1 / 1 [place]    1  [id number] 04/22/2022 04/22/2022 clonazePAM (Tablet)  90 0 90 0 5 MG NA RENNY DAVILA POGODZINSKI  [facility] 0 / 1 [place]    1  [id number] 02/01/2022 11/05/2021 clonazePAM (Tablet)  90 0 90 0 5 MG NA HOPE FRAZIER  [facility] 1 / 1 [place]

## 2023-01-13 DIAGNOSIS — M70.62 TROCHANTERIC BURSITIS OF LEFT HIP: ICD-10-CM

## 2023-01-13 DIAGNOSIS — G89.4 CHRONIC PAIN SYNDROME: ICD-10-CM

## 2023-01-13 DIAGNOSIS — M51.16 LUMBAR DISC DISEASE WITH RADICULOPATHY: ICD-10-CM

## 2023-01-13 DIAGNOSIS — K21.9 GASTROESOPHAGEAL REFLUX DISEASE: ICD-10-CM

## 2023-01-13 DIAGNOSIS — F41.9 ANXIETY: ICD-10-CM

## 2023-01-13 DIAGNOSIS — F32.A DEPRESSION, UNSPECIFIED DEPRESSION TYPE: ICD-10-CM

## 2023-01-13 DIAGNOSIS — E78.00 HYPERCHOLESTEROLEMIA: ICD-10-CM

## 2023-01-13 NOTE — TELEPHONE ENCOUNTER
PT was told at [facility] [phone] to question if the prescriptions listed below can be listed as tier exception because she never paid for these before    Clonazepam, omeprazole, venlafaxine 150, gabapentin

## 2023-01-16 RX ORDER — ATORVASTATIN CALCIUM 20 MG/1
20 TABLET, FILM COATED ORAL DAILY
Qty: 90 TABLET | Refills: 3 | Status: SHIPPED | OUTPATIENT
Start: 2023-01-16

## 2023-01-16 RX ORDER — OMEPRAZOLE 40 MG/1
40 CAPSULE, DELAYED RELEASE ORAL DAILY
Qty: 90 CAPSULE | Refills: 3 | Status: SHIPPED | OUTPATIENT
Start: 2023-01-16

## 2023-01-16 RX ORDER — GABAPENTIN 300 MG/1
300 CAPSULE ORAL 3 TIMES DAILY
Qty: 270 CAPSULE | Refills: 3 | Status: SHIPPED | OUTPATIENT
Start: 2023-01-16

## 2023-01-16 RX ORDER — CLONAZEPAM 0.5 MG/1
0.5 TABLET ORAL
Qty: 90 TABLET | Refills: 1 | Status: SHIPPED | OUTPATIENT
Start: 2023-01-16

## 2023-01-16 RX ORDER — CELECOXIB 200 MG/1
200 CAPSULE ORAL DAILY
Qty: 90 CAPSULE | Refills: 1 | Status: SHIPPED | OUTPATIENT
Start: 2023-01-16

## 2023-01-16 RX ORDER — VENLAFAXINE HYDROCHLORIDE 150 MG/1
150 CAPSULE, EXTENDED RELEASE ORAL EVERY MORNING
Qty: 90 CAPSULE | Refills: 3 | Status: SHIPPED | OUTPATIENT
Start: 2023-01-16

## 2023-01-16 RX ORDER — VENLAFAXINE HYDROCHLORIDE 75 MG/1
75 CAPSULE, EXTENDED RELEASE ORAL EVERY EVENING
Qty: 90 CAPSULE | Refills: 3 | Status: SHIPPED | OUTPATIENT
Start: 2023-01-16

## 2023-01-16 NOTE — TELEPHONE ENCOUNTER
[id number] 10/19/2022  10/19/2022 clonazePAM (Tablet)  90 0 90 0 5 MG RENNY SALCIDO POGODZINSKI  [facility] 0 / 1 [place]     1  [id number] 07/10/2022  04/22/2022 clonazePAM (Tablet)  90 0 90 0 5 MG RENNY SALCIDO POGODZINSKI  [facility] 1 / 1 [place]    1  [id number] 04/22/2022 04/22/2022 clonazePAM (Tablet)  90 0 90 0 5 MG RENNY SALCIDO POGODZINSKI  [facility] 0 / 1 [place]

## 2023-02-03 DIAGNOSIS — F41.9 ANXIETY: ICD-10-CM

## 2023-02-03 RX ORDER — CLONAZEPAM 0.5 MG/1
0.5 TABLET ORAL
Qty: 90 TABLET | Refills: 1 | Status: SHIPPED | OUTPATIENT
Start: 2023-02-03

## 2023-02-06 DIAGNOSIS — M70.62 TROCHANTERIC BURSITIS OF LEFT HIP: ICD-10-CM

## 2023-02-06 DIAGNOSIS — F32.A DEPRESSION, UNSPECIFIED DEPRESSION TYPE: ICD-10-CM

## 2023-02-06 DIAGNOSIS — K21.9 GASTROESOPHAGEAL REFLUX DISEASE: ICD-10-CM

## 2023-02-06 DIAGNOSIS — M51.16 LUMBAR DISC DISEASE WITH RADICULOPATHY: ICD-10-CM

## 2023-02-06 DIAGNOSIS — G89.4 CHRONIC PAIN SYNDROME: ICD-10-CM

## 2023-02-06 DIAGNOSIS — E78.00 HYPERCHOLESTEROLEMIA: ICD-10-CM

## 2023-02-07 RX ORDER — CELECOXIB 200 MG/1
200 CAPSULE ORAL DAILY
Qty: 90 CAPSULE | Refills: 1 | OUTPATIENT
Start: 2023-02-07

## 2023-02-07 RX ORDER — GABAPENTIN 300 MG/1
300 CAPSULE ORAL 3 TIMES DAILY
Qty: 270 CAPSULE | Refills: 3 | OUTPATIENT
Start: 2023-02-07

## 2023-02-07 RX ORDER — VENLAFAXINE HYDROCHLORIDE 150 MG/1
150 CAPSULE, EXTENDED RELEASE ORAL EVERY MORNING
Qty: 90 CAPSULE | Refills: 3 | OUTPATIENT
Start: 2023-02-07

## 2023-02-07 RX ORDER — OMEPRAZOLE 40 MG/1
40 CAPSULE, DELAYED RELEASE ORAL DAILY
Qty: 90 CAPSULE | Refills: 3 | OUTPATIENT
Start: 2023-02-07

## 2023-02-07 RX ORDER — VENLAFAXINE HYDROCHLORIDE 75 MG/1
75 CAPSULE, EXTENDED RELEASE ORAL EVERY EVENING
Qty: 90 CAPSULE | Refills: 3 | OUTPATIENT
Start: 2023-02-07

## 2023-02-07 RX ORDER — ATORVASTATIN CALCIUM 20 MG/1
20 TABLET, FILM COATED ORAL DAILY
Qty: 90 TABLET | Refills: 3 | OUTPATIENT
Start: 2023-02-07

## 2023-02-09 NOTE — TELEPHONE ENCOUNTER
Patient called and wanted to confirm receipt of prescription that was sent to [facility]- [facility] have [facility] [place] [facility]  Patient is going to contact [place] [facility]

## 2023-02-13 DIAGNOSIS — E78.00 HYPERCHOLESTEROLEMIA: ICD-10-CM

## 2023-02-13 DIAGNOSIS — M70.62 TROCHANTERIC BURSITIS OF LEFT HIP: ICD-10-CM

## 2023-02-13 DIAGNOSIS — M51.16 LUMBAR DISC DISEASE WITH RADICULOPATHY: ICD-10-CM

## 2023-02-13 DIAGNOSIS — G89.4 CHRONIC PAIN SYNDROME: ICD-10-CM

## 2023-02-13 DIAGNOSIS — F32.A DEPRESSION, UNSPECIFIED DEPRESSION TYPE: ICD-10-CM

## 2023-02-13 RX ORDER — VENLAFAXINE HYDROCHLORIDE 150 MG/1
150 CAPSULE, EXTENDED RELEASE ORAL EVERY MORNING
Qty: 90 CAPSULE | Refills: 3 | OUTPATIENT
Start: 2023-02-13

## 2023-02-13 RX ORDER — ATORVASTATIN CALCIUM 20 MG/1
20 TABLET, FILM COATED ORAL DAILY
Qty: 90 TABLET | Refills: 3 | OUTPATIENT
Start: 2023-02-13

## 2023-02-13 RX ORDER — CELECOXIB 200 MG/1
200 CAPSULE ORAL DAILY
Qty: 90 CAPSULE | Refills: 1 | OUTPATIENT
Start: 2023-02-13

## 2023-02-13 RX ORDER — GABAPENTIN 300 MG/1
300 CAPSULE ORAL 3 TIMES DAILY
Qty: 270 CAPSULE | Refills: 3 | OUTPATIENT
Start: 2023-02-13

## 2023-04-12 DIAGNOSIS — E78.00 HYPERCHOLESTEROLEMIA: ICD-10-CM

## 2023-04-12 DIAGNOSIS — M81.0 AGE-RELATED OSTEOPOROSIS WITHOUT CURRENT PATHOLOGICAL FRACTURE: ICD-10-CM

## 2023-04-12 DIAGNOSIS — G89.4 CHRONIC PAIN SYNDROME: ICD-10-CM

## 2023-04-12 DIAGNOSIS — K21.9 GASTROESOPHAGEAL REFLUX DISEASE: ICD-10-CM

## 2023-04-12 DIAGNOSIS — F32.A DEPRESSION, UNSPECIFIED DEPRESSION TYPE: ICD-10-CM

## 2023-04-12 DIAGNOSIS — J30.9 ALLERGIC RHINITIS, UNSPECIFIED SEASONALITY, UNSPECIFIED TRIGGER: ICD-10-CM

## 2023-04-12 DIAGNOSIS — M51.16 LUMBAR DISC DISEASE WITH RADICULOPATHY: ICD-10-CM

## 2023-04-12 DIAGNOSIS — M70.62 TROCHANTERIC BURSITIS OF LEFT HIP: ICD-10-CM

## 2023-04-12 RX ORDER — IBANDRONATE SODIUM 150 MG/1
150 TABLET, FILM COATED ORAL
Qty: 3 TABLET | Refills: 3 | Status: SHIPPED | OUTPATIENT
Start: 2023-04-12

## 2023-04-12 RX ORDER — GABAPENTIN 300 MG/1
300 CAPSULE ORAL 3 TIMES DAILY
Qty: 270 CAPSULE | Refills: 3 | Status: SHIPPED | OUTPATIENT
Start: 2023-04-12

## 2023-04-12 RX ORDER — OMEPRAZOLE 40 MG/1
40 CAPSULE, DELAYED RELEASE ORAL DAILY
Qty: 90 CAPSULE | Refills: 3 | Status: SHIPPED | OUTPATIENT
Start: 2023-04-12

## 2023-04-12 RX ORDER — FLUTICASONE PROPIONATE 50 MCG
2 SPRAY, SUSPENSION (ML) NASAL DAILY
Qty: 48 G | Refills: 3 | Status: SHIPPED | OUTPATIENT
Start: 2023-04-12

## 2023-04-12 RX ORDER — VENLAFAXINE HYDROCHLORIDE 75 MG/1
75 CAPSULE, EXTENDED RELEASE ORAL EVERY EVENING
Qty: 90 CAPSULE | Refills: 3 | Status: SHIPPED | OUTPATIENT
Start: 2023-04-12

## 2023-04-12 RX ORDER — ATORVASTATIN CALCIUM 20 MG/1
20 TABLET, FILM COATED ORAL DAILY
Qty: 90 TABLET | Refills: 3 | Status: SHIPPED | OUTPATIENT
Start: 2023-04-12

## 2023-04-12 RX ORDER — VENLAFAXINE HYDROCHLORIDE 150 MG/1
150 CAPSULE, EXTENDED RELEASE ORAL EVERY MORNING
Qty: 90 CAPSULE | Refills: 3 | Status: SHIPPED | OUTPATIENT
Start: 2023-04-12

## 2023-04-12 RX ORDER — CELECOXIB 200 MG/1
200 CAPSULE ORAL DAILY
Qty: 90 CAPSULE | Refills: 1 | Status: SHIPPED | OUTPATIENT
Start: 2023-04-12 | End: 2023-09-01

## 2023-07-06 ENCOUNTER — TELEPHONE (OUTPATIENT)
Dept: FAMILY MEDICINE CLINIC | Facility: CLINIC | Age: 68
End: 2023-07-06

## 2023-07-06 NOTE — TELEPHONE ENCOUNTER
Message left on outlook. Please reach out. I am unsure what paperwork they are referring to. Please obtain. Hi there. This is Sina with Karen on mutual patient Keny Sizer. Eight. Oh, four, twelve thirteen nineteen fifty three. Again, it's twelve thirteen nineteen fifty three. Actually on this patient I've received a prescription approval from Doctor Jamaica Posada and there were some additional paper required from the insurance that I faxed over to the office to fill out call attendant form back on twenty seventh of last month. That is going through my records here and I haven't received the addendum form back. It's been quite a while now that those forms are initially faxed. So if you can please, I'll check on this and give me a call at eight six, six, three, three, seven, two zero, five, five. Again it's eight six, six, three three seven, two zero, five five. And once you called us officer made a file and you will be directed towards me also to be on a safe side. I'll be refactoring the addendum form today by using your fax number I have here is eight three, three, eight two zero, one zero, zero four. In case if you haven't got the previous ones you'll be getting it today. So please have it completed and fax it back to me so that I can update my records and all the instructions to fill out. Those forms are mentioned on the cover sheet.  Thank you

## 2023-07-14 NOTE — TELEPHONE ENCOUNTER
Message left again via [employer],  Any update on [employer]. Hi there This is [facility] with [employer]. I'm calling regards to mutual patient Sg Moreno. Date of [birth year]. Again it's [birth year]. Actually on this patient I've received a prescription approval from Doctor Juan José Patterson and there were some additional paperwork required from the insurance that have occurred to the office to filled out call addendum form. It falls back on twenty seventh of last month month. Since since then I've been calling for this one. I didn't form to be completed and faxed back to me. But I still haven't got them back. And today is the last day for this week. So I need to update my records by today. So please look on this and if you guys haven't received the form so please give me a call back so that I can re fax it to you. My callback number is eight six, six three three seven two zero five five. Again it's eight six six three three seven two zero five five. And once you call the social media file and you will be directed towards me.  Thank you

## 2023-08-01 DIAGNOSIS — F41.9 ANXIETY: ICD-10-CM

## 2023-08-02 NOTE — TELEPHONE ENCOUNTER
[id number] 07/06/2023 02/03/2023 clonazePAM (Tablet) 30.0 30 0.5 MG RENNY SALCIDO POGODZINSKI [facility] Medicare 5 / 5 [place]     1 [id number] 06/14/2023 02/03/2023 clonazePAM (Tablet) 30.0 30 0.5 MG RENNY [facility]CIDO [facility] Medicare 4 / 5 [place]    1 [id number] 05/12/2023 02/03/2023 clonazePAM (Tablet) 30.0 30 0.5 MG RENNY SALCIDO BELKYS [facility] Medicare 3 / 5 [place]    1 [id number] 04/08/2023 02/03/2023 clonazePAM (Tablet) 30.0 30 0.5 MG RENNY SALCIDO BELK[facility] Medicare 2 / 5 [place]    1 [id number] 02/27/2023 02/03/2023 clonazePAM (Tablet) 30.0 30 0.5 MG RENNY SALCIDO [facility]  Medicare 1 / 5 [place]

## 2023-08-03 RX ORDER — CLONAZEPAM 0.5 MG/1
0.5 TABLET ORAL
Qty: 30 TABLET | Refills: 0 | Status: SHIPPED | OUTPATIENT
Start: 2023-08-03

## 2023-09-01 DIAGNOSIS — M51.16 LUMBAR DISC DISEASE WITH RADICULOPATHY: ICD-10-CM

## 2023-09-01 DIAGNOSIS — M70.62 TROCHANTERIC BURSITIS OF LEFT HIP: ICD-10-CM

## 2023-09-01 RX ORDER — CELECOXIB 200 MG/1
200 CAPSULE ORAL DAILY
Qty: 90 CAPSULE | Refills: 1 | Status: SHIPPED | OUTPATIENT
Start: 2023-09-01

## 2023-10-19 DIAGNOSIS — F41.9 ANXIETY: ICD-10-CM

## 2023-10-19 NOTE — TELEPHONE ENCOUNTER
1 [id number] 09/06/2023 08/03/2023 clonazePAM (Tablet) 30.0 30 0.5 MG RENNY SALCIDO BELKYS [facility]. Medicare 1 / 1 [place]    1 [id number] 08/04/2023 08/03/2023 clonazePAM (Tablet) 30.0 30 0.5 MG RENNY SALCIDO [facility] 0 / 1 [place]    1 [id number] 07/06/2023 02/03/2023 clonazePAM (Tablet) 30.0 30 0.5 MG RENNY SALCIDO [facility] Medicare 5 / 5 [place]    1 [id number] 06/14/2023 02/03/2023 clonazePAM (Tablet) 30.0 30 0.5 MG RENNY SALCIDO [facility]. Medicare 4 / 5 [place]    1 [id number] 05/12/2023 02/03/2023 clonazePAM (Tablet) 30.0 30 0.5 MG RENNY SALCIDO BELKYS [facility], [facility]. Medicare 3 / 5 [place]    1 [id number] 04/08/2023 02/03/2023 clonazePAM (Tablet) 30.0 30 0.5 MG RENNY SALCIDO [facility]. Medicare 2 / 5 [place]    1 [id number] 02/27/2023 02/03/2023 clonazePAM (Tablet) 30.0 30 0.5 MG RENNY SALCIDO BELKYS [facility], [facility]. Medicare 1 / 5 [place]    1 [id number] 02/04/2023 02/03/2023 clonazePAM (Tablet) 30.0 30 0.5 MG RENNY SALCIDO [facility] Medicare 0 / 5 [place]    1 [id number] 10/19/2022 10/19/2022 clonazePAM (Tablet) 90.0 90 0.5 MG RENNY SALCIDO [facility]OGODZINSKI [facility] 0 / 1 [place]

## 2023-10-20 RX ORDER — CLONAZEPAM 0.5 MG/1
0.5 TABLET ORAL
Qty: 30 TABLET | Refills: 1 | Status: SHIPPED | OUTPATIENT
Start: 2023-10-20

## 2023-11-01 ENCOUNTER — RA CDI HCC (OUTPATIENT)
Dept: OTHER | Facility: HOSPITAL | Age: 68
End: 2023-11-01

## 2023-11-01 NOTE — PROGRESS NOTES
[address] [facility] coding opportunities          Chart Reviewed number of suggestions sent to Provider: 1  [facility]        Patients Insurance        Commercial Insurance: [employer]

## 2024-01-09 ENCOUNTER — OFFICE VISIT (OUTPATIENT)
Dept: FAMILY MEDICINE CLINIC | Facility: CLINIC | Age: 69
End: 2024-01-09

## 2024-01-09 VITALS
TEMPERATURE: 96.7 F | OXYGEN SATURATION: 99 % | HEART RATE: 87 BPM | HEIGHT: 64 IN | WEIGHT: 145 LBS | SYSTOLIC BLOOD PRESSURE: 124 MMHG | DIASTOLIC BLOOD PRESSURE: 80 MMHG | BODY MASS INDEX: 24.75 KG/M2

## 2024-01-09 DIAGNOSIS — F33.1 MODERATE EPISODE OF RECURRENT MAJOR DEPRESSIVE DISORDER (HCC): ICD-10-CM

## 2024-01-09 DIAGNOSIS — M54.2 CERVICALGIA: ICD-10-CM

## 2024-01-09 DIAGNOSIS — M51.16 LUMBAR DISC DISEASE WITH RADICULOPATHY: ICD-10-CM

## 2024-01-09 DIAGNOSIS — M25.511 CHRONIC RIGHT SHOULDER PAIN: ICD-10-CM

## 2024-01-09 DIAGNOSIS — Z00.00 MEDICARE ANNUAL WELLNESS VISIT, SUBSEQUENT: Primary | ICD-10-CM

## 2024-01-09 DIAGNOSIS — E78.49 OTHER HYPERLIPIDEMIA: ICD-10-CM

## 2024-01-09 DIAGNOSIS — G89.29 CHRONIC RIGHT SHOULDER PAIN: ICD-10-CM

## 2024-01-09 DIAGNOSIS — Z12.31 SCREENING MAMMOGRAM FOR BREAST CANCER: ICD-10-CM

## 2024-01-09 RX ORDER — LANOLIN ALCOHOL/MO/W.PET/CERES
3 CREAM (GRAM) TOPICAL
COMMUNITY

## 2024-01-09 NOTE — PROGRESS NOTES
Assessment and Plan:     Problem List Items Addressed This Visit          Nervous and Auditory    Lumbar disc disease with radiculopathy     Chronic, unchanged. Still with discomfort, but not worsening. Refer to PT.  Avoid exacerbating positions/activities. Recheck 6m         Relevant Orders    Ambulatory Referral to Physical Therapy       Other    Cervicalgia     Unchanged, but not worsening. Refer to PT. Recheck 6m         Relevant Orders    Ambulatory Referral to Physical Therapy    Chronic right shoulder pain     Upper arm pain appears to be related to her shoulders.  Has good ROM but discomfort with movement. Refer to PT.          Relevant Orders    Ambulatory Referral to Physical Therapy    Moderate episode of recurrent major depressive disorder (HCC)     Depression Screening Follow-up Plan: Patient's depression screening was positive with a PHQ-2 score of . Their PHQ-9 score was 12. I reviewed with pt. Mood worsened by grief over loss of pet. Discussed with pt. Will monitor for now. Continue Effexor at present dose. Recheck 1m if not improving - otherwise recheck 6m         Relevant Orders    CBC and differential    Comprehensive metabolic panel    TSH, 3rd generation with Free T4 reflex    Other hyperlipidemia     Check labs. Continue atorvastatin 20mgqd - adjust dose if not at goal. Recheck 6m         Relevant Orders    Comprehensive metabolic panel    Lipid panel     Other Visit Diagnoses       Medicare annual wellness visit, subsequent    -  Primary    Screening mammogram for breast cancer        Relevant Orders    Mammo screening bilateral w 3d & cad               Preventive health issues were discussed with patient, and age appropriate screening tests were ordered as noted in patient's After Visit Summary.  Personalized health advice and appropriate referrals for health education or preventive services given if needed, as noted in patient's After Visit Summary.     History of Present Illness:  "    Patient presents for a Medicare Wellness Visit    f/u multiple med issues and AWV  - mood sl worse since 12/27 when she lost a pet. Struggling with mood/motivation.   - pt with 2m hx of R upper arm pain primarily at night. Nothing seems to make it better or worse. Pt with chronic neck issues  - pt finds that her hair \"comes out\" when she brushes it for a whol;e. No focal areas of hair loss - seems to be diffuse.   - pt notes \"sores\" in her nose over the last year. Comes and goes. No bleeding  - pt with L volar forearm burn just before Christmas. Has not healed yet  - pt denies CP, palpitations, lightheadedness or other CV symptoms with or without exertion  - back remains painful. . Pt does get some low back pain with walking. Some radiation to thighs.  Did not get repeat injections in her back and has not been back to pain management. .  - pt with intermittent stomach upset. No change in Bms.   - due mammogram  - AWV done       Patient Care Team:  Dajuan Goodman MD as PCP - General  Peewee Stanley MD     Review of Systems:     Review of Systems     Problem List:     Patient Active Problem List   Diagnosis    Allergic rhinitis    Anxiety    Elevated TSH    Fatigue    Gastroesophageal reflux disease    Headache    Imbalance    Lower back pain    Mild episode of recurrent major depressive disorder (HCC)    Osteoarthritis    Other hyperlipidemia    Anemia    Status post left femoral intramedullary nail    Trochanteric bursitis of left hip    Lumbar disc disease with radiculopathy    Diarrhea    Screening for colorectal cancer    Chronic pain syndrome    Cervicalgia    Paresthesias    COVID    Chronic right shoulder pain    Moderate episode of recurrent major depressive disorder (HCC)      Past Medical and Surgical History:     Past Medical History:   Diagnosis Date    Anxiety     Depression     Fractures     GERD (gastroesophageal reflux disease)     Hyperlipidemia     Low back pain     Panic attacks     " Psychiatric disorder     anxiety, depression     Past Surgical History:   Procedure Laterality Date    BLADDER SUSPENSION      Mesh    CHOLECYSTECTOMY      COLONOSCOPY      FRACTURE SURGERY Left     GALLBLADDER SURGERY      SC LAPAROSCOPY SURG CHOLECYSTECTOMY N/A 2016    Procedure: LAPAROSCOPIC CHOLECYSTECTOMY ;  Surgeon: Henok Knutson DO;  Location: [facility];  Service: General. Last assessed: 16    SC OPTX FEM SHFT FX W/INSJ IMED IMPLT W/WO SCREW Left 3/1/2020    Procedure: INSERTION NAIL IM FEMUR ANTEGRADE (TROCHANTERIC);  Surgeon: Segun Alvarez MD;  Location: [facility];  Service: Orthopedics    TUBAL LIGATION      URETHRA SURGERY        Family History:     Family History   Problem Relation Age of Onset    Other Mother         Methicillin Resistant Staphylococcus Aureus Infection    Mental illness Mother     Alzheimer's disease Mother     Lung cancer Father     Ovarian cancer Sister     No Known Problems Sister     No Known Problems Daughter     No Known Problems Daughter     No Known Problems Maternal Grandmother     No Known Problems Maternal Grandfather     Stomach cancer Paternal Grandmother     No Known Problems Paternal Grandfather     Alzheimer's disease Other     No Known Problems Maternal Aunt     No Known Problems Maternal Aunt     No Known Problems Maternal Aunt     No Known Problems Paternal Aunt     No Known Problems Paternal Aunt     No Known Problems Paternal Aunt     No Known Problems Paternal Aunt     No Known Problems Paternal Aunt       Social History:     Social History     Socioeconomic History    Marital status:      Spouse name: None    Number of children: None    Years of education: 12    Highest education level: None   Occupational History    Occupation: Retired   Tobacco Use    Smoking status: Former     Current packs/day: 0.00     Types: Cigarettes     Quit date: 2004     Years since quittin.7    Smokeless tobacco: Never   Vaping Use    Vaping  status: Never Used   Substance and Sexual Activity    Alcohol use: Never     Alcohol/week: 0.0 standard drinks of alcohol    Drug use: No    Sexual activity: None   Other Topics Concern    None   Social History Narrative    Always uses setbelt    Denied History of Dental care: regularly    Does not exercise    Multiple organ donor    No guns in home    No living will    Pets in the home    Denied history of Power of  in existence    Tea    Water intake, adequate (per day)    Denied history of daily cola consumption     Social Determinants of Health     Financial Resource Strain: Low Risk  (1/9/2024)    Overall Financial Resource Strain (CARDIA)     Difficulty of Paying Living Expenses: Not hard at all   Food Insecurity: Not on file   Transportation Needs: No Transportation Needs (1/9/2024)    PRAPARE - Transportation     Lack of Transportation (Medical): No     Lack of Transportation (Non-Medical): No   Physical Activity: Not on file   Stress: Not on file   Social Connections: Not on file   Intimate Partner Violence: Not on file   Housing Stability: Not on file      Medications and Allergies:     Current Outpatient Medications   Medication Sig Dispense Refill    atorvastatin (LIPITOR) 20 mg tablet Take 1 tablet (20 mg total) by mouth daily 90 tablet 3    Calcium 600 MG tablet Take 600 mg by mouth daily       celecoxib (CeleBREX) 200 mg capsule TAKE 1 CAPSULE EVERY DAY 90 capsule 1    Cholecalciferol (VITAMIN D3) 1000 units CAPS Take 1,000 Units by mouth daily       clonazePAM (KlonoPIN) 0.5 mg tablet TAKE 1 TABLET AT BEDTIME 30 tablet 1    cyanocobalamin 1000 MCG tablet Take 1,000 mcg by mouth daily       fluticasone (FLONASE) 50 mcg/act nasal spray 2 sprays into each nostril daily 48 g 3    gabapentin (NEURONTIN) 300 mg capsule Take 1 capsule (300 mg total) by mouth 3 (three) times a day (Patient taking differently: Take 300 mg by mouth 2 (two) times a day) 270 capsule 3    ibandronate (BONIVA) 150 MG  tablet Take 1 tablet (150 mg total) by mouth every 30 (thirty) days 3 tablet 3    lidocaine (LIDODERM) 5 % Apply 1 patch topically daily Remove & Discard patch within 12 hours or as directed by MD 30 patch 0    loratadine (CLARITIN) 10 mg tablet Take 1 tablet (10 mg total) by mouth daily 90 tablet 1    Magnesium Oxide -Mg Supplement 400 MG CAPS Take 1 capsule by mouth daily      melatonin 3 mg Take 3 mg by mouth daily at bedtime      Multiple Vitamin (MULTI-VITAMIN DAILY) TABS Take by mouth      Omega-3 Fatty Acids (FISH OIL PO) Take by mouth      omeprazole (PriLOSEC) 40 MG capsule Take 1 capsule (40 mg total) by mouth daily 90 capsule 3    venlafaxine (EFFEXOR-XR) 150 mg 24 hr capsule Take 1 capsule (150 mg total) by mouth every morning 90 capsule 3    venlafaxine (EFFEXOR-XR) 75 mg 24 hr capsule Take 1 capsule (75 mg total) by mouth every evening 90 capsule 3     No current facility-administered medications for this visit.     Allergies   Allergen Reactions    Aspartame - Food Allergy Headache    Vancomycin Hives    Penicillins Hives and Rash      Immunizations:     Immunization History   Administered Date(s) Administered    Pneumococcal Conjugate 13-Valent 08/24/2016    Pneumococcal Polysaccharide PPV23 03/21/2017    Tdap 12/13/2011    Zoster 09/18/2016      Health Maintenance:         Topic Date Due    Breast Cancer Screening: Mammogram  08/30/2023    DXA SCAN  08/30/2025    Colorectal Cancer Screening  11/25/2025    Hepatitis C Screening  Completed         Topic Date Due    COVID-19 Vaccine (1) Never done    Pneumococcal Vaccine: 65+ Years (3 - PPSV23 or PCV20) 03/21/2022    Influenza Vaccine (1) Never done      Medicare Screening Tests and Risk Assessments:     Aditi is here for her Subsequent Wellness visit. Last Medicare Wellness visit information reviewed, patient interviewed and updates made to the record today.      Health Risk Assessment:   Patient rates overall health as poor. Patient feels that their  physical health rating is much worse. Patient is satisfied with their life. Eyesight was rated as same. Hearing was rated as same. Patient feels that their emotional and mental health rating is same. Patients states they are never, rarely angry. Patient states they are always unusually tired/fatigued. Pain experienced in the last 7 days has been a lot. Patient's pain rating has been 7/10. Patient states that she has experienced no weight loss or gain in last 6 months. Neck and back    Depression Screening:   PHQ-9 Score: 12      Fall Risk Screening:   In the past year, patient has experienced: history of falling in past year    Number of falls: 2 or more  Injured during fall?: No    Feels unsteady when standing or walking?: Yes    Worried about falling?: Yes      Urinary Incontinence Screening:   Patient has not leaked urine accidently in the last six months.     Home Safety:  Patient does not have trouble with stairs inside or outside of their home. Patient has working smoke alarms and has working carbon monoxide detector. Home safety hazards include: none.     Nutrition:   Current diet is Unhealthy and Regular. Only eats breakfast and dinner    Medications:   Patient is currently taking over-the-counter supplements. OTC medications include: see medication list. Patient is able to manage medications.     Activities of Daily Living (ADLs)/Instrumental Activities of Daily Living (IADLs):   Walk and transfer into and out of bed and chair?: Yes  Dress and groom yourself?: Yes    Bathe or shower yourself?: Yes    Feed yourself? Yes  Do your laundry/housekeeping?: Yes  Manage your money, pay your bills and track your expenses?: Yes  Make your own meals?: Yes    Do your own shopping?: Yes    Previous Hospitalizations:   Any hospitalizations or ED visits within the last 12 months?: No      Advance Care Planning:   Living will: Yes    Durable POA for healthcare: Yes    Advanced directive: Yes    Advanced directive  counseling given: Yes      Cognitive Screening:   Provider or family/friend/caregiver concerned regarding cognition?: No    PREVENTIVE SCREENINGS      Cardiovascular Screening:    General: Screening Not Indicated and History Lipid Disorder      Diabetes Screening:     General: Risks and Benefits Discussed    Due for: Blood Glucose      Colorectal Cancer Screening:     General: Screening Current      Breast Cancer Screening:     General: Screening Current      Cervical Cancer Screening:    General: Screening Not Indicated      Osteoporosis Screening:    General: Screening Not Indicated and History Osteoporosis      Abdominal Aortic Aneurysm (AAA) Screening:        General: Screening Not Indicated      Lung Cancer Screening:     General: Screening Not Indicated      Hepatitis C Screening:    General: Screening Current    Screening, Brief Intervention, and Referral to Treatment (SBIRT)    Screening      AUDIT-C Screenin) How often did you have a drink containing alcohol in the past year? never  2) How many drinks did you have on a typical day when you were drinking in the past year? 0  3) How often did you have 6 or more drinks on one occasion in the past year? never    AUDIT-C Score: 0  Interpretation: Score 0-2 (female): Negative screen for alcohol misuse    Single Item Drug Screening:  How often have you used an illegal drug (including marijuana) or a prescription medication for non-medical reasons in the past year? never    Single Item Drug Screen Score: 0  Interpretation: Negative screen for possible drug use disorder    Brief Intervention  Alcohol cessation counseling given. Community resources provided.     Time Spent  Time spent screening/evaluating the patient for alcohol misuse: 5 minutes.     Other Counseling Topics:   Calcium and vitamin D intake and regular weightbearing exercise.     No results found.     Physical Exam:     /80 (BP Location: Right arm, Patient Position: Sitting, Cuff Size:  "Adult)   Pulse 87   Temp (!) 96.7 °F (35.9 °C)   Ht 5' 4.25\" (1.632 m)   Wt 65.8 kg (145 lb)   SpO2 99%   BMI 24.70 kg/m²     Physical Exam  HENT:      Head: Normocephalic.      Right Ear: Tympanic membrane, ear canal and external ear normal.      Left Ear: Tympanic membrane, ear canal and external ear normal.      Nose: Nose normal.      Mouth/Throat:      Mouth: Mucous membranes are moist.   Eyes:      General: No scleral icterus.     Extraocular Movements: Extraocular movements intact.      Conjunctiva/sclera: Conjunctivae normal.      Pupils: Pupils are equal, round, and reactive to light.   Neck:      Vascular: No carotid bruit.   Cardiovascular:      Rate and Rhythm: Normal rate and regular rhythm.      Pulses: Normal pulses.   Pulmonary:      Effort: Pulmonary effort is normal.      Breath sounds: No wheezing or rales.   Abdominal:      General: There is no distension.      Palpations: There is no mass.      Tenderness: There is no abdominal tenderness.   Musculoskeletal:         General: Tenderness (over posterior cervical and lumbar spine and paraspinal muscles. Discomfort with abduction and anterior flexion.  Worse with empty can test) and deformity (arthritic changes. Increased thoracic spine kyphosis) present.      Cervical back: No tenderness.      Right lower leg: No edema.      Left lower leg: No edema.   Lymphadenopathy:      Cervical: No cervical adenopathy.   Skin:     General: Skin is warm.      Capillary Refill: Capillary refill takes less than 2 seconds.   Neurological:      General: No focal deficit present.      Mental Status: She is alert and oriented to person, place, and time.      Cranial Nerves: No cranial nerve deficit.      Sensory: No sensory deficit.      Motor: No weakness.      Gait: Gait normal.   Psychiatric:         Behavior: Behavior normal.         Thought Content: Thought content normal.         Judgment: Judgment normal.      Comments: PHQ-2/9 Depression Screening  "   Little interest or pleasure in doing things: 2 - more than half the days  Feeling down, depressed, or hopeless: 2 - more than half the days  Trouble falling or staying asleep, or sleeping too much: 2 - more than   half the days  Feeling tired or having little energy: 2 - more than half the days  Poor appetite or overeatin - several days  Feeling bad about yourself - or that you are a failure or have let   yourself or your family down: 1 - several days  Trouble concentrating on things, such as reading the newspaper or watching   television: 1 - several days  Moving or speaking so slowly that other people could have noticed. Or the   opposite - being so fidgety or restless that you have been moving around a   lot more than usual: 1 - several days  Thoughts that you would be better off dead, or of hurting yourself in some   way: 0 - not at all  PHQ-9 Score: 12  PHQ-9 Interpretation: Moderate depression               Dajuan Goodman MD

## 2024-01-11 PROBLEM — Z48.89 AFTERCARE FOLLOWING SURGERY: Status: RESOLVED | Noted: 2020-05-22 | Resolved: 2024-01-11

## 2024-01-11 PROBLEM — M25.511 CHRONIC RIGHT SHOULDER PAIN: Status: ACTIVE | Noted: 2024-01-11

## 2024-01-11 PROBLEM — F33.1 MODERATE EPISODE OF RECURRENT MAJOR DEPRESSIVE DISORDER (HCC): Status: ACTIVE | Noted: 2024-01-11

## 2024-01-11 PROBLEM — S72.042A: Status: RESOLVED | Noted: 2020-02-29 | Resolved: 2024-01-11

## 2024-01-11 PROBLEM — G89.29 CHRONIC RIGHT SHOULDER PAIN: Status: ACTIVE | Noted: 2024-01-11

## 2024-01-11 PROBLEM — S72.143A INTERTROCHANTERIC FRACTURE OF FEMUR (HCC): Status: RESOLVED | Noted: 2020-02-29 | Resolved: 2024-01-11

## 2024-01-11 NOTE — ASSESSMENT & PLAN NOTE
Chronic, unchanged. Still with discomfort, but not worsening. Refer to PT.  Avoid exacerbating positions/activities. Recheck 6m

## 2024-01-11 NOTE — ASSESSMENT & PLAN NOTE
Depression Screening Follow-up Plan: Patient's depression screening was positive with a PHQ-2 score of . Their PHQ-9 score was 12. I reviewed with pt. Mood worsened by grief over loss of pet. Discussed with pt. Will monitor for now. Continue Effexor at present dose. Recheck 1m if not improving - otherwise recheck 6m

## 2024-01-11 NOTE — ASSESSMENT & PLAN NOTE
Upper arm pain appears to be related to her shoulders.  Has good ROM but discomfort with movement. Refer to PT.

## 2024-01-18 ENCOUNTER — EVALUATION (OUTPATIENT)
Dept: PHYSICAL THERAPY | Facility: CLINIC | Age: 69
End: 2024-01-18
Payer: COMMERCIAL

## 2024-01-18 DIAGNOSIS — G89.4 CHRONIC PAIN SYNDROME: ICD-10-CM

## 2024-01-18 DIAGNOSIS — M54.2 CERVICALGIA: Primary | ICD-10-CM

## 2024-01-18 DIAGNOSIS — F41.9 ANXIETY: ICD-10-CM

## 2024-01-18 DIAGNOSIS — E78.00 HYPERCHOLESTEROLEMIA: ICD-10-CM

## 2024-01-18 DIAGNOSIS — M25.511 CHRONIC RIGHT SHOULDER PAIN: ICD-10-CM

## 2024-01-18 DIAGNOSIS — M51.16 LUMBAR DISC DISEASE WITH RADICULOPATHY: ICD-10-CM

## 2024-01-18 DIAGNOSIS — F32.A DEPRESSION, UNSPECIFIED DEPRESSION TYPE: ICD-10-CM

## 2024-01-18 DIAGNOSIS — G89.29 CHRONIC RIGHT SHOULDER PAIN: ICD-10-CM

## 2024-01-18 DIAGNOSIS — M81.0 AGE-RELATED OSTEOPOROSIS WITHOUT CURRENT PATHOLOGICAL FRACTURE: ICD-10-CM

## 2024-01-18 DIAGNOSIS — K21.9 GASTROESOPHAGEAL REFLUX DISEASE: ICD-10-CM

## 2024-01-18 DIAGNOSIS — J30.9 ALLERGIC RHINITIS, UNSPECIFIED SEASONALITY, UNSPECIFIED TRIGGER: ICD-10-CM

## 2024-01-18 PROCEDURE — 97162 PT EVAL MOD COMPLEX 30 MIN: CPT | Performed by: PHYSICAL THERAPIST

## 2024-01-18 PROCEDURE — 97112 NEUROMUSCULAR REEDUCATION: CPT | Performed by: PHYSICAL THERAPIST

## 2024-01-18 RX ORDER — CLONAZEPAM 0.5 MG/1
0.5 TABLET ORAL
Qty: 30 TABLET | Refills: 1 | Status: SHIPPED | OUTPATIENT
Start: 2024-01-18

## 2024-01-18 RX ORDER — FLUTICASONE PROPIONATE 50 MCG
2 SPRAY, SUSPENSION (ML) NASAL DAILY
Qty: 48 G | Refills: 1 | Status: SHIPPED | OUTPATIENT
Start: 2024-01-18

## 2024-01-18 RX ORDER — ATORVASTATIN CALCIUM 20 MG/1
20 TABLET, FILM COATED ORAL DAILY
Qty: 90 TABLET | Refills: 0 | Status: SHIPPED | OUTPATIENT
Start: 2024-01-18

## 2024-01-18 RX ORDER — VENLAFAXINE HYDROCHLORIDE 150 MG/1
150 CAPSULE, EXTENDED RELEASE ORAL EVERY MORNING
Qty: 90 CAPSULE | Refills: 1 | Status: SHIPPED | OUTPATIENT
Start: 2024-01-18

## 2024-01-18 RX ORDER — VENLAFAXINE HYDROCHLORIDE 75 MG/1
75 CAPSULE, EXTENDED RELEASE ORAL EVERY EVENING
Qty: 90 CAPSULE | Refills: 1 | Status: SHIPPED | OUTPATIENT
Start: 2024-01-18

## 2024-01-18 RX ORDER — OMEPRAZOLE 40 MG/1
40 CAPSULE, DELAYED RELEASE ORAL DAILY
Qty: 90 CAPSULE | Refills: 1 | Status: SHIPPED | OUTPATIENT
Start: 2024-01-18

## 2024-01-18 RX ORDER — GABAPENTIN 300 MG/1
300 CAPSULE ORAL 3 TIMES DAILY
Qty: 270 CAPSULE | Refills: 0 | Status: SHIPPED | OUTPATIENT
Start: 2024-01-18

## 2024-01-18 RX ORDER — IBANDRONATE SODIUM 150 MG/1
TABLET, FILM COATED ORAL
Qty: 3 TABLET | Refills: 0 | Status: SHIPPED | OUTPATIENT
Start: 2024-01-18

## 2024-01-18 NOTE — TELEPHONE ENCOUNTER
1 [id number] 12/18/2023 10/20/2023 clonazePAM (Tablet) 30.0 30 0.5 MG NA [facility] Medicare 2 / 2 [place]    1 [id number] 11/16/2023 10/20/2023 clonazePAM (Tablet) 30.0 30 0.5 MG NA [facility] Medicare 1 / 2 [place]    1 [id number] 10/24/2023 10/20/2023 clonazePAM (Tablet) 30.0 30 0.5 MG NA [facility] Medicare 0 / 2 [place]    1 [id number] 09/06/2023 08/03/2023 clonazePAM (Tablet) 30.0 30 0.5 MG [facility] 1 / 1 [place]    1 [id number] 08/04/2023 08/03/2023 clonazePAM (Tablet) 30.0 30 0.5 MG [facility] 0 / 1 [place]    1 [id number] 07/06/2023 02/03/2023 clonazePAM (Tablet) 30.0 30 0.5 MG [facility] 5 / 5 [place]    1 [id number] 06/14/2023 02/03/2023 clonazePAM (Tablet) 30.0 30 0.5 MG [facility] Medicare 4 / 5 [place]    1 [id number] 05/12/2023 02/03/2023 clonazePAM (Tablet) 30.0 30 0.5 MG [facility]ENNY [facility]IDO [facility] Medicare 3 / 5 [place]    1 [id number] 04/08/2023 02/03/2023 clonazePAM (Tablet) 30.0 30 0.5 MG RENNY ASLCIDO [facility] Medicare 2 / 5 [place]    1 [id number] 02/27/2023 02/03/2023 clonazePAM (Tablet) 30.0 30 0.5 MG RENNY SALCIDO [facility] Medicare 1 / 5 [place]

## 2024-01-18 NOTE — PROGRESS NOTES
PT EVALUATION    Today's date: 24  Patient name: Diane E Magill  : 1955  MRN: [id number]  Referring provider: Alfie Goodman*  Dx:   1. Cervicalgia    2. Chronic right shoulder pain    3. Lumbar disc disease with radiculopathy        ASSESSMENT:  Diane E Magill is a 68 y.o. female who presents with signs and symptoms consistent of chronic neck pain. The primary movement problem is related to joint hypomobility and muscle tension throughout the cervical spine, resulting in pathoanatomical symptoms of chronic neck pain and limiting patient's ability to perform normal daily activities.  Patient is neurologically intact and no further referral appears necessary at this time based upon examination results. Upon PT evaluation this date, patient demonstrates increased TTP in the cervical suboccipitals, scalenes, upper traps, and paraspinals. Patient demonstrated  joint hypomobility throughout the lower and mid cervical spine with most tender around CT junction. Overall, patien presents with pain, decreased strength, decreased ROM, decreased joint mobility, and postural dysfunction. Due to these impairments, Patient has difficulty performing a/iadls and recreational activities. Patient would benefit from skilled physical therapy to address the impairments, improve their level of function, and to improve their overall quality of life.        Impairments:    restricted ROM    decreased strength   pain with function   activity intolerance   abnormal gait   Postural dysfunction     Prognosis:  Fair  Positive and negative prognostic indicator(s):  pain >3 months and multiple comorbidities    Goals:    Short Term Goals: to be achieved by 4 weeks  1) Patient to be independent with basic HEP.  2) Decrease pain to 5/10 at its worst.  3) Increase cervical spine ROM by 5-10 degrees in all deficient planes.  4) Increase UE strength by 1/2 MMT grade in all deficient planes.  5) Improve joint mobility in cervical  spine to normal     Long Term Goals: to be achieved by discharge  1) FOTO equal to or greater than target score indicating improvements with overall function.  2) Patient to be independent with comprehensive HEP.  3) Cervical spine ROM WNL all planes to improve a/iadls.  4) Lifting is improved to maximal level of function       Planned interventions:  home exercise program, patient education, manual therapy, graded activity, flexibility, functional range of motion exercises, strengthening, abdominal trunk stabilization, low level laser with IASTM, and modalities prn    Duration in visits:  8-12  Frequency: 1-2 visits per week  Duration in weeks:  6-8    History of Current Injury: Patient notes that she has been having neck pain for years. Patient notes that doctor told her she has bone spurs in her neck. Patient notes that the pain has been getting worse over the last few months. Patient notes that the pain is fairly constant but is made worse with certain activities as well as stress and anxiety. Patient notes that the pain doesn't wake her up at night and she has no issues sleeping because of it. Patient denies headaches and dizziness associated with the headaches. Patient denies an RUSS and it was more of a increase over years. Patient notes that the pain is fairly localized and denies having radicular symptoms. Patient notes that she does have some right shoulder pain but the doctor told her its her rotator cuff.       Pain location: midline cervical spine   Pain descriptors: tight   Pain at Currently:  8/10   Pain at Best:  4/10   Pain at Worst: 10/10       Aggravating factors: stress and anxiety levels, rotation or side-bending, lifting, prolong positions of the neck like while she is crocheting.   Easing factors: rest    Imaging: no recent imaging   Special Questions:   Dizziness: No  Dysphagia: No  Dysarthria: No  Diploplia: No  Drop Attacks: No  Numbness: No  Nausea: No  Nystagmus: No      Hobbies/Interests:  crocheting, journaling, cross stitch.   Occupation: n/a   Patient goals: Patient reports goals for physical therapy would be decreased pain        Objective     Concurrent Complaints  Negative for night pain, disturbed sleep, dizziness, faints, headaches, trouble swallowing and visual change    Palpation   Left   Hypertonic in the cervical paraspinals, scalenes, suboccipitals and upper trapezius.   Tenderness of the cervical paraspinals, scalenes, suboccipitals and upper trapezius.   Trigger point to scalenes and upper trapezius.     Right   Hypertonic in the cervical paraspinals, scalenes, suboccipitals and upper trapezius.   Tenderness of the cervical paraspinals, scalenes, suboccipitals and upper trapezius.   Trigger point to scalenes and upper trapezius.     Tenderness   Cervical Spine   Tenderness in the facet joint and spinous process.     Neurological Testing     Sensation   Cervical/Thoracic   Left   Intact: light touch    Right   Intact: light touch    Reflexes   Left   Biceps (C5/C6): normal (2+)  Brachioradialis (C6): normal (2+)    Right   Biceps (C5/C6): normal (2+)  Brachioradialis (C6): normal (2+)    Active Range of Motion   Cervical/Thoracic Spine       Cervical    Flexion: 30 degrees  WFL  Extension: 39 degrees     with pain  Left lateral flexion: 66 degrees     with pain  Right lateral flexion: 65 degrees     with pain  Left rotation: 10 degrees with pain  Right rotation: 15 degrees    with pain    Passive Range of Motion   Cervical/Thoracic Spine   Cervical     Flexion (degrees): 30  Extension (degrees): 39  Left lateral flexion (degrees): 65  Right lateral flexion (degrees): 65  Left rotation (degrees): 10  Right rotation (degrees): 15    Joint Play     Hypomobile: C3, C4, C5, C6, C7 and T1     Pain: C3, C4, C5, C6, C7 and T1     Strength/Myotome Testing   Cervical Spine     Left   Normal strength    Right   Normal strength    Tests   Cervical   Positive neck flexor muscle endurance  test.    Left   Positive cervical flexion-rotation test .   Neuro Exam:     Headaches   Patient reports headaches: No.           Precautions: anxiety, depression, imbalance, chronic pain       Manuals             Cervical manual stretching              Cervical STM              Gentle cervical mobs                           Neuro Re-Ed             Chin tucks supine (no at first: hold)             Supine AROM with holds              Cervical laser maze                                                                               Ther Ex             Sidebending SNAG HOLD            Rotational SNAG to the left HOLD             Extension SNAG  HOLD             Thoracic extension             Upper trap stretch (gentle to start)              Levator stretch (gentle to start)                                                                  Ther Activity                                       Gait Training                                       Modalities             Traction             MH

## 2024-01-23 ENCOUNTER — APPOINTMENT (OUTPATIENT)
Dept: PHYSICAL THERAPY | Facility: CLINIC | Age: 69
End: 2024-01-23
Payer: COMMERCIAL

## 2024-02-14 ENCOUNTER — APPOINTMENT (OUTPATIENT)
Dept: LAB | Facility: CLINIC | Age: 69
End: 2024-02-14
Payer: COMMERCIAL

## 2024-02-14 DIAGNOSIS — E78.49 OTHER HYPERLIPIDEMIA: ICD-10-CM

## 2024-02-14 DIAGNOSIS — F33.1 MODERATE EPISODE OF RECURRENT MAJOR DEPRESSIVE DISORDER (HCC): ICD-10-CM

## 2024-02-14 LAB
ALBUMIN SERPL BCP-MCNC: 4.1 G/DL (ref 3.5–5)
ALP SERPL-CCNC: 106 U/L (ref 34–104)
ALT SERPL W P-5'-P-CCNC: 8 U/L (ref 7–52)
ANION GAP SERPL CALCULATED.3IONS-SCNC: 9 MMOL/L
AST SERPL W P-5'-P-CCNC: 23 U/L (ref 13–39)
BASOPHILS # BLD AUTO: 0.06 THOUSANDS/ÂΜL (ref 0–0.1)
BASOPHILS NFR BLD AUTO: 1 % (ref 0–1)
BILIRUB SERPL-MCNC: 0.41 MG/DL (ref 0.2–1)
BUN SERPL-MCNC: 15 MG/DL (ref 5–25)
CALCIUM SERPL-MCNC: 8.9 MG/DL (ref 8.4–10.2)
CHLORIDE SERPL-SCNC: 103 MMOL/L (ref 96–108)
CHOLEST SERPL-MCNC: 212 MG/DL
CO2 SERPL-SCNC: 28 MMOL/L (ref 21–32)
CREAT SERPL-MCNC: 0.9 MG/DL (ref 0.6–1.3)
EOSINOPHIL # BLD AUTO: 0.14 THOUSAND/ÂΜL (ref 0–0.61)
EOSINOPHIL NFR BLD AUTO: 2 % (ref 0–6)
ERYTHROCYTE [DISTWIDTH] IN BLOOD BY AUTOMATED COUNT: 13.3 % (ref 11.6–15.1)
GFR SERPL CREATININE-BSD FRML MDRD: 64 ML/MIN/1.73SQ M
GLUCOSE P FAST SERPL-MCNC: 83 MG/DL (ref 65–99)
HCT VFR BLD AUTO: 43.9 % (ref 34.8–46.1)
HDLC SERPL-MCNC: 53 MG/DL
HGB BLD-MCNC: 13.4 G/DL (ref 11.5–15.4)
IMM GRANULOCYTES # BLD AUTO: 0.01 THOUSAND/UL (ref 0–0.2)
IMM GRANULOCYTES NFR BLD AUTO: 0 % (ref 0–2)
LDLC SERPL CALC-MCNC: 118 MG/DL (ref 0–100)
LYMPHOCYTES # BLD AUTO: 2.87 THOUSANDS/ÂΜL (ref 0.6–4.47)
LYMPHOCYTES NFR BLD AUTO: 45 % (ref 14–44)
MCH RBC QN AUTO: 29.1 PG (ref 26.8–34.3)
MCHC RBC AUTO-ENTMCNC: 30.5 G/DL (ref 31.4–37.4)
MCV RBC AUTO: 95 FL (ref 82–98)
MONOCYTES # BLD AUTO: 0.48 THOUSAND/ÂΜL (ref 0.17–1.22)
MONOCYTES NFR BLD AUTO: 8 % (ref 4–12)
NEUTROPHILS # BLD AUTO: 2.79 THOUSANDS/ÂΜL (ref 1.85–7.62)
NEUTS SEG NFR BLD AUTO: 44 % (ref 43–75)
NONHDLC SERPL-MCNC: 159 MG/DL
NRBC BLD AUTO-RTO: 0 /100 WBCS
PLATELET # BLD AUTO: 390 THOUSANDS/UL (ref 149–390)
PMV BLD AUTO: 10.1 FL (ref 8.9–12.7)
POTASSIUM SERPL-SCNC: 4 MMOL/L (ref 3.5–5.3)
PROT SERPL-MCNC: 6.8 G/DL (ref 6.4–8.4)
RBC # BLD AUTO: 4.6 MILLION/UL (ref 3.81–5.12)
SODIUM SERPL-SCNC: 140 MMOL/L (ref 135–147)
TRIGL SERPL-MCNC: 203 MG/DL
TSH SERPL DL<=0.05 MIU/L-ACNC: 3.7 UIU/ML (ref 0.45–4.5)
WBC # BLD AUTO: 6.35 THOUSAND/UL (ref 4.31–10.16)

## 2024-02-14 PROCEDURE — 85025 COMPLETE CBC W/AUTO DIFF WBC: CPT

## 2024-02-14 PROCEDURE — 36415 COLL VENOUS BLD VENIPUNCTURE: CPT

## 2024-02-14 PROCEDURE — 84443 ASSAY THYROID STIM HORMONE: CPT

## 2024-02-14 PROCEDURE — 80053 COMPREHEN METABOLIC PANEL: CPT

## 2024-02-14 PROCEDURE — 80061 LIPID PANEL: CPT

## 2024-02-21 PROBLEM — Z12.12 SCREENING FOR COLORECTAL CANCER: Status: RESOLVED | Noted: 2020-09-15 | Resolved: 2024-02-21

## 2024-02-21 PROBLEM — Z12.11 SCREENING FOR COLORECTAL CANCER: Status: RESOLVED | Noted: 2020-09-15 | Resolved: 2024-02-21

## 2024-03-19 DIAGNOSIS — F41.9 ANXIETY: ICD-10-CM

## 2024-03-19 NOTE — TELEPHONE ENCOUNTER
1 [id number] 02/26/2024 01/18/2024 clonazePAM (Tablet) 30.0 30 0.5 MG RENNY SALCIDO [facility] Medicare 1 / 1 [place]    1 [id number] 01/24/2024 01/18/2024 clonazePAM (Tablet) 30.0 30 0.5 MG NA RENNY DAVILA [facility] Medicare 0 / 1 [place]    1 [id number] 12/18/2023 10/20/2023 clonazePAM (Tablet) 30.0 30 0.5 MG NA [facility] Medicare 2 / 2 PA    1 [id number] 11/16/2023 10/20/2023 clonazePAM (Tablet) 30.0 30 0.5 MG NA [facility] Medicare 1 / 2 PA    1 [id number] 10/24/2023 10/20/2023 clonazePAM (Tablet) 30.0 30 0.5 MG [facility] Medicare 0 / 2 PA    1 [id number] 09/06/2023 08/03/2023 clonazePAM (Tablet) 30.0 30 0.5 MG RENNY SALCIDO [facility] Medicare 1 / 1 [place]    1 [id number] 08/04/2023 08/03/2023 clonazePAM (Tablet) 30.0 30 0.5 MG RENNY SALCIDO [facility] Medicare 0 / 1 PA    1 [id number] 07/06/2023 02/03/2023 clonazePAM (Tablet) 30.0 30 0.5 MG RENNY SALCIDO [facility] Medicare 5 / 5 PA    1 [id number] 06/14/2023 02/03/2023 clonazePAM (Tablet) 30.0 30 0.5 MG RENNY SALCIDO [facility] Medicare 4 / 5 [place]    1 [id number] 05/12/2023 02/03/2023 clonazePAM (Tablet) 30.0 30 0.5 MG RENNY SALCIDO [facility] Medicare 3 / 5

## 2024-03-20 RX ORDER — CLONAZEPAM 0.5 MG/1
0.5 TABLET ORAL
Qty: 30 TABLET | Refills: 1 | Status: SHIPPED | OUTPATIENT
Start: 2024-03-20

## 2024-03-31 DIAGNOSIS — E78.00 HYPERCHOLESTEROLEMIA: ICD-10-CM

## 2024-03-31 DIAGNOSIS — M81.0 AGE-RELATED OSTEOPOROSIS WITHOUT CURRENT PATHOLOGICAL FRACTURE: ICD-10-CM

## 2024-03-31 RX ORDER — ATORVASTATIN CALCIUM 20 MG/1
20 TABLET, FILM COATED ORAL DAILY
Qty: 90 TABLET | Refills: 3 | Status: SHIPPED | OUTPATIENT
Start: 2024-03-31

## 2024-04-01 RX ORDER — IBANDRONATE SODIUM 150 MG/1
TABLET, FILM COATED ORAL
Qty: 3 TABLET | Refills: 3 | Status: SHIPPED | OUTPATIENT
Start: 2024-04-01

## 2024-04-04 ENCOUNTER — TELEPHONE (OUTPATIENT)
Dept: ADMINISTRATIVE | Facility: OTHER | Age: 69
End: 2024-04-04

## 2024-04-04 DIAGNOSIS — M70.62 TROCHANTERIC BURSITIS OF LEFT HIP: ICD-10-CM

## 2024-04-04 DIAGNOSIS — M51.16 LUMBAR DISC DISEASE WITH RADICULOPATHY: ICD-10-CM

## 2024-04-04 RX ORDER — CELECOXIB 200 MG/1
200 CAPSULE ORAL DAILY
Qty: 90 CAPSULE | Refills: 3 | Status: SHIPPED | OUTPATIENT
Start: 2024-04-04

## 2024-04-04 NOTE — TELEPHONE ENCOUNTER
04/04/24 11:51 AM    Patient contacted (left message) to bring Advance Directive, POLST, or Living Will document to next scheduled pcp visit.    Thank you.  Jessica Katz  [facility]

## 2024-04-09 ENCOUNTER — OFFICE VISIT (OUTPATIENT)
Dept: FAMILY MEDICINE CLINIC | Facility: CLINIC | Age: 69
End: 2024-04-09

## 2024-04-09 VITALS
WEIGHT: 147.8 LBS | DIASTOLIC BLOOD PRESSURE: 74 MMHG | BODY MASS INDEX: 25.23 KG/M2 | HEART RATE: 69 BPM | SYSTOLIC BLOOD PRESSURE: 112 MMHG | TEMPERATURE: 97.6 F | OXYGEN SATURATION: 98 % | HEIGHT: 64 IN

## 2024-04-09 DIAGNOSIS — G89.4 CHRONIC PAIN SYNDROME: Primary | Chronic | ICD-10-CM

## 2024-04-09 DIAGNOSIS — J34.89 PAIN OF NOSE: ICD-10-CM

## 2024-04-09 DIAGNOSIS — F33.0 MILD EPISODE OF RECURRENT MAJOR DEPRESSIVE DISORDER (HCC): ICD-10-CM

## 2024-04-09 DIAGNOSIS — J01.10 ACUTE NON-RECURRENT FRONTAL SINUSITIS: ICD-10-CM

## 2024-04-09 RX ORDER — BUPROPION HYDROCHLORIDE 150 MG/1
150 TABLET ORAL EVERY MORNING
Qty: 30 TABLET | Refills: 5 | Status: SHIPPED | OUTPATIENT
Start: 2024-04-09 | End: 2024-10-06

## 2024-04-09 RX ORDER — GABAPENTIN 300 MG/1
300 CAPSULE ORAL 2 TIMES DAILY
Qty: 180 CAPSULE | Refills: 1 | Status: SHIPPED | OUTPATIENT
Start: 2024-04-09

## 2024-04-09 RX ORDER — AZITHROMYCIN 250 MG/1
TABLET, FILM COATED ORAL
Qty: 6 TABLET | Refills: 0 | Status: SHIPPED | OUTPATIENT
Start: 2024-04-09 | End: 2024-04-13

## 2024-04-09 NOTE — PROGRESS NOTES
"Name: Diane E Magill      : 1955      MRN: [id number]  Encounter Provider: Dajuan Goodman MD  Encounter Date: 2024   Encounter department: [facility]    Assessment & Plan     1. Chronic pain syndrome  Assessment & Plan:  Unchanged but stable. Continue present care. Refilled gabapentin    Orders:  -     gabapentin (NEURONTIN) 300 mg capsule; Take 1 capsule (300 mg total) by mouth 2 (two) times a day    2. Acute non-recurrent frontal sinusitis  Assessment & Plan:  With nasal irritation?  Start z-pack. Bactroban to nares. Recheck 1w if not improving    Orders:  -     azithromycin (ZITHROMAX) 250 mg tablet; 2 tab today then 1 tab po qd x 4 d    3. Pain of nose  Assessment & Plan:  I reviewed with pt. Start bactroban as directed. Recheck 1-2w if not improving - earlier if worse    Orders:  -     mupirocin (BACTROBAN) 2 % ointment; Apply topically 3 (three) times a day    4. Mild episode of recurrent major depressive disorder (HCC)  Assessment & Plan:  Suboptimal control despite Effexor XR 225mg qd in divided doses. Add Wellbutrin XL 150mg qd. I reviewed side effects  Recheck 4-6w    Orders:  -     buPROPion (WELLBUTRIN XL) 150 mg 24 hr tablet; Take 1 tablet (150 mg total) by mouth every morning         Subjective     f/u multiple med issues   - still struggling with mood and energy. PHQ done  - still struggling with back pain. Worse with standing /walking though she does try to walk a little for exercise.   - pt denies CP, palpitations, lightheadedness or other CV symptoms with or without exertion  Pt notes increased ear congestion over the last several months. No change in hearing or vertigo, but pt does have some tinnitus.   - pt also with \"sores in my nose\"  No increased discharge but it is associated with frontal sinus area pain that radiates back to vertex.   - pt with intermittent stomach upset. No change in Bms.   - due mammogram        Review of Systems "   Constitutional: Negative.    HENT:  Positive for congestion, sinus pressure and sinus pain. Negative for ear discharge, ear pain and sore throat.         Nose sores   Eyes: Negative.    Respiratory: Negative.     Cardiovascular: Negative.    Gastrointestinal: Negative.    Genitourinary: Negative.    Musculoskeletal:  Positive for arthralgias, back pain and myalgias. Negative for joint swelling.   Skin: Negative.    Neurological:  Negative for dizziness, weakness, light-headedness and numbness.   Psychiatric/Behavioral:  Positive for dysphoric mood and sleep disturbance. The patient is nervous/anxious.        Past Medical History:   Diagnosis Date   • Anxiety    • Depression    • Fractures    • GERD (gastroesophageal reflux disease)    • Hyperlipidemia    • Low back pain    • Panic attacks    • Psychiatric disorder     anxiety, depression     Past Surgical History:   Procedure Laterality Date   • BLADDER SUSPENSION  1980    Mesh   • CHOLECYSTECTOMY     • COLONOSCOPY     • FRACTURE SURGERY Left    • GALLBLADDER SURGERY     • CT LAPAROSCOPY SURG CHOLECYSTECTOMY N/A 7/11/2016    Procedure: LAPAROSCOPIC CHOLECYSTECTOMY ;  Surgeon: Henok Knutson DO;  Location: [facility];  Service: General. Last assessed: 5/24/16   • CT OPTX FEM SHFT FX W/INSJ IMED IMPLT W/WO SCREW Left 3/1/2020    Procedure: INSERTION NAIL IM FEMUR ANTEGRADE (TROCHANTERIC);  Surgeon: Segun Alvarez MD;  Location: [facility];  Service: Orthopedics   • TUBAL LIGATION  1976   • URETHRA SURGERY       Family History   Problem Relation Age of Onset   • Other Mother         Methicillin Resistant Staphylococcus Aureus Infection   • Mental illness Mother    • Alzheimer's disease Mother    • Lung cancer Father    • Ovarian cancer Sister    • No Known Problems Sister    • No Known Problems Daughter    • No Known Problems Daughter    • No Known Problems Maternal Grandmother    • No Known Problems Maternal Grandfather    • Stomach cancer Paternal Grandmother     • No Known Problems Paternal Grandfather    • Alzheimer's disease Other    • No Known Problems Maternal Aunt    • No Known Problems Maternal Aunt    • No Known Problems Maternal Aunt    • No Known Problems Paternal Aunt    • No Known Problems Paternal Aunt    • No Known Problems Paternal Aunt    • No Known Problems Paternal Aunt    • No Known Problems Paternal Aunt      Social History     Socioeconomic History   • Marital status:      Spouse name: None   • Number of children: None   • Years of education: 12   • Highest education level: None   Occupational History   • Occupation: Retired   Tobacco Use   • Smoking status: Former     Current packs/day: 0.00     Types: Cigarettes     Quit date: 2004     Years since quittin.0   • Smokeless tobacco: Never   Vaping Use   • Vaping status: Never Used   Substance and Sexual Activity   • Alcohol use: Never     Alcohol/week: 0.0 standard drinks of alcohol   • Drug use: No   • Sexual activity: None   Other Topics Concern   • None   Social History Narrative    Always uses setbelt    Denied History of Dental care: regularly    Does not exercise    Multiple organ donor    No guns in home    No living will    Pets in the home    Denied history of Power of  in existence    Tea    Water intake, adequate (per day)    Denied history of daily cola consumption     Social Determinants of Health     Financial Resource Strain: Low Risk  (2024)    Overall Financial Resource Strain (CARDIA)    • Difficulty of Paying Living Expenses: Not hard at all   Food Insecurity: Not on file   Transportation Needs: No Transportation Needs (2024)    PRAPARE - Transportation    • Lack of Transportation (Medical): No    • Lack of Transportation (Non-Medical): No   Physical Activity: Not on file   Stress: Not on file   Social Connections: Not on file   Intimate Partner Violence: Not on file   Housing Stability: Not on file     Current Outpatient Medications on File Prior to  "Visit   Medication Sig   • atorvastatin (LIPITOR) 20 mg tablet TAKE 1 TABLET EVERY DAY   • Calcium 600 MG tablet Take 600 mg by mouth daily    • celecoxib (CeleBREX) 200 mg capsule TAKE 1 CAPSULE EVERY DAY   • Cholecalciferol (VITAMIN D3) 1000 units CAPS Take 1,000 Units by mouth daily    • clonazePAM (KlonoPIN) 0.5 mg tablet TAKE 1 TABLET AT BEDTIME   • cyanocobalamin 1000 MCG tablet Take 1,000 mcg by mouth daily    • fluticasone (FLONASE) 50 mcg/act nasal spray USE 2 SPRAYS IN EACH NOSTRIL EVERY DAY   • ibandronate (BONIVA) 150 MG tablet TAKE 1 TABLET EVERY 30 DAYS   • lidocaine (LIDODERM) 5 % Apply 1 patch topically daily Remove & Discard patch within 12 hours or as directed by MD   • loratadine (CLARITIN) 10 mg tablet Take 1 tablet (10 mg total) by mouth daily   • Magnesium Oxide -Mg Supplement 400 MG CAPS Take 1 capsule by mouth daily   • melatonin 3 mg Take 3 mg by mouth daily at bedtime   • Multiple Vitamin (MULTI-VITAMIN DAILY) TABS Take by mouth   • Omega-3 Fatty Acids (FISH OIL PO) Take by mouth   • omeprazole (PriLOSEC) 40 MG capsule TAKE 1 CAPSULE EVERY DAY   • venlafaxine (EFFEXOR-XR) 150 mg 24 hr capsule TAKE 1 CAPSULE EVERY MORNING   • venlafaxine (EFFEXOR-XR) 75 mg 24 hr capsule TAKE 1 CAPSULE EVERY EVENING     Allergies   Allergen Reactions   • Aspartame - Food Allergy Headache   • Vancomycin Hives   • Penicillins Hives and Rash     Immunization History   Administered Date(s) Administered   • Pneumococcal Conjugate 13-Valent 08/24/2016   • Pneumococcal Polysaccharide PPV23 03/21/2017   • Tdap 12/13/2011   • Zoster 09/18/2016       Objective     /74 (BP Location: Left arm, Patient Position: Sitting, Cuff Size: Adult)   Pulse 69   Temp 97.6 °F (36.4 °C)   Ht 5' 4.25\" (1.632 m)   Wt 67 kg (147 lb 12.8 oz)   SpO2 98%   BMI 25.17 kg/m²     Physical Exam  Vitals reviewed.   HENT:      Head: Normocephalic.      Right Ear: Tympanic membrane, ear canal and external ear normal.      Left Ear: " Tympanic membrane, ear canal and external ear normal.      Nose: Congestion present.      Comments: Maxillary sinuses TTP. Nose with anterior irritation bilat     Mouth/Throat:      Mouth: Mucous membranes are moist.   Cardiovascular:      Rate and Rhythm: Normal rate and regular rhythm.   Pulmonary:      Effort: Pulmonary effort is normal.      Breath sounds: No wheezing or rales.   Abdominal:      General: There is no distension.      Palpations: There is no mass.      Tenderness: There is no abdominal tenderness.   Musculoskeletal:         General: Tenderness (along thoracic and lumbar paraspinal muscles. No spasm appreciated) and deformity (increased thoracic kyphosis) present.      Cervical back: No tenderness.      Right lower leg: No edema.      Left lower leg: No edema.   Lymphadenopathy:      Cervical: No cervical adenopathy.   Skin:     General: Skin is warm.      Capillary Refill: Capillary refill takes less than 2 seconds.   Neurological:      Mental Status: She is alert.      Sensory: No sensory deficit.      Motor: No weakness.      Gait: Gait abnormal (mildly antalgic appearing gait).   Psychiatric:      Comments: PHQ-2/9 Depression Screening    Little interest or pleasure in doing things: 2 - more than half the days  Feeling down, depressed, or hopeless: 1 - several days  Trouble falling or staying asleep, or sleeping too much: 1 - several days  Feeling tired or having little energy: 2 - more than half the days  Poor appetite or overeatin - not at all  Feeling bad about yourself - or that you are a failure or have let   yourself or your family down: 1 - several days  Trouble concentrating on things, such as reading the newspaper or watching   television: 2 - more than half the days  Moving or speaking so slowly that other people could have noticed. Or the   opposite - being so fidgety or restless that you have been moving around a   lot more than usual: 0 - not at all  Thoughts that you would be  better off dead, or of hurting yourself in some   way: 0 - not at all  PHQ-9 Score: 9  PHQ-9 Interpretation: Mild depression          Dajuan Goodman MD

## 2024-04-13 PROBLEM — J34.89 PAIN OF NOSE: Status: ACTIVE | Noted: 2024-04-13

## 2024-04-13 PROBLEM — J01.10 ACUTE NON-RECURRENT FRONTAL SINUSITIS: Status: ACTIVE | Noted: 2024-04-13

## 2024-04-13 NOTE — ASSESSMENT & PLAN NOTE
Suboptimal control despite Effexor XR 225mg qd in divided doses. Add Wellbutrin XL 150mg qd. I reviewed side effects  Recheck 4-6w

## 2024-04-22 ENCOUNTER — TELEPHONE (OUTPATIENT)
Dept: FAMILY MEDICINE CLINIC | Facility: CLINIC | Age: 69
End: 2024-04-22

## 2024-04-22 NOTE — TELEPHONE ENCOUNTER
Patient called and stated that the Bupropion the doctor put her on doesn't agree with her. She said it made her very shaky and nauseous and she didn't like it.     She would like it if someone from the office could give her a call on what else she can do.     She can be reached at [phone]

## 2024-04-24 NOTE — TELEPHONE ENCOUNTER
Patient stopped taking him on Monday of this week and the symptoms have resolved. Patient questioned if she could take it night instead of in the morning. Or just change the medication?

## 2024-05-08 DIAGNOSIS — F33.0 MILD EPISODE OF RECURRENT MAJOR DEPRESSIVE DISORDER (HCC): ICD-10-CM

## 2024-05-08 RX ORDER — BUPROPION HYDROCHLORIDE 150 MG/1
150 TABLET ORAL EVERY MORNING
Qty: 30 TABLET | Refills: 5 | Status: SHIPPED | OUTPATIENT
Start: 2024-05-08 | End: 2024-11-04

## 2024-05-08 NOTE — TELEPHONE ENCOUNTER
Pt stated she would like the buPROPion (WELLBUTRIN XL) 150 mg 24 hr tablet sent to:  [facility]       As she can't get a ride to [facility]. Also, pt sated for future reference please send all meds to [facility]. As of now pt does not need anymore gabapentin (NEURONTIN) 300 mg capsule. Thank you for your help.

## 2024-05-13 PROBLEM — J01.10 ACUTE NON-RECURRENT FRONTAL SINUSITIS: Status: RESOLVED | Noted: 2024-04-13 | Resolved: 2024-05-13

## 2024-05-15 ENCOUNTER — TELEPHONE (OUTPATIENT)
Dept: ADMINISTRATIVE | Facility: OTHER | Age: 69
End: 2024-05-15

## 2024-05-15 NOTE — TELEPHONE ENCOUNTER
05/15/24 10:21 AM    Patient contacted (left message) to bring Advance Directive, POLST, or Living Will document to next scheduled pcp visit.    Thank you.  Samantha rCuz  [facility]

## 2024-05-20 ENCOUNTER — OFFICE VISIT (OUTPATIENT)
Dept: FAMILY MEDICINE CLINIC | Facility: CLINIC | Age: 69
End: 2024-05-20
Payer: COMMERCIAL

## 2024-05-20 VITALS
BODY MASS INDEX: 25.06 KG/M2 | WEIGHT: 146.8 LBS | TEMPERATURE: 97.6 F | HEART RATE: 70 BPM | SYSTOLIC BLOOD PRESSURE: 120 MMHG | DIASTOLIC BLOOD PRESSURE: 72 MMHG | OXYGEN SATURATION: 98 % | HEIGHT: 64 IN

## 2024-05-20 DIAGNOSIS — F33.0 MILD EPISODE OF RECURRENT MAJOR DEPRESSIVE DISORDER (HCC): ICD-10-CM

## 2024-05-20 DIAGNOSIS — R25.1 TREMOR: Primary | ICD-10-CM

## 2024-05-20 PROCEDURE — 99214 OFFICE O/P EST MOD 30 MIN: CPT | Performed by: FAMILY MEDICINE

## 2024-05-20 PROCEDURE — G2211 COMPLEX E/M VISIT ADD ON: HCPCS | Performed by: FAMILY MEDICINE

## 2024-05-20 NOTE — ASSESSMENT & PLAN NOTE
New onset.  Exam shows tremor in both upper and lower extremities bilaterally though right is much more pronounced than left.  No pronator drift, asymmetric weakness, or asymmetric numbness noted.  Cranial nerves appear to be intact.  Symptoms started 3 to 4 weeks after starting Wellbutrin raising the possibility of side effect.  Given his lack of efficacy in regards to her mood, we will have her stop the Wellbutrin and observe.  Recheck 2 to 3 weeks if not improving-earlier if worse.

## 2024-05-20 NOTE — PROGRESS NOTES
Ambulatory Visit  Name: Diane E Magill      : 1955      MRN: [id number]  Encounter Provider: Dajuan Goodman MD  Encounter Date: 2024   Encounter department: [facility]    Assessment & Plan   1. Tremor  Assessment & Plan:  New onset.  Exam shows tremor in both upper and lower extremities bilaterally though right is much more pronounced than left.  No pronator drift, asymmetric weakness, or asymmetric numbness noted.  Cranial nerves appear to be intact.  Symptoms started 3 to 4 weeks after starting Wellbutrin raising the possibility of side effect.  Given his lack of efficacy in regards to her mood, we will have her stop the Wellbutrin and observe.  Recheck 2 to 3 weeks if not improving-earlier if worse.  2. Mild episode of recurrent major depressive disorder (HCC)  Assessment & Plan:  Unclear if Wellbutrin is causing tremor, but it does not appear to be helping mood.  Will have pt hold Wellbutrin and watch. Recheck 3-4w       History of Present Illness     f/u multiple med issues   - pt notes R arm/legt tremor over the last 2w. Started without preceding illness, HA or other event. Pt states that she starts off in the morning without symptoms, but they slowly worsen as the day goes on.  Tremor occurs both with movement and at rest.  No left-sided symptoms are noted.  She denies right-sided weakness or numbness.  She denies any other neurologic symptoms.  Patient did start Wellbutrin approximately 6 weeks ago, and just recently changed dosing to nighttime.  She has not noticed any change in mood since starting this medication.  She is still taking Effexor 225mg a day as well.  - nose sore has resolved.   - no new CV, resp, GI or  complaints      Review of Systems   Constitutional:  Positive for fatigue. Negative for activity change and appetite change.   HENT:  Positive for congestion. Negative for ear discharge, ear pain, sinus pressure and sinus pain.    Eyes:  Negative.    Respiratory: Negative.     Cardiovascular: Negative.    Gastrointestinal: Negative.    Genitourinary: Negative.    Musculoskeletal:  Positive for arthralgias, back pain and myalgias.   Skin: Negative.    Neurological:  Positive for tremors. Negative for dizziness, weakness, light-headedness, numbness and headaches.   Psychiatric/Behavioral:  Positive for dysphoric mood and sleep disturbance.      Past Medical History:   Diagnosis Date   • Anxiety    • Depression    • Fractures    • GERD (gastroesophageal reflux disease)    • Hyperlipidemia    • Low back pain    • Panic attacks    • Psychiatric disorder     anxiety, depression     Past Surgical History:   Procedure Laterality Date   • BLADDER SUSPENSION  1980    Mesh   • CHOLECYSTECTOMY     • COLONOSCOPY     • FRACTURE SURGERY Left    • GALLBLADDER SURGERY     • KS LAPAROSCOPY SURG CHOLECYSTECTOMY N/A 7/11/2016    Procedure: LAPAROSCOPIC CHOLECYSTECTOMY ;  Surgeon: Henok Knutson DO;  [facility];  Service: General. Last assessed: 5/24/16   • [place] OPTX FEM SHFT FX W/INSJ IMED IMPLT W/WO SCREW Left 3/1/2020    Procedure: INSERTION NAIL IM FEMUR ANTEGRADE (TROCHANTERIC);  Surgeon: Segun Alvarez MD;  [facility];  Service: Orthopedics   • TUBAL LIGATION  1976   • URETHRA SURGERY       Family History   Problem Relation Age of Onset   • Other Mother         Methicillin Resistant Staphylococcus Aureus Infection   • Mental illness Mother    • Alzheimer's disease Mother    • Lung cancer Father    • Ovarian cancer Sister    • No Known Problems Sister    • No Known Problems Daughter    • No Known Problems Daughter    • No Known Problems Maternal Grandmother    • No Known Problems Maternal Grandfather    • Stomach cancer Paternal Grandmother    • No Known Problems Paternal Grandfather    • Alzheimer's disease Other    • No Known Problems Maternal Aunt    • No Known Problems Maternal Aunt    • No Known Problems Maternal Aunt    • No Known Problems  Paternal Aunt    • No Known Problems Paternal Aunt    • No Known Problems Paternal Aunt    • No Known Problems Paternal Aunt    • No Known Problems Paternal Aunt      Social History     Tobacco Use   • Smoking status: Former     Current packs/day: 0.00     Types: Cigarettes     Quit date: 2004     Years since quittin.1   • Smokeless tobacco: Never   Vaping Use   • Vaping status: Never Used   Substance and Sexual Activity   • Alcohol use: Never     Alcohol/week: 0.0 standard drinks of alcohol   • Drug use: No   • Sexual activity: Not on file     Current Outpatient Medications on File Prior to Visit   Medication Sig   • atorvastatin (LIPITOR) 20 mg tablet TAKE 1 TABLET EVERY DAY   • buPROPion (WELLBUTRIN XL) 150 mg 24 hr tablet Take 1 tablet (150 mg total) by mouth every morning   • Calcium 600 MG tablet Take 600 mg by mouth daily    • celecoxib (CeleBREX) 200 mg capsule TAKE 1 CAPSULE EVERY DAY   • Cholecalciferol (VITAMIN D3) 1000 units CAPS Take 1,000 Units by mouth daily    • cyanocobalamin 1000 MCG tablet Take 1,000 mcg by mouth daily    • fluticasone (FLONASE) 50 mcg/act nasal spray USE 2 SPRAYS IN EACH NOSTRIL EVERY DAY   • gabapentin (NEURONTIN) 300 mg capsule Take 1 capsule (300 mg total) by mouth 2 (two) times a day   • ibandronate (BONIVA) 150 MG tablet TAKE 1 TABLET EVERY 30 DAYS   • lidocaine (LIDODERM) 5 % Apply 1 patch topically daily Remove & Discard patch within 12 hours or as directed by MD   • loratadine (CLARITIN) 10 mg tablet Take 1 tablet (10 mg total) by mouth daily   • Magnesium Oxide -Mg Supplement 400 MG CAPS Take 1 capsule by mouth daily   • melatonin 3 mg Take 3 mg by mouth daily at bedtime   • Multiple Vitamin (MULTI-VITAMIN DAILY) TABS Take by mouth   • mupirocin (BACTROBAN) 2 % ointment Apply topically 3 (three) times a day   • Omega-3 Fatty Acids (FISH OIL PO) Take by mouth   • omeprazole (PriLOSEC) 40 MG capsule TAKE 1 CAPSULE EVERY DAY   • venlafaxine (EFFEXOR-XR) 150 mg 24  "hr capsule TAKE 1 CAPSULE EVERY MORNING   • venlafaxine (EFFEXOR-XR) 75 mg 24 hr capsule TAKE 1 CAPSULE EVERY EVENING     Allergies   Allergen Reactions   • Aspartame - Food Allergy Headache   • Vancomycin Hives   • Penicillins Hives and Rash     Immunization History   Administered Date(s) Administered   • Pneumococcal Conjugate 13-Valent 08/24/2016   • Pneumococcal Polysaccharide PPV23 03/21/2017   • Tdap 12/13/2011   • Zoster 09/18/2016     Objective     /72 (BP Location: Right arm, Patient Position: Sitting, Cuff Size: Adult)   Pulse 70   Temp 97.6 °F (36.4 °C)   Ht 5' 4.25\" (1.632 m)   Wt 66.6 kg (146 lb 12.8 oz)   SpO2 98%   BMI 25.00 kg/m²     Physical Exam  Vitals reviewed.   HENT:      Head: Normocephalic.      Right Ear: Tympanic membrane, ear canal and external ear normal.      Left Ear: Tympanic membrane, ear canal and external ear normal.      Nose: Nose normal.      Mouth/Throat:      Mouth: Mucous membranes are moist.   Eyes:      General: No scleral icterus.     Extraocular Movements: Extraocular movements intact.      Conjunctiva/sclera: Conjunctivae normal.      Pupils: Pupils are equal, round, and reactive to light.   Neck:      Vascular: No carotid bruit.   Cardiovascular:      Rate and Rhythm: Normal rate and regular rhythm.      Pulses: Normal pulses.   Pulmonary:      Effort: Pulmonary effort is normal.      Breath sounds: No wheezing or rales.   Abdominal:      General: There is no distension.      Palpations: There is no mass.      Tenderness: There is no abdominal tenderness.   Musculoskeletal:         General: Tenderness (shoulders and low back - unchanged) and deformity (Increased thoracic spine kyphosis) present.      Cervical back: No tenderness.      Right lower leg: No edema.      Left lower leg: No edema.   Lymphadenopathy:      Cervical: No cervical adenopathy.   Skin:     General: Skin is warm.      Capillary Refill: Capillary refill takes less than 2 seconds. "   Neurological:      General: No focal deficit present.      Mental Status: She is alert and oriented to person, place, and time.      Cranial Nerves: No cranial nerve deficit.      Sensory: No sensory deficit.      Motor: No weakness.      Coordination: Coordination abnormal ((+) tremor in upper and lower extremities).      Gait: Gait normal.   Psychiatric:         Behavior: Behavior normal.         Thought Content: Thought content normal.         Judgment: Judgment normal.      Comments: Depression unchanged         Administrative Statements

## 2024-05-21 DIAGNOSIS — F41.9 ANXIETY: ICD-10-CM

## 2024-05-22 RX ORDER — CLONAZEPAM 0.5 MG/1
0.5 TABLET ORAL
Qty: 30 TABLET | Refills: 1 | Status: SHIPPED | OUTPATIENT
Start: 2024-05-22

## 2024-05-23 NOTE — ASSESSMENT & PLAN NOTE
Unclear if Wellbutrin is causing tremor, but it does not appear to be helping mood.  Will have pt hold Wellbutrin and watch. Recheck 3-4w

## 2024-06-12 ENCOUNTER — TELEPHONE (OUTPATIENT)
Dept: ADMINISTRATIVE | Facility: OTHER | Age: 69
End: 2024-06-12

## 2024-06-12 NOTE — TELEPHONE ENCOUNTER
06/12/24 11:09 AM    Patient contacted to bring Advance Directive, POLST, or Living Will document to next scheduled pcp visit.[facility] Department was unable to leave a message; no answer/ line busy.    Thank you.  Cata Shah  [facility]

## 2024-06-13 DIAGNOSIS — J30.9 ALLERGIC RHINITIS, UNSPECIFIED SEASONALITY, UNSPECIFIED TRIGGER: ICD-10-CM

## 2024-06-13 DIAGNOSIS — K21.9 GASTROESOPHAGEAL REFLUX DISEASE: ICD-10-CM

## 2024-06-13 DIAGNOSIS — F32.A DEPRESSION, UNSPECIFIED DEPRESSION TYPE: ICD-10-CM

## 2024-06-13 RX ORDER — VENLAFAXINE HYDROCHLORIDE 150 MG/1
150 CAPSULE, EXTENDED RELEASE ORAL EVERY MORNING
Qty: 90 CAPSULE | Refills: 1 | Status: SHIPPED | OUTPATIENT
Start: 2024-06-13

## 2024-06-13 RX ORDER — VENLAFAXINE HYDROCHLORIDE 75 MG/1
75 CAPSULE, EXTENDED RELEASE ORAL EVERY EVENING
Qty: 90 CAPSULE | Refills: 1 | Status: SHIPPED | OUTPATIENT
Start: 2024-06-13

## 2024-06-13 RX ORDER — FLUTICASONE PROPIONATE 50 MCG
2 SPRAY, SUSPENSION (ML) NASAL DAILY
Qty: 48 G | Refills: 1 | Status: SHIPPED | OUTPATIENT
Start: 2024-06-13

## 2024-06-13 RX ORDER — OMEPRAZOLE 40 MG/1
40 CAPSULE, DELAYED RELEASE ORAL DAILY
Qty: 90 CAPSULE | Refills: 1 | Status: SHIPPED | OUTPATIENT
Start: 2024-06-13

## 2024-06-18 ENCOUNTER — OFFICE VISIT (OUTPATIENT)
Dept: FAMILY MEDICINE CLINIC | Facility: CLINIC | Age: 69
End: 2024-06-18
Payer: COMMERCIAL

## 2024-06-18 VITALS
BODY MASS INDEX: 25.1 KG/M2 | DIASTOLIC BLOOD PRESSURE: 80 MMHG | HEART RATE: 78 BPM | OXYGEN SATURATION: 99 % | TEMPERATURE: 97.3 F | SYSTOLIC BLOOD PRESSURE: 120 MMHG | WEIGHT: 147 LBS | HEIGHT: 64 IN

## 2024-06-18 DIAGNOSIS — M25.511 CHRONIC PAIN OF BOTH SHOULDERS: ICD-10-CM

## 2024-06-18 DIAGNOSIS — F33.1 MODERATE EPISODE OF RECURRENT MAJOR DEPRESSIVE DISORDER (HCC): Primary | Chronic | ICD-10-CM

## 2024-06-18 DIAGNOSIS — M25.512 CHRONIC PAIN OF BOTH SHOULDERS: ICD-10-CM

## 2024-06-18 DIAGNOSIS — M75.21 BICEPS TENDINITIS OF BOTH SHOULDERS: ICD-10-CM

## 2024-06-18 DIAGNOSIS — G89.29 CHRONIC PAIN OF BOTH SHOULDERS: ICD-10-CM

## 2024-06-18 DIAGNOSIS — M70.61 TROCHANTERIC BURSITIS OF BOTH HIPS: ICD-10-CM

## 2024-06-18 DIAGNOSIS — M75.22 BICEPS TENDINITIS OF BOTH SHOULDERS: ICD-10-CM

## 2024-06-18 DIAGNOSIS — M70.62 TROCHANTERIC BURSITIS OF BOTH HIPS: ICD-10-CM

## 2024-06-18 PROCEDURE — G2211 COMPLEX E/M VISIT ADD ON: HCPCS | Performed by: FAMILY MEDICINE

## 2024-06-18 PROCEDURE — 99214 OFFICE O/P EST MOD 30 MIN: CPT | Performed by: FAMILY MEDICINE

## 2024-06-18 RX ORDER — PREDNISONE 20 MG/1
TABLET ORAL
Qty: 18 TABLET | Refills: 0 | Status: SHIPPED | OUTPATIENT
Start: 2024-06-18

## 2024-06-18 NOTE — PROGRESS NOTES
Ambulatory Visit  Name: Diane E Magill      : 1955      MRN: [id number]  Encounter Provider: Dajuan Goodman MD  Encounter Date: 2024   Encounter department: [facility]    Assessment & Plan   1. Moderate episode of recurrent major depressive disorder (HCC)  Assessment & Plan:  Depression Screening Follow-up Plan: Patient's depression screening was positive with a PHQ-2 score of . Their PHQ-9 score was 14. Pt with side effects on Wellbutrin.  Will refer pt to Psychiatry  for one time eval and recommendations re: change in treatment.  Recheck 2m   Orders:  -     Ambulatory referral to Psych Services; Future  2. Trochanteric bursitis of both hips  Assessment & Plan:  I reviewed with pt. She cannot afford to go to PT.  Start pred taper. Discussed home exercises.  Recheck 2w if not improving  Orders:  -     predniSONE 20 mg tablet; 3 tab po qd x 3d then 2 tab po qd x 3d then 1 tab po qd x 3d then stop  3. Chronic pain of both shoulders  Assessment & Plan:  I reviewed with pt. She cannot afford to go to PT.  Start pred taper. Discussed home exercises.  Recheck 2w if not improving  Orders:  -     predniSONE 20 mg tablet; 3 tab po qd x 3d then 2 tab po qd x 3d then 1 tab po qd x 3d then stop  4. Biceps tendinitis of both shoulders  Assessment & Plan:  I reviewed with pt. She cannot afford to go to PT.  Start pred taper. Discussed home exercises.  Recheck 2w if not improving  Orders:  -     predniSONE 20 mg tablet; 3 tab po qd x 3d then 2 tab po qd x 3d then 1 tab po qd x 3d then stop       History of Present Illness     Pt here for f/u of several issues  - Tremors resolved since stopping Wellbutrin.  However, patient is still suffering from significant depression.  She is compliant with venlafaxine.  She is interested in reviewing other treatment options at this point.  - pt also notes discomfort in her lateral hips as well as her upper back and shoulders with ambulation.  " Patient uses a cane which she states \"I hold very hard\" while ambulating.  Any longer walks resulting in pain and the feeling of spasm in these muscles.  Patient denies any falls she denies any traumas.  - pt tries to ambulate as often as possible.  Denies CP, palpitations, lightheadedness or other CV symptoms with or without exertion  - no new GI or  complaints      Review of Systems   Constitutional:  Positive for activity change, appetite change and fatigue. Negative for chills and fever.   HENT: Negative.     Eyes: Negative.    Respiratory: Negative.     Cardiovascular: Negative.    Gastrointestinal: Negative.    Genitourinary: Negative.    Musculoskeletal:  Positive for arthralgias, back pain, gait problem and myalgias.   Skin: Negative.    Neurological:  Negative for dizziness, weakness, light-headedness, numbness and headaches.   Psychiatric/Behavioral:  Positive for dysphoric mood and sleep disturbance. The patient is nervous/anxious.      Past Medical History:   Diagnosis Date   • Anxiety    • Depression    • Fractures    • GERD (gastroesophageal reflux disease)    • Hyperlipidemia    • Low back pain    • Panic attacks    • Psychiatric disorder     anxiety, depression     Past Surgical History:   Procedure Laterality Date   • BLADDER SUSPENSION  1980    Mesh   • CHOLECYSTECTOMY     • COLONOSCOPY     • FRACTURE SURGERY Left    • GALLBLADDER SURGERY     • [place] LAPAROSCOPY SURG CHOLECYSTECTOMY N/A 7/11/2016    Procedure: LAPAROSCOPIC CHOLECYSTECTOMY ;  Surgeon: Henok Knuston DO;  [facility];  Service: General. Last assessed: 5/24/16   • NM OPTX FEM SHFT FX W/INSJ IMED IMPLT W/WO SCREW Left 3/1/2020    Procedure: INSERTION NAIL IM FEMUR ANTEGRADE (TROCHANTERIC);  Surgeon: Segun Alvarez MD;  [facility];  Service: Orthopedics   • TUBAL LIGATION  1976   • URETHRA SURGERY       Family History   Problem Relation Age of Onset   • Other Mother         Methicillin Resistant Staphylococcus " Aureus Infection   • Mental illness Mother    • Alzheimer's disease Mother    • Lung cancer Father    • Ovarian cancer Sister    • No Known Problems Sister    • No Known Problems Daughter    • No Known Problems Daughter    • No Known Problems Maternal Grandmother    • No Known Problems Maternal Grandfather    • Stomach cancer Paternal Grandmother    • No Known Problems Paternal Grandfather    • Alzheimer's disease Other    • No Known Problems Maternal Aunt    • No Known Problems Maternal Aunt    • No Known Problems Maternal Aunt    • No Known Problems Paternal Aunt    • No Known Problems Paternal Aunt    • No Known Problems Paternal Aunt    • No Known Problems Paternal Aunt    • No Known Problems Paternal Aunt      Social History     Tobacco Use   • Smoking status: Former     Current packs/day: 0.00     Types: Cigarettes     Quit date: 2004     Years since quittin.2   • Smokeless tobacco: Never   Vaping Use   • Vaping status: Never Used   Substance and Sexual Activity   • Alcohol use: Never     Alcohol/week: 0.0 standard drinks of alcohol   • Drug use: No   • Sexual activity: Not on file     Current Outpatient Medications on File Prior to Visit   Medication Sig   • atorvastatin (LIPITOR) 20 mg tablet TAKE 1 TABLET EVERY DAY   • Calcium 600 MG tablet Take 600 mg by mouth daily    • celecoxib (CeleBREX) 200 mg capsule TAKE 1 CAPSULE EVERY DAY   • Cholecalciferol (VITAMIN D3) 1000 units CAPS Take 1,000 Units by mouth daily    • clonazePAM (KlonoPIN) 0.5 mg tablet TAKE 1 TABLET AT BEDTIME   • cyanocobalamin 1000 MCG tablet Take 1,000 mcg by mouth daily    • fluticasone (FLONASE) 50 mcg/act nasal spray USE 2 SPRAYS IN EACH NOSTRIL EVERY DAY   • gabapentin (NEURONTIN) 300 mg capsule Take 1 capsule (300 mg total) by mouth 2 (two) times a day   • ibandronate (BONIVA) 150 MG tablet TAKE 1 TABLET EVERY 30 DAYS   • lidocaine (LIDODERM) 5 % Apply 1 patch topically daily Remove & Discard patch within 12 hours or as  "directed by MD   • loratadine (CLARITIN) 10 mg tablet Take 1 tablet (10 mg total) by mouth daily   • Magnesium Oxide -Mg Supplement 400 MG CAPS Take 1 capsule by mouth daily   • melatonin 3 mg Take 3 mg by mouth daily at bedtime   • Multiple Vitamin (MULTI-VITAMIN DAILY) TABS Take by mouth   • mupirocin (BACTROBAN) 2 % ointment Apply topically 3 (three) times a day   • Omega-3 Fatty Acids (FISH OIL PO) Take by mouth   • omeprazole (PriLOSEC) 40 MG capsule TAKE 1 CAPSULE EVERY DAY   • venlafaxine (EFFEXOR-XR) 150 mg 24 hr capsule TAKE 1 CAPSULE EVERY MORNING   • venlafaxine (EFFEXOR-XR) 75 mg 24 hr capsule TAKE 1 CAPSULE EVERY EVENING   • buPROPion (WELLBUTRIN XL) 150 mg 24 hr tablet Take 1 tablet (150 mg total) by mouth every morning (Patient not taking: Reported on 6/18/2024)     Allergies   Allergen Reactions   • Aspartame - Food Allergy Headache   • Vancomycin Hives   • Penicillins Hives and Rash     Immunization History   Administered Date(s) Administered   • Pneumococcal Conjugate 13-Valent 08/24/2016   • Pneumococcal Polysaccharide PPV23 03/21/2017   • Tdap 12/13/2011   • Zoster 09/18/2016     Objective     /80 (BP Location: Left arm, Patient Position: Sitting, Cuff Size: Adult)   Pulse 78   Temp (!) 97.3 °F (36.3 °C)   Ht 5' 4.25\" (1.632 m)   Wt 66.7 kg (147 lb)   SpO2 99%   BMI 25.04 kg/m²     Physical Exam  Vitals reviewed.   HENT:      Head: Normocephalic.      Mouth/Throat:      Mouth: Mucous membranes are moist.   Eyes:      Extraocular Movements: Extraocular movements intact.      Conjunctiva/sclera: Conjunctivae normal.      Pupils: Pupils are equal, round, and reactive to light.   Cardiovascular:      Rate and Rhythm: Normal rate and regular rhythm.   Pulmonary:      Effort: Pulmonary effort is normal.      Breath sounds: No wheezing or rales.   Abdominal:      General: There is no distension.      Palpations: There is no mass.      Tenderness: There is no abdominal tenderness. "   Musculoskeletal:         General: Tenderness (TTP over the greater trochanteric bursa bilat.  Discomfort with abduction and anterior flexion bilat.  (+) discomfort withempty can test and with flexing bicep bilat) and deformity (mild diffuse OA changes) present.      Cervical back: No tenderness.      Right lower leg: No edema.      Left lower leg: No edema.   Lymphadenopathy:      Cervical: No cervical adenopathy.   Skin:     General: Skin is warm.      Capillary Refill: Capillary refill takes less than 2 seconds.   Neurological:      General: No focal deficit present.      Mental Status: She is alert and oriented to person, place, and time.   Psychiatric:         Behavior: Behavior normal.         Thought Content: Thought content normal.         Judgment: Judgment normal.      Comments: PHQ-2/9 Depression Screening    Little interest or pleasure in doing things: 2 - more than half the days  Feeling down, depressed, or hopeless: 2 - more than half the days  Trouble falling or staying asleep, or sleeping too much: 0 - not at all  Feeling tired or having little energy: 3 - nearly every day  Poor appetite or overeatin - several days  Feeling bad about yourself - or that you are a failure or have let   yourself or your family down: 3 - nearly every day  Trouble concentrating on things, such as reading the newspaper or watching   television: 3 - nearly every day  Moving or speaking so slowly that other people could have noticed. Or the   opposite - being so fidgety or restless that you have been moving around a   lot more than usual: 0 - not at all  Thoughts that you would be better off dead, or of hurting yourself in some   way: 0 - not at all  PHQ-9 Score: 14  PHQ-9 Interpretation: Moderate depression            Administrative Statements

## 2024-06-19 ENCOUNTER — TELEPHONE (OUTPATIENT)
Dept: PSYCHIATRY | Facility: CLINIC | Age: 69
End: 2024-06-19

## 2024-06-21 PROBLEM — F33.1 MODERATE EPISODE OF RECURRENT MAJOR DEPRESSIVE DISORDER (HCC): Status: ACTIVE | Noted: 2017-10-31

## 2024-06-21 PROBLEM — M75.22 BICEPS TENDINITIS OF BOTH SHOULDERS: Status: ACTIVE | Noted: 2024-06-21

## 2024-06-21 PROBLEM — M25.512 CHRONIC PAIN OF BOTH SHOULDERS: Status: ACTIVE | Noted: 2024-01-11

## 2024-06-21 PROBLEM — M70.62 TROCHANTERIC BURSITIS OF BOTH HIPS: Status: ACTIVE | Noted: 2024-06-21

## 2024-06-21 PROBLEM — M75.21 BICEPS TENDINITIS OF BOTH SHOULDERS: Status: ACTIVE | Noted: 2024-06-21

## 2024-06-21 PROBLEM — M70.61 TROCHANTERIC BURSITIS OF BOTH HIPS: Status: ACTIVE | Noted: 2024-06-21

## 2024-06-21 NOTE — ASSESSMENT & PLAN NOTE
I reviewed with pt. She cannot afford to go to PT.  Start pred taper. Discussed home exercises.  Recheck 2w if not improving

## 2024-06-21 NOTE — ASSESSMENT & PLAN NOTE
Depression Screening Follow-up Plan: Patient's depression screening was positive with a PHQ-2 score of . Their PHQ-9 score was 14. Pt with side effects on Wellbutrin.  Will refer pt to Psychiatry  for one time eval and recommendations re: change in treatment.  Recheck 2m

## 2024-08-01 DIAGNOSIS — M70.62 TROCHANTERIC BURSITIS OF LEFT HIP: ICD-10-CM

## 2024-08-01 DIAGNOSIS — M51.16 LUMBAR DISC DISEASE WITH RADICULOPATHY: ICD-10-CM

## 2024-08-01 DIAGNOSIS — F41.9 ANXIETY: ICD-10-CM

## 2024-08-01 NOTE — TELEPHONE ENCOUNTER
Reason for call:   [x] Refill   [] Prior Auth  [x] Other: Patient states she has been without her medication for a week    Office:   [x] PCP/Provider - [facility] SIN Goodman MD   [] Specialty/Provider -     Medication: celecoxib (CeleBREX) 200 mg capsule    Dose/Frequency: TAKE 1 CAPSULE EVERY DAY     Quantity: 90 capsule     [place]: [address] [place] - [address] [place] [phone]    Does the patient have enough for 3 days?   [] Yes   [x] No - Send as HP to POD

## 2024-08-02 RX ORDER — CELECOXIB 200 MG/1
200 CAPSULE ORAL DAILY
Qty: 100 CAPSULE | Refills: 1 | Status: SHIPPED | OUTPATIENT
Start: 2024-08-02

## 2024-08-02 RX ORDER — CLONAZEPAM 0.5 MG/1
0.5 TABLET ORAL
Qty: 30 TABLET | Refills: 1 | Status: SHIPPED | OUTPATIENT
Start: 2024-08-02

## 2024-08-02 NOTE — TELEPHONE ENCOUNTER
1 [id number] 06/19/2024 05/22/2024 clonazePAM (Tablet) 30.0 30 0.5 MG RENNY SALCIDO [facility] [place] 1 / 1 [place]    1 [id number] 05/24/2024 05/22/2024 clonazePAM (Tablet) 30.0 30 0.5 MG RENNY SALCIDO [facility] [place] 0 / 1 [place]    1 [id number] 04/25/2024 03/20/2024 clonazePAM (Tablet) 30.0 30 0.5 MG RENNY SALCIDO [facility] [place] 1 / 1 [place]    1 [id number] 03/22/2024 03/20/2024 clonazePAM (Tablet) 30.0 30 0.5 MG RENNY SALCIDO [facility] [place] 0 / 1 [place]    1 [id number] 02/26/2024 01/18/2024 clonazePAM (Tablet) 30.0 30 0.5 MG RENNY SALCIDO [facility] [place] 1 / 1 [place]    1 [id number] 01/24/2024 01/18/2024 clonazePAM (Tablet) 30.0 30 0.5 MG RENNY SALCIDO [facility] [place] 0 / 1 [place]    1 [id number] 12/18/2023 10/20/2023 clonazePAM (Tablet) 30.0 30 0.5 MG NA [facility] [place] 2 / 2 [place]    1 [id number] 11/16/2023 10/20/2023 clonazePAM (Tablet) 30.0 30 0.5 MG NA [facility] [place] 1 / 2 [place]    1 [id number] 10/24/2023 10/20/2023 clonazePAM (Tablet) 30.0 30 0.5 MG NA [facility] [place] 0 / 2 [place]    1 [id number] 09/06/2023 08/03/2023 clonazePAM (Tablet) 30.0 30 0.5 MG RENNY SALCIDO [facility] Medicare 1 / 1 [place]

## 2024-08-12 ENCOUNTER — RA CDI HCC (OUTPATIENT)
Dept: OTHER | Facility: HOSPITAL | Age: 69
End: 2024-08-12

## 2024-08-12 PROBLEM — U07.1 COVID: Status: RESOLVED | Noted: 2022-05-24 | Resolved: 2024-08-12

## 2024-08-14 ENCOUNTER — VBI (OUTPATIENT)
Dept: ADMINISTRATIVE | Facility: OTHER | Age: 69
End: 2024-08-14

## 2024-08-14 NOTE — TELEPHONE ENCOUNTER
08/14/24 9:22 AM    Patient contacted to bring Advance Directive, POLST, or Living Will document to next scheduled pcp visit.[facility] Department left message.    Thank you.  Shi Gregory  [facility]

## 2024-08-19 ENCOUNTER — OFFICE VISIT (OUTPATIENT)
Dept: FAMILY MEDICINE CLINIC | Facility: CLINIC | Age: 69
End: 2024-08-19
Payer: COMMERCIAL

## 2024-08-19 VITALS
SYSTOLIC BLOOD PRESSURE: 110 MMHG | TEMPERATURE: 97.4 F | BODY MASS INDEX: 25.16 KG/M2 | OXYGEN SATURATION: 96 % | HEIGHT: 64 IN | DIASTOLIC BLOOD PRESSURE: 70 MMHG | HEART RATE: 82 BPM | WEIGHT: 147.4 LBS

## 2024-08-19 DIAGNOSIS — M25.512 CHRONIC PAIN OF BOTH SHOULDERS: ICD-10-CM

## 2024-08-19 DIAGNOSIS — R10.13 EPIGASTRIC PAIN: Primary | ICD-10-CM

## 2024-08-19 DIAGNOSIS — G89.29 CHRONIC PAIN OF BOTH SHOULDERS: ICD-10-CM

## 2024-08-19 DIAGNOSIS — M25.511 CHRONIC PAIN OF BOTH SHOULDERS: ICD-10-CM

## 2024-08-19 DIAGNOSIS — F33.1 MODERATE EPISODE OF RECURRENT MAJOR DEPRESSIVE DISORDER (HCC): ICD-10-CM

## 2024-08-19 PROCEDURE — 99214 OFFICE O/P EST MOD 30 MIN: CPT | Performed by: FAMILY MEDICINE

## 2024-08-19 PROCEDURE — G2211 COMPLEX E/M VISIT ADD ON: HCPCS | Performed by: FAMILY MEDICINE

## 2024-08-19 NOTE — PROGRESS NOTES
Ambulatory Visit  Name: Diane E Magill      : 1955      MRN: [id number]  Encounter Provider: Dajuan Goodman MD  Encounter Date: 2024   Encounter department: [facility]    Assessment & Plan   1. Epigastric pain  Assessment & Plan:  ?cause. No masses on exam. Check labs including lipase. Increase omeprazole to 40mg bid x 6-7d. Pt to call in 1w with f/u - consider GI eval and imaging if not improving in 1w  Orders:  -     Comprehensive metabolic panel; Future; Expected date: 2024  -     CBC and differential; Future  -     Lipase; Future  2. Chronic pain of both shoulders  Assessment & Plan:  Suspect RC cause. Offered injection - pt refused for now. Also offered PT, but pt states that she cannot afford it. Will continue home exercises, and Celebrex.  Recheck 3m - earlier if worse  3. Moderate episode of recurrent major depressive disorder (HCC)  Assessment & Plan:  I reviewed with pt. Pain seems to be a primary trigger for worsening mood. Continue present care. Monitor. Recheck 3m       History of Present Illness     f/u multiple med issues  - pt still has bilat upper arm pains.  Prednisone helped - pain relief lasted 6w, but now pain is back. Pt did note that her appetite increased on the pred  - pt continues to try to walk for exercise. Pt denies CP, palpitations, lightheadedness or other CV symptoms with or without exertion  - pt with postprandial epigastric pain over the last 2 weeks. Compliant with omeprazole. No change with cutting back on coffee.       Review of Systems   Constitutional: Negative.    Eyes: Negative.    Respiratory: Negative.     Cardiovascular: Negative.    Gastrointestinal:  Positive for abdominal pain. Negative for abdominal distention, constipation, diarrhea, nausea and vomiting.   Genitourinary: Negative.    Musculoskeletal:  Positive for arthralgias, back pain and myalgias.   Skin: Negative.    Neurological:  Negative for dizziness,  weakness, light-headedness, numbness and headaches.   Psychiatric/Behavioral:  Positive for dysphoric mood (mood remains labile).      Past Medical History:   Diagnosis Date   • Anxiety    • Depression    • Fractures    • GERD (gastroesophageal reflux disease)    • Hyperlipidemia    • Low back pain    • Panic attacks    • Psychiatric disorder     anxiety, depression     Past Surgical History:   Procedure Laterality Date   • BLADDER SUSPENSION  1980    Mesh   • CHOLECYSTECTOMY     • COLONOSCOPY     • FRACTURE SURGERY Left    • GALLBLADDER SURGERY     • OR LAPAROSCOPY SURG CHOLECYSTECTOMY N/A 7/11/2016    Procedure: LAPAROSCOPIC CHOLECYSTECTOMY ;  Surgeon: Henok Knutson DO;  Location: [facility];  Service: General. Last assessed: 5/24/16   • OR OPTX FEM SHFT FX W/INSJ IMED IMPLT W/WO SCREW Left 3/1/2020    Procedure: INSERTION NAIL IM FEMUR ANTEGRADE (TROCHANTERIC);  Surgeon: Segun Alvarez MD;  Location: [facility];  Service: Orthopedics   • TUBAL LIGATION  1976   • URETHRA SURGERY       Family History   Problem Relation Age of Onset   • Other Mother         Methicillin Resistant Staphylococcus Aureus Infection   • Mental illness Mother    • Alzheimer's disease Mother    • Lung cancer Father    • Ovarian cancer Sister    • No Known Problems Sister    • No Known Problems Daughter    • No Known Problems Daughter    • No Known Problems Maternal Grandmother    • No Known Problems Maternal Grandfather    • Stomach cancer Paternal Grandmother    • No Known Problems Paternal Grandfather    • Alzheimer's disease Other    • No Known Problems Maternal Aunt    • No Known Problems Maternal Aunt    • No Known Problems Maternal Aunt    • No Known Problems Paternal Aunt    • No Known Problems Paternal Aunt    • No Known Problems Paternal Aunt    • No Known Problems Paternal Aunt    • No Known Problems Paternal Aunt      Social History     Tobacco Use   • Smoking status: Former     Current packs/day: 0.00     Types: Cigarettes      Quit date: 2004     Years since quittin.3   • Smokeless tobacco: Never   Vaping Use   • Vaping status: Never Used   Substance and Sexual Activity   • Alcohol use: Never     Alcohol/week: 0.0 standard drinks of alcohol   • Drug use: No   • Sexual activity: Not on file     Current Outpatient Medications on File Prior to Visit   Medication Sig   • atorvastatin (LIPITOR) 20 mg tablet TAKE 1 TABLET EVERY DAY   • Calcium 600 MG tablet Take 600 mg by mouth daily    • celecoxib (CeleBREX) 200 mg capsule Take 1 capsule (200 mg total) by mouth daily   • Cholecalciferol (VITAMIN D3) 1000 units CAPS Take 1,000 Units by mouth daily    • clonazePAM (KlonoPIN) 0.5 mg tablet TAKE 1 TABLET AT BEDTIME   • cyanocobalamin 1000 MCG tablet Take 1,000 mcg by mouth daily    • fluticasone (FLONASE) 50 mcg/act nasal spray USE 2 SPRAYS IN EACH NOSTRIL EVERY DAY   • gabapentin (NEURONTIN) 300 mg capsule Take 1 capsule (300 mg total) by mouth 2 (two) times a day   • ibandronate (BONIVA) 150 MG tablet TAKE 1 TABLET EVERY 30 DAYS   • lidocaine (LIDODERM) 5 % Apply 1 patch topically daily Remove & Discard patch within 12 hours or as directed by MD   • loratadine (CLARITIN) 10 mg tablet Take 1 tablet (10 mg total) by mouth daily   • Magnesium Oxide -Mg Supplement 400 MG CAPS Take 1 capsule by mouth daily   • melatonin 3 mg Take 3 mg by mouth daily at bedtime   • Multiple Vitamin (MULTI-VITAMIN DAILY) TABS Take by mouth   • Omega-3 Fatty Acids (FISH OIL PO) Take by mouth   • omeprazole (PriLOSEC) 40 MG capsule TAKE 1 CAPSULE EVERY DAY   • venlafaxine (EFFEXOR-XR) 150 mg 24 hr capsule TAKE 1 CAPSULE EVERY MORNING   • venlafaxine (EFFEXOR-XR) 75 mg 24 hr capsule TAKE 1 CAPSULE EVERY EVENING     Allergies   Allergen Reactions   • Aspartame - Food Allergy Headache   • Vancomycin Hives   • Penicillins Hives and Rash     Immunization History   Administered Date(s) Administered   • Pneumococcal Conjugate 13-Valent 2016   • Pneumococcal  "Polysaccharide PPV23 03/21/2017   • Tdap 12/13/2011   • Zoster 09/18/2016     Objective     /70 (BP Location: Left arm, Patient Position: Sitting, Cuff Size: Adult)   Pulse 82   Temp (!) 97.4 °F (36.3 °C)   Ht 5' 4.25\" (1.632 m)   Wt 66.9 kg (147 lb 6.4 oz)   SpO2 96%   BMI 25.10 kg/m²     Physical Exam  Vitals reviewed.   Constitutional:       Appearance: She is well-developed.   HENT:      Head: Normocephalic and atraumatic.      Right Ear: Tympanic membrane, ear canal and external ear normal.      Left Ear: Tympanic membrane, ear canal and external ear normal.      Nose: Nose normal.      Mouth/Throat:      Mouth: Mucous membranes are moist.   Eyes:      Extraocular Movements: Extraocular movements intact.      Conjunctiva/sclera: Conjunctivae normal.      Pupils: Pupils are equal, round, and reactive to light.   Neck:      Thyroid: No thyromegaly.      Vascular: No JVD.   Cardiovascular:      Rate and Rhythm: Normal rate and regular rhythm.      Pulses: Normal pulses.      Heart sounds: Normal heart sounds. No murmur heard.  Pulmonary:      Effort: Pulmonary effort is normal.      Breath sounds: Normal breath sounds. No wheezing.   Abdominal:      General: Bowel sounds are normal. There is no distension.      Palpations: Abdomen is soft. There is no mass.      Tenderness: There is no abdominal tenderness (mild epigastric area. No G/R.  No masses appreciated).   Musculoskeletal:         General: Tenderness (bilat shoulders. Decreased ROM on abduction and anterior flexion. Neg sulcus sign.) present. No swelling or deformity.      Cervical back: Normal range of motion and neck supple. No tenderness. No muscular tenderness.      Right lower leg: No edema.      Left lower leg: No edema.   Lymphadenopathy:      Cervical: No cervical adenopathy.   Skin:     General: Skin is warm.      Capillary Refill: Capillary refill takes less than 2 seconds.   Neurological:      General: No focal deficit present.      " Mental Status: She is alert and oriented to person, place, and time.      Cranial Nerves: No cranial nerve deficit.      Sensory: No sensory deficit.      Motor: No weakness or abnormal muscle tone.      Gait: Gait normal.   Psychiatric:         Behavior: Behavior normal.         Thought Content: Thought content normal.         Judgment: Judgment normal.      Comments: Mood labile

## 2024-08-20 PROBLEM — R10.13 EPIGASTRIC PAIN: Status: ACTIVE | Noted: 2024-08-20

## 2024-08-20 NOTE — ASSESSMENT & PLAN NOTE
Suspect RC cause. Offered injection - pt refused for now. Also offered PT, but pt states that she cannot afford it. Will continue home exercises, and Celebrex.  Recheck 3m - earlier if worse

## 2024-08-20 NOTE — ASSESSMENT & PLAN NOTE
?cause. No masses on exam. Check labs including lipase. Increase omeprazole to 40mg bid x 6-7d. Pt to call in 1w with f/u - consider GI eval and imaging if not improving in 1w

## 2024-08-20 NOTE — ASSESSMENT & PLAN NOTE
I reviewed with pt. Pain seems to be a primary trigger for worsening mood. Continue present care. Monitor. Recheck 3m

## 2024-08-21 ENCOUNTER — APPOINTMENT (OUTPATIENT)
Dept: LAB | Facility: CLINIC | Age: 69
End: 2024-08-21
Payer: COMMERCIAL

## 2024-08-21 DIAGNOSIS — R10.13 EPIGASTRIC PAIN: ICD-10-CM

## 2024-08-21 LAB
ALBUMIN SERPL BCG-MCNC: 3.9 G/DL (ref 3.5–5)
ALP SERPL-CCNC: 98 U/L (ref 34–104)
ALT SERPL W P-5'-P-CCNC: 8 U/L (ref 7–52)
ANION GAP SERPL CALCULATED.3IONS-SCNC: 11 MMOL/L (ref 4–13)
AST SERPL W P-5'-P-CCNC: 22 U/L (ref 13–39)
BASOPHILS # BLD AUTO: 0.05 THOUSANDS/ÂΜL (ref 0–0.1)
BASOPHILS NFR BLD AUTO: 1 % (ref 0–1)
BILIRUB SERPL-MCNC: 0.36 MG/DL (ref 0.2–1)
BUN SERPL-MCNC: 12 MG/DL (ref 5–25)
CALCIUM SERPL-MCNC: 9.1 MG/DL (ref 8.4–10.2)
CHLORIDE SERPL-SCNC: 108 MMOL/L (ref 96–108)
CO2 SERPL-SCNC: 26 MMOL/L (ref 21–32)
CREAT SERPL-MCNC: 0.94 MG/DL (ref 0.6–1.3)
EOSINOPHIL # BLD AUTO: 0.18 THOUSAND/ÂΜL (ref 0–0.61)
EOSINOPHIL NFR BLD AUTO: 2 % (ref 0–6)
ERYTHROCYTE [DISTWIDTH] IN BLOOD BY AUTOMATED COUNT: 14.3 % (ref 11.6–15.1)
GFR SERPL CREATININE-BSD FRML MDRD: 61 ML/MIN/1.73SQ M
GLUCOSE SERPL-MCNC: 86 MG/DL (ref 65–140)
HCT VFR BLD AUTO: 41.2 % (ref 34.8–46.1)
HGB BLD-MCNC: 12.9 G/DL (ref 11.5–15.4)
IMM GRANULOCYTES # BLD AUTO: 0.02 THOUSAND/UL (ref 0–0.2)
IMM GRANULOCYTES NFR BLD AUTO: 0 % (ref 0–2)
LIPASE SERPL-CCNC: 65 U/L (ref 11–82)
LYMPHOCYTES # BLD AUTO: 4.29 THOUSANDS/ÂΜL (ref 0.6–4.47)
LYMPHOCYTES NFR BLD AUTO: 57 % (ref 14–44)
MCH RBC QN AUTO: 29.6 PG (ref 26.8–34.3)
MCHC RBC AUTO-ENTMCNC: 31.3 G/DL (ref 31.4–37.4)
MCV RBC AUTO: 95 FL (ref 82–98)
MONOCYTES # BLD AUTO: 0.5 THOUSAND/ÂΜL (ref 0.17–1.22)
MONOCYTES NFR BLD AUTO: 7 % (ref 4–12)
NEUTROPHILS # BLD AUTO: 2.45 THOUSANDS/ÂΜL (ref 1.85–7.62)
NEUTS SEG NFR BLD AUTO: 33 % (ref 43–75)
NRBC BLD AUTO-RTO: 0 /100 WBCS
PLATELET # BLD AUTO: 376 THOUSANDS/UL (ref 149–390)
PMV BLD AUTO: 10.5 FL (ref 8.9–12.7)
POTASSIUM SERPL-SCNC: 4.3 MMOL/L (ref 3.5–5.3)
PROT SERPL-MCNC: 6.5 G/DL (ref 6.4–8.4)
RBC # BLD AUTO: 4.36 MILLION/UL (ref 3.81–5.12)
SODIUM SERPL-SCNC: 145 MMOL/L (ref 135–147)
WBC # BLD AUTO: 7.49 THOUSAND/UL (ref 4.31–10.16)

## 2024-08-21 PROCEDURE — 36415 COLL VENOUS BLD VENIPUNCTURE: CPT

## 2024-08-21 PROCEDURE — 80053 COMPREHEN METABOLIC PANEL: CPT

## 2024-08-21 PROCEDURE — 83690 ASSAY OF LIPASE: CPT

## 2024-08-21 PROCEDURE — 85025 COMPLETE CBC W/AUTO DIFF WBC: CPT

## 2024-08-22 ENCOUNTER — TELEPHONE (OUTPATIENT)
Dept: FAMILY MEDICINE CLINIC | Facility: CLINIC | Age: 69
End: 2024-08-22

## 2024-08-22 NOTE — TELEPHONE ENCOUNTER
----- Message from Dajuan Goodman MD sent at 8/22/2024  6:39 AM EDT -----  Labs look ok - how is patient feeling.  Still having epigastric pain?

## 2024-10-01 DIAGNOSIS — F41.9 ANXIETY: ICD-10-CM

## 2024-10-01 NOTE — TELEPHONE ENCOUNTER
1 [id number] 09/02/2024 08/02/2024 clonazePAM (Tablet) 30.0 30 0.5 MG NA [facility] Medicare 1 / 1 PA    1 [id number] 08/05/2024 08/02/2024 clonazePAM (Tablet) 30.0 30 0.5 MG RENNY SALCIDO [facility] Medicare 0 / 1 PA    1 [id number] 06/19/2024 05/22/2024 clonazePAM (Tablet) 30.0 30 0.5 MG NA [facility]NY [facility] 1 / 1 PA    1 [id number] 05/24/2024 05/22/2024 clonazePAM (Tablet) 30.0 30 0.5 MG RENNY SAL[facility]IDO [facility] Medicare 0 / 1 PA    1 [id number] 04/25/2024 03/20/2024 clonazePAM (Tablet) 30.0 30 0.5 MG RENNY SALCIDO [facility] 1 / 1 PA    1 [id number] 03/22/2024 03/20/2024 clonazePAM (Tablet) 30.0 30 0.5 MG RENNY SALCIDO [facility] Medicare 0 / 1 PA    1 [id number] 02/26/2024 01/18/2024 clonazePAM (Tablet) 30.0 30 0.5 MG RENNY SALCIDO [facility] Medicare 1 / 1 PA    1 [id number] 01/24/2024 01/18/2024 clonazePAM (Tablet) 30.0 30 0.5 MG RENNY SALCIDO [facility] Medicare 0 / 1 PA    1 [id number] 12/18/2023 10/20/2023 clonazePAM (Tablet) 30.0 30 0.5 MG NA JIMMY ROTH [facility] Medicare 2 / 2 PA    1 [id number] 11/16/2023 10/20/2023 clonazePAM (Tablet) 30.0 30 0.5 MG NA JIMMY ROTH [facility]. Medicare 1 / 2 PA

## 2024-10-02 RX ORDER — CLONAZEPAM 0.5 MG/1
0.5 TABLET ORAL
Qty: 30 TABLET | Refills: 0 | Status: SHIPPED | OUTPATIENT
Start: 2024-10-02

## 2024-10-03 NOTE — TELEPHONE ENCOUNTER
Patient received at message from [facility] that they cannot fill her clonazepam prescription. Patient is not sure why.    Please contact [facility] pharmacy to inquire. Patient is requesting a follow up phone call or [employer] message.

## 2024-10-07 DIAGNOSIS — G89.4 CHRONIC PAIN SYNDROME: Chronic | ICD-10-CM

## 2024-10-07 RX ORDER — GABAPENTIN 300 MG/1
300 CAPSULE ORAL 2 TIMES DAILY
Qty: 180 CAPSULE | Refills: 1 | Status: SHIPPED | OUTPATIENT
Start: 2024-10-07

## 2024-10-07 NOTE — TELEPHONE ENCOUNTER
Reason for call:   [x] Refill   [] Prior Auth  [] Other:     Office:   [x] PCP/Provider -  Dajuan Goodman MD   [] Specialty/Provider -     Medication:  gabapentin (NEURONTIN) 300 mg capsule    Dose/Frequency: seema 1 capsule (300 mg total) by mouth 2 (two) times a day,     Quantity: 180    Pharmacy: [facility] - [facility] [address]     Does the patient have enough for 3 days?   [] Yes   [x] No - Send as HP to POD

## 2024-10-08 ENCOUNTER — HOSPITAL ENCOUNTER (OUTPATIENT)
Dept: RADIOLOGY | Age: 69
Discharge: HOME | End: 2024-10-08
Payer: COMMERCIAL

## 2024-10-08 VITALS — BODY MASS INDEX: 26.05 KG/M2 | HEIGHT: 63 IN | WEIGHT: 147 LBS

## 2024-10-08 DIAGNOSIS — Z12.31 SCREENING MAMMOGRAM FOR BREAST CANCER: ICD-10-CM

## 2024-10-08 PROCEDURE — 77067 SCR MAMMO BI INCL CAD: CPT

## 2024-10-08 PROCEDURE — 77063 BREAST TOMOSYNTHESIS BI: CPT

## 2024-11-01 DIAGNOSIS — F41.9 ANXIETY: ICD-10-CM

## 2024-11-01 DIAGNOSIS — K21.9 GASTROESOPHAGEAL REFLUX DISEASE: ICD-10-CM

## 2024-11-01 DIAGNOSIS — J30.9 ALLERGIC RHINITIS, UNSPECIFIED SEASONALITY, UNSPECIFIED TRIGGER: ICD-10-CM

## 2024-11-01 DIAGNOSIS — F32.A DEPRESSION, UNSPECIFIED DEPRESSION TYPE: ICD-10-CM

## 2024-11-01 RX ORDER — OMEPRAZOLE 40 MG/1
40 CAPSULE, DELAYED RELEASE ORAL DAILY
Qty: 90 CAPSULE | Refills: 1 | Status: SHIPPED | OUTPATIENT
Start: 2024-11-01

## 2024-11-01 RX ORDER — FLUTICASONE PROPIONATE 50 MCG
2 SPRAY, SUSPENSION (ML) NASAL DAILY
Qty: 48 G | Refills: 1 | Status: SHIPPED | OUTPATIENT
Start: 2024-11-01

## 2024-11-01 RX ORDER — VENLAFAXINE HYDROCHLORIDE 75 MG/1
75 CAPSULE, EXTENDED RELEASE ORAL EVERY EVENING
Qty: 90 CAPSULE | Refills: 1 | Status: SHIPPED | OUTPATIENT
Start: 2024-11-01

## 2024-11-01 RX ORDER — VENLAFAXINE HYDROCHLORIDE 150 MG/1
150 CAPSULE, EXTENDED RELEASE ORAL EVERY MORNING
Qty: 90 CAPSULE | Refills: 1 | Status: SHIPPED | OUTPATIENT
Start: 2024-11-01

## 2024-11-01 NOTE — TELEPHONE ENCOUNTER
1 [id number] 10/04/2024 10/02/2024 clonazePAM (Tablet) 30.0 30 0.5 MG NA JIMMY ROTH [facility] Medicare 0 / 0 PA    1 [id number] 09/02/2024 08/02/2024 clonazePAM (Tablet) 30.0 30 0.5 MG NA RENNY DAVILA [facility] Medicare 1 / 1 PA    1 [id number] 08/05/2024 08/02/2024 clonazePAM (Tablet) 30.0 30 0.5 MG NA RENNY DAVILA [facility], [facility]. Medicare 0 / 1 PA    1 [id number] 06/19/2024 05/22/2024 clonazePAM (Tablet) 30.0 30 0.5 MG NA RENNY DAVILA [facility], [facility]. Medicare 1 / 1 PA    1 [id number] 05/24/2024 05/22/2024 clonazePAM (Tablet) 30.0 30 0.5 MG RENNY SALCIDO [facility], [facility]. Medicare 0 / 1 PA    1 [id number] 04/25/2024 03/20/2024 clonazePAM (Tablet) 30.0 30 0.5 MG RENNY SALCIDO [facility], [facility]. Medicare 1 / 1 PA    1 [id number] 03/22/2024 03/20/2024 clonazePAM (Tablet) 30.0 30 0.5 MG RENNY SALCIDO [facility], [facility]. Medicare 0 / 1 PA    1 [id number] 02/26/2024 01/18/2024 clonazePAM (Tablet) 30.0 30 0.5 MG RENNY SALCIDO [facility], [facility]. Medicare 1 / 1 PA    1 [id number] 01/24/2024 01/18/2024 clonazePAM (Tablet) 30.0 30 0.5 MG RENNY SALCIDO [facility], [facility]. Medicare 0 / 1 PA    1 [id number] 12/18/2023 10/20/2023 clonazePAM (Tablet) 30.0 30 0.5 MG NA JIMMY ROTH [facility], [facility]. Medicare 2 / 2 PA

## 2024-11-04 RX ORDER — CLONAZEPAM 0.5 MG/1
0.5 TABLET ORAL
Qty: 30 TABLET | Refills: 1 | Status: SHIPPED | OUTPATIENT
Start: 2024-11-04

## 2024-12-31 DIAGNOSIS — F41.9 ANXIETY: ICD-10-CM

## 2025-01-02 RX ORDER — CLONAZEPAM 0.5 MG/1
0.5 TABLET ORAL
Qty: 30 TABLET | Refills: 1 | Status: SHIPPED | OUTPATIENT
Start: 2025-01-02

## 2025-01-02 NOTE — TELEPHONE ENCOUNTER
1 [id number] 12/06/2024 11/04/2024 clonazePAM (Tablet) 30.0 30 0.5 MG [facility]. Medicare 1 / 1 PA    1 [id number] 11/06/2024 11/04/2024 clonazePAM (Tablet) 30.0 30 0.5 MG NA RENNY [facility]ILA [facility]. Medicare 0 / 1 PA    1 [id number] 10/04/2024 10/02/2024 clonazePAM (Tablet) 30.0 30 0.5 MG NA JIMMY ROTH [facility], [facility]. Medicare 0 / 0 PA    1 [id number] 09/02/2024 08/02/2024 clonazePAM (Tablet) 30.0 30 0.5 MG NA RENNY DAVILA [facility]. Medicare 1 / 1 PA    1 [id number] 08/05/2024 08/02/2024 clonazePAM (Tablet) 30.0 30 0.5 MG RENNY SALCIDO [facility], [facility]. Medicare 0 / 1 PA    1 [id number] 06/19/2024 05/22/2024 clonazePAM (Tablet) 30.0 30 0.5 MG RENNY SALCIDO [facility] Medicare 1 / 1 PA    1 [id number] 05/24/2024 05/22/2024 clonazePAM (Tablet) 30.0 30 0.5 MG RENNY SALCIDO [facility], [facility]. Medicare 0 / 1 PA    1 [id number] 04/25/2024 03/20/2024 clonazePAM (Tablet) 30.0 30 0.5 MG RENNY SALCIDO [facility], [facility]. Medicare 1 / 1 PA    1 [id number] 03/22/2024 03/20/2024 clonazePAM (Tablet) 30.0 30 0.5 MG RENNY SALCIDO [facility], [facility]. Medicare 0 / 1 PA    1 [id number] 02/26/2024 01/18/2024 clonazePAM (Tablet) 30.0 30 0.5 MG NA RENNY DAVILA [facility], [facility]. Medicare 1 / 1 PA

## 2025-01-08 ENCOUNTER — TELEPHONE (OUTPATIENT)
Dept: ADMINISTRATIVE | Facility: OTHER | Age: 70
End: 2025-01-08

## 2025-01-08 NOTE — TELEPHONE ENCOUNTER
01/08/25 2:00 PM    Patient contacted to bring Advance Directive, POLST, or Living Will document to next scheduled pcp visit.[facility] Department left message.    Thank you.  Bozena Brar MA  [facility]

## 2025-01-17 DIAGNOSIS — F41.9 ANXIETY: ICD-10-CM

## 2025-01-17 RX ORDER — CLONAZEPAM 0.5 MG/1
0.5 TABLET ORAL
Qty: 90 TABLET | Refills: 1 | Status: SHIPPED | OUTPATIENT
Start: 2025-01-17

## 2025-01-27 DIAGNOSIS — F41.9 ANXIETY: ICD-10-CM

## 2025-01-28 RX ORDER — CLONAZEPAM 0.5 MG/1
0.5 TABLET ORAL
Qty: 90 TABLET | Refills: 1 | Status: SHIPPED | OUTPATIENT
Start: 2025-01-28

## 2025-01-28 RX ORDER — CLONAZEPAM 0.5 MG/1
0.5 TABLET ORAL
Qty: 10 TABLET | Refills: 0 | OUTPATIENT
Start: 2025-01-28

## 2025-01-28 NOTE — TELEPHONE ENCOUNTER
Not a duplicate- pls see comments  1 [id number] 12/06/2024 11/04/2024 clonazePAM (Tablet) 30.0 30 0.5 MG RENNY SALCIDO [facility] Medicare 1 / 1 PA    1 [id number] 11/06/2024 11/04/2024 clonazePAM (Tablet) 30.0 30 0.5 MG NA RENNY DAVILA [facility] Medicare 0 / 1 PA    1 [id number] 10/04/2024 10/02/2024 clonazePAM (Tablet) 30.0 30 0.5 MG NA JIMMY ROTH [facility], [facility]. Medicare 0 / 0 PA    1 [id number] 09/02/2024 08/02/2024 clonazePAM (Tablet) 30.0 30 0.5 MG NA RENNY DAVILA [facility] Medicare 1 / 1 PA    1 [id number] 08/05/2024 08/02/2024 clonazePAM (Tablet) 30.0 30 0.5 MG RENNY SALCIDO [facility] Medicare 0 / 1 PA    1 [id number] 06/19/2024 05/22/2024 clonazePAM (Tablet) 30.0 30 0.5 MG [facility]ENNY [facility]IDO [facility] Medicare 1 / 1 PA    1 [id number] 05/24/2024 05/22/2024 clonazePAM (Tablet) 30.0 30 0.5 MG RENNY SALCIDO [facility] Medicare 0 / 1 PA    1 [id number] 04/25/2024 03/20/2024 clonazePAM (Tablet) 30.0 30 0.5 MG RENNY SALCIDO [facility] Medicare 1 / 1 PA    1 [id number] 03/22/2024 03/20/2024 clonazePAM (Tablet) 30.0 30 0.5 MG RENNY SALCIDO [facility] Medicare 0 / 1 PA    1 [id number] 02/26/2024 01/18/2024 clonazePAM (Tablet) 30.0 30 0.5 MG NA RENNY DAVILA [facility] Medicare 1 / 1 PA

## 2025-01-29 NOTE — TELEPHONE ENCOUNTER
Patient called requesting refill for clonazepam. Patient made aware medication was refilled on 1/28/25 for 90 with 1 refills to [facility] pharmacy. Patient instructed to contact the pharmacy to obtain refills of medication. Patient verbalized understanding.

## 2025-02-17 ENCOUNTER — OFFICE VISIT (OUTPATIENT)
Dept: FAMILY MEDICINE CLINIC | Facility: CLINIC | Age: 70
End: 2025-02-17
Payer: COMMERCIAL

## 2025-02-17 VITALS
DIASTOLIC BLOOD PRESSURE: 72 MMHG | SYSTOLIC BLOOD PRESSURE: 112 MMHG | HEART RATE: 78 BPM | WEIGHT: 148.2 LBS | OXYGEN SATURATION: 98 % | BODY MASS INDEX: 26.26 KG/M2 | HEIGHT: 63 IN | TEMPERATURE: 97.3 F

## 2025-02-17 DIAGNOSIS — F33.2 SEVERE EPISODE OF RECURRENT MAJOR DEPRESSIVE DISORDER, WITHOUT PSYCHOTIC FEATURES (HCC): ICD-10-CM

## 2025-02-17 DIAGNOSIS — K21.9 GASTROESOPHAGEAL REFLUX DISEASE WITHOUT ESOPHAGITIS: ICD-10-CM

## 2025-02-17 DIAGNOSIS — R53.83 FATIGUE, UNSPECIFIED TYPE: ICD-10-CM

## 2025-02-17 DIAGNOSIS — E78.49 OTHER HYPERLIPIDEMIA: ICD-10-CM

## 2025-02-17 DIAGNOSIS — M70.62 TROCHANTERIC BURSITIS OF LEFT HIP: ICD-10-CM

## 2025-02-17 DIAGNOSIS — Z00.00 MEDICARE ANNUAL WELLNESS VISIT, SUBSEQUENT: Primary | ICD-10-CM

## 2025-02-17 DIAGNOSIS — M51.16 LUMBAR DISC DISEASE WITH RADICULOPATHY: ICD-10-CM

## 2025-02-17 PROCEDURE — 99214 OFFICE O/P EST MOD 30 MIN: CPT | Performed by: FAMILY MEDICINE

## 2025-02-17 PROCEDURE — G0439 PPPS, SUBSEQ VISIT: HCPCS | Performed by: FAMILY MEDICINE

## 2025-02-17 PROCEDURE — G2211 COMPLEX E/M VISIT ADD ON: HCPCS | Performed by: FAMILY MEDICINE

## 2025-02-17 NOTE — PROGRESS NOTES
Name: Diane E Magill      : 1955      MRN: [id number]  Encounter Provider: Dajuan Goodman MD  Encounter Date: 2025   Encounter department: [facility]    Assessment & Plan  Medicare annual wellness visit, subsequent         Gastroesophageal reflux disease without esophagitis  As cause of epigastric discomfort?  Symptoms sl better. Exam unremarkable. Continue omeprazole       Lumbar disc disease with radiculopathy  I reviewed with pt. Continue present care. Avoid exacerbating positions/activities       Trochanteric bursitis of left hip  I reviewed with pt.  Continue conservative care. Discussed treatment options including injections.  Monitor.  Recheck 3 m       Severe episode of recurrent major depressive disorder, without psychotic features (HCC)  Depression Screening Follow-up Plan: Patient's depression screening was positive with a PHQ-9 score of 21. Patient assessed for underlying major depression. They have no active suicidal ideations. Brief counseling provided and recommend additional follow-up/re-evaluation next office visit. Worse recently due to lack of sleep?  Pt now sleeping better (once she received her clonazepam). Pt denies suicidal thought/plan.   Continue present care. Recheck 3m -- earlier if worse    Orders:  •  CBC and differential; Future  •  Comprehensive metabolic panel; Future  •  TSH, 3rd generation with Free T4 reflex; Future    Fatigue, unspecified type  Related to poor sleep and worsening depression?  Better now that she is sleeping better?   I reviewed with pt. Check labs. Recheck 3m  Orders:  •  CBC and differential; Future  •  Comprehensive metabolic panel; Future  •  TSH, 3rd generation with Free T4 reflex; Future    Other hyperlipidemia    Orders:  •  Lipid panel; Future       Preventive health issues were discussed with patient, and age appropriate screening tests were ordered as noted in patient's After Visit Summary. Personalized  "health advice and appropriate referrals for health education or preventive services given if needed, as noted in patient's After Visit Summary.    History of Present Illness     f/u multiple med issues and AWV  - pt states that her mood is a little worse. She was out of clonazepam (insurance would not fill until she paid her balance) since early January.  Because of this, patient was unable to sleep.  This resulted in worsening mood.  Patient states that she was finally able to rectify the situation and received her meds this past weekend.  This past Saturday was the first night that she was able to sleep soundly in a month and a half.  PHQ done.  - pt still has postprandial epigastric discomfort \"once in a while\".  Pt denies N/V, or change in BM. Due or colonoscopy in November  - pt has not been exercising regularly. She denies CP, palpitations, lightheadedness or other CV symptoms with or without exertion  - still struggles with low back pain.  Uses cane for ambulation. \"I cannot even mop my kitchen floor\".   - AWV done       Patient Care Team:  Dajuan Goodman MD as PCP - General  Peewee Stanley MD    Review of Systems   Constitutional:  Positive for fatigue. Negative for activity change and appetite change.   HENT: Negative.     Eyes: Negative.    Respiratory: Negative.     Cardiovascular: Negative.    Gastrointestinal:  Positive for abdominal pain (occasional epigatric). Negative for abdominal distention, constipation, diarrhea, nausea and vomiting.   Genitourinary: Negative.    Musculoskeletal:  Positive for arthralgias, back pain and myalgias.   Skin: Negative.    Neurological:  Negative for dizziness, weakness, light-headedness, numbness and headaches.   Psychiatric/Behavioral:  Positive for dysphoric mood (sl worse recently) and sleep disturbance.      Medical History Reviewed by provider this encounter:  Tobacco  Allergies  Meds  Problems  Med Hx  Surg Hx  Fam Hx       Annual Wellness " Visit Questionnaire   Aditi is here for her Subsequent Wellness visit. Last Medicare Wellness visit information reviewed, patient interviewed and updates made to the record today.      Health Risk Assessment:   Patient rates overall health as good. Patient feels that their physical health rating is much worse. Patient is satisfied with their life. Eyesight was rated as same. Hearing was rated as same. Patient feels that their emotional and mental health rating is much worse. Patients states they are never, rarely angry. Patient states they are often unusually tired/fatigued. Pain experienced in the last 7 days has been a lot. Patient's pain rating has been 8/10. Patient states that she has experienced no weight loss or gain in last 6 months. Low back and L thigh pain    Depression Screening:   PHQ-9 Score: 21      Fall Risk Screening:   In the past year, patient has experienced: no history of falling in past year      Urinary Incontinence Screening:   Patient has not leaked urine accidently in the last six months.     Home Safety:  Patient has trouble with stairs inside or outside of their home. Patient has working smoke alarms and has no working carbon monoxide detector. Home safety hazards include: none.     Nutrition:   Current diet is Unhealthy. No appetite    Medications:   Patient is currently taking over-the-counter supplements. OTC medications include: see medication list. Patient is able to manage medications.     Activities of Daily Living (ADLs)/Instrumental Activities of Daily Living (IADLs):   Walk and transfer into and out of bed and chair?: Yes  Dress and groom yourself?: Yes    Bathe or shower yourself?: Yes    Feed yourself? Yes  Do your laundry/housekeeping?: Yes  Manage your money, pay your bills and track your expenses?: Yes  Make your own meals?: Yes    Do your own shopping?: Yes    Previous Hospitalizations:   Any hospitalizations or ED visits within the last 12 months?: No      Advance Care  Planning:   Living will: No    Durable POA for healthcare: No    Advanced directive: No    Advanced directive counseling given: Yes      Cognitive Screening:   Provider or family/friend/caregiver concerned regarding cognition?: No    PREVENTIVE SCREENINGS      Cardiovascular Screening:    General: Screening Not Indicated and History Lipid Disorder      Diabetes Screening:     General: Screening Current      Colorectal Cancer Screening:     General: Screening Current      Breast Cancer Screening:     General: Screening Current      Cervical Cancer Screening:    General: Screening Not Indicated      Osteoporosis Screening:    General: Screening Not Indicated and History Osteoporosis      Abdominal Aortic Aneurysm (AAA) Screening:        General: Screening Not Indicated      Lung Cancer Screening:     General: Screening Not Indicated      Hepatitis C Screening:    General: Screening Current    Screening, Brief Intervention, and Referral to Treatment (SBIRT)     Screening      AUDIT-C Screenin) How often did you have a drink containing alcohol in the past year? never  2) How many drinks did you have on a typical day when you were drinking in the past year? 0  3) How often did you have 6 or more drinks on one occasion in the past year? never    AUDIT-C Score: 0  Interpretation: Score 0-2 (female): Negative screen for alcohol misuse    Single Item Drug Screening:  How often have you used an illegal drug (including marijuana) or a prescription medication for non-medical reasons in the past year? never    Single Item Drug Screen Score: 0  Interpretation: Negative screen for possible drug use disorder    Time Spent  Time spent screening/evaluating the patient for alcohol misuse: 1 minutes.     Other Counseling Topics:   Calcium and vitamin D intake and regular weightbearing exercise.     Social Drivers of Health     Financial Resource Strain: Low Risk  (2024)    Overall Financial Resource Strain (CARDIA)    •  "Difficulty of Paying Living Expenses: Not hard at all   Food Insecurity: No Food Insecurity (2/17/2025)    Hunger Vital Sign    • Worried About Running Out of Food in the Last Year: Never true    • Ran Out of Food in the Last Year: Never true   Transportation Needs: No Transportation Needs (2/17/2025)    PRAPARE - Transportation    • Lack of Transportation (Medical): No    • Lack of Transportation (Non-Medical): No   Housing Stability: Low Risk  (2/17/2025)    Housing Stability Vital Sign    • Unable to Pay for Housing in the Last Year: No    • Number of Times Moved in the Last Year: 0    • Homeless in the Last Year: No   Utilities: Not At Risk (2/17/2025)    [facility] Utilities    • Threatened with loss of utilities: No     No results found.    Objective   /72   Pulse 78   Temp (!) 97.3 °F (36.3 °C)   Ht 5' 3\" (1.6 m)   Wt 67.2 kg (148 lb 3.2 oz)   SpO2 98%   BMI 26.25 kg/m²     Physical Exam  Vitals reviewed.   HENT:      Head: Normocephalic.      Right Ear: Tympanic membrane, ear canal and external ear normal.      Left Ear: Tympanic membrane, ear canal and external ear normal.      Nose: Nose normal.      Comments: Mild irritation of the anterior nare without ulcer     Mouth/Throat:      Mouth: Mucous membranes are moist.      Pharynx: No oropharyngeal exudate or posterior oropharyngeal erythema.      Comments: Dilated vein on the R subungual surface  Eyes:      General: No scleral icterus.     Extraocular Movements: Extraocular movements intact.      Conjunctiva/sclera: Conjunctivae normal.      Pupils: Pupils are equal, round, and reactive to light.   Neck:      Vascular: No carotid bruit.   Cardiovascular:      Rate and Rhythm: Normal rate and regular rhythm.   Pulmonary:      Effort: Pulmonary effort is normal.   Abdominal:      General: There is no distension.      Palpations: There is no mass.      Tenderness: There is no abdominal tenderness. There is no right CVA tenderness or left CVA tenderness. "   Musculoskeletal:         General: Tenderness (TP over the low lumbar spine and L greater trochanter) and deformity (few DIP mucinous cysts in L fingers) present.      Cervical back: No tenderness.      Right lower leg: No edema.      Left lower leg: No edema.   Lymphadenopathy:      Cervical: No cervical adenopathy.   Skin:     General: Skin is warm.      Capillary Refill: Capillary refill takes less than 2 seconds.   Neurological:      Mental Status: She is alert.      Cranial Nerves: No cranial nerve deficit.      Sensory: No sensory deficit.      Motor: No weakness.      Gait: Gait abnormal (mildly antalgic appearing gait).   Psychiatric:         Behavior: Behavior normal.         Thought Content: Thought content normal.         Judgment: Judgment normal.      Comments: PHQ-2/9 Depression Screening    Little interest or pleasure in doing things: 3 - nearly every day  Feeling down, depressed, or hopeless: 3 - nearly every day  Trouble falling or staying asleep, or sleeping too much: 1 - several days  Feeling tired or having little energy: 3 - nearly every day  Poor appetite or overeating: 3 - nearly every day  Feeling bad about yourself - or that you are a failure or have let   yourself or your family down: 3 - nearly every day  Trouble concentrating on things, such as reading the newspaper or watching   television: 3 - nearly every day  Moving or speaking so slowly that other people could have noticed. Or the   opposite - being so fidgety or restless that you have been moving around a   lot more than usual: 2 - more than half the days  Thoughts that you would be better off dead, or of hurting yourself in some   way: 0 - not at all  PHQ-9 Score: 21  PHQ-9 Interpretation: Severe depression

## 2025-02-19 PROBLEM — F33.2 SEVERE EPISODE OF RECURRENT MAJOR DEPRESSIVE DISORDER, WITHOUT PSYCHOTIC FEATURES (HCC): Status: ACTIVE | Noted: 2017-10-31

## 2025-02-19 NOTE — ASSESSMENT & PLAN NOTE
Depression Screening Follow-up Plan: Patient's depression screening was positive with a PHQ-9 score of 21. Patient assessed for underlying major depression. They have no active suicidal ideations. Brief counseling provided and recommend additional follow-up/re-evaluation next office visit. Worse recently due to lack of sleep?  Pt now sleeping better (once she received her clonazepam). Pt denies suicidal thought/plan.   Continue present care. Recheck 3m -- earlier if worse    Orders:  •  CBC and differential; Future  •  Comprehensive metabolic panel; Future  •  TSH, 3rd generation with Free T4 reflex; Future

## 2025-02-19 NOTE — ASSESSMENT & PLAN NOTE
I reviewed with pt.  Continue conservative care. Discussed treatment options including injections.  Monitor.  Recheck 3 m

## 2025-02-19 NOTE — ASSESSMENT & PLAN NOTE
Related to poor sleep and worsening depression?  Better now that she is sleeping better?   I reviewed with pt. Check labs. Recheck 3m  Orders:  •  CBC and differential; Future  •  Comprehensive metabolic panel; Future  •  TSH, 3rd generation with Free T4 reflex; Future

## 2025-03-04 ENCOUNTER — TELEPHONE (OUTPATIENT)
Dept: OTHER | Facility: OTHER | Age: 70
End: 2025-03-04

## 2025-03-04 NOTE — TELEPHONE ENCOUNTER
Patient called saying that Dr. Goodman wanted he to give him a call back with an update on how she is feeling. Patient stated that she is not feeling any better.

## 2025-03-05 ENCOUNTER — TELEPHONE (OUTPATIENT)
Dept: FAMILY MEDICINE CLINIC | Facility: CLINIC | Age: 70
End: 2025-03-05

## 2025-03-31 DIAGNOSIS — K21.9 GASTROESOPHAGEAL REFLUX DISEASE: ICD-10-CM

## 2025-03-31 DIAGNOSIS — M51.16 LUMBAR DISC DISEASE WITH RADICULOPATHY: ICD-10-CM

## 2025-03-31 DIAGNOSIS — M70.62 TROCHANTERIC BURSITIS OF LEFT HIP: ICD-10-CM

## 2025-03-31 DIAGNOSIS — E78.00 HYPERCHOLESTEROLEMIA: ICD-10-CM

## 2025-03-31 DIAGNOSIS — R05.9 COUGH: ICD-10-CM

## 2025-03-31 DIAGNOSIS — F32.A DEPRESSION, UNSPECIFIED DEPRESSION TYPE: ICD-10-CM

## 2025-03-31 DIAGNOSIS — G89.4 CHRONIC PAIN SYNDROME: Chronic | ICD-10-CM

## 2025-03-31 DIAGNOSIS — M81.0 AGE-RELATED OSTEOPOROSIS WITHOUT CURRENT PATHOLOGICAL FRACTURE: ICD-10-CM

## 2025-03-31 DIAGNOSIS — J30.9 ALLERGIC RHINITIS, UNSPECIFIED SEASONALITY, UNSPECIFIED TRIGGER: ICD-10-CM

## 2025-03-31 RX ORDER — FLUTICASONE PROPIONATE 50 MCG
2 SPRAY, SUSPENSION (ML) NASAL DAILY
Qty: 48 G | Refills: 1 | Status: SHIPPED | OUTPATIENT
Start: 2025-03-31

## 2025-03-31 RX ORDER — OMEPRAZOLE 40 MG/1
40 CAPSULE, DELAYED RELEASE ORAL DAILY
Qty: 90 CAPSULE | Refills: 1 | Status: SHIPPED | OUTPATIENT
Start: 2025-03-31

## 2025-03-31 RX ORDER — VENLAFAXINE HYDROCHLORIDE 150 MG/1
150 CAPSULE, EXTENDED RELEASE ORAL EVERY MORNING
Qty: 90 CAPSULE | Refills: 1 | Status: SHIPPED | OUTPATIENT
Start: 2025-03-31

## 2025-03-31 RX ORDER — GABAPENTIN 300 MG/1
300 CAPSULE ORAL 2 TIMES DAILY
Qty: 180 CAPSULE | Refills: 1 | Status: SHIPPED | OUTPATIENT
Start: 2025-03-31

## 2025-03-31 RX ORDER — VENLAFAXINE HYDROCHLORIDE 75 MG/1
75 CAPSULE, EXTENDED RELEASE ORAL EVERY EVENING
Qty: 90 CAPSULE | Refills: 1 | Status: SHIPPED | OUTPATIENT
Start: 2025-03-31

## 2025-03-31 RX ORDER — ATORVASTATIN CALCIUM 20 MG/1
20 TABLET, FILM COATED ORAL DAILY
Qty: 30 TABLET | Refills: 0 | Status: SHIPPED | OUTPATIENT
Start: 2025-03-31

## 2025-03-31 RX ORDER — CELECOXIB 200 MG/1
200 CAPSULE ORAL DAILY
Qty: 90 CAPSULE | Refills: 1 | Status: SHIPPED | OUTPATIENT
Start: 2025-03-31

## 2025-03-31 NOTE — TELEPHONE ENCOUNTER
Reason for call:   [x] Refill   [] Prior Auth  [x] Other: not duplicate - PHARMACY CHANGE - MAIL ORDER - 90 DAY SUPPLY     Office:   [x] PCP/Provider - Meaghan Moon / [facility] VOGT   [] Specialty/Provider -     Medication:     venlafaxine (EFFEXOR-XR) 75 mg 24 hr capsule       Dose/Frequency:  TAKE 1 CAPSULE EVERY EVENING,     Quantity: 90    Pharmacy:     venlafaxine (EFFEXOR-XR) 150 mg 24 hr capsule       Medication: omeprazole (PriLOSEC) 40 MG capsule     Dose/Frequency: TAKE 1 CAPSULE EVERY DAY,     Quantity: 90    Medication:     loratadine (CLARITIN) 10 mg tablet       Dose/Frequency:  Take 1 tablet (10 mg total) by mouth daily,     Quantity: 90      Medication     ibandronate (BONIVA) 150 MG tablet       Dose/Frequency: TAKE 1 TABLET EVERY 30 DAYS,     Quantity: 3      Medication:     gabapentin (NEURONTIN) 300 mg capsule       Dose/Frequency:  Take 1 capsule (300 mg total) by mouth 2 (two) times a day,     Quantity: 180      Medication:     fluticasone (FLONASE) 50 mcg/act nasal spray       Dose/Frequency: USE 2 SPRAYS IN EACH NOSTRIL EVERY DAY,     Quantity: 48        Medication: celecoxib (CeleBREX) 200 mg capsule     Dose/Frequency:  Take 1 capsule (200 mg total) by mouth daily,     Quantity: 90      Medication: atorvastatin (LIPITOR) 20 mg tablet     Dose/Frequency:  Take 1 tablet (20 mg total) by mouth daily,     Quantity: 90    Medication: omeprazole (PriLOSEC) 40 MG capsule     Dose/Frequency:  Take 1 capsule  mouth daily,     Quantity: 90        Pharmacy: [facility] - [place] - [facility]        Mail ORDER Pharmacy   Does the patient have enough for 10 days?   [] Yes   [x] No - Send as HP to POD

## 2025-04-01 RX ORDER — IBANDRONATE SODIUM 150 MG/1
150 TABLET, FILM COATED ORAL
Qty: 3 TABLET | Refills: 1 | Status: SHIPPED | OUTPATIENT
Start: 2025-04-01

## 2025-04-01 RX ORDER — LORATADINE 10 MG/1
10 TABLET ORAL DAILY
Qty: 90 TABLET | Refills: 0 | Status: SHIPPED | OUTPATIENT
Start: 2025-04-01

## 2025-05-15 ENCOUNTER — RA CDI HCC (OUTPATIENT)
Dept: OTHER | Facility: HOSPITAL | Age: 70
End: 2025-05-15

## 2025-05-21 DIAGNOSIS — E78.00 HYPERCHOLESTEROLEMIA: ICD-10-CM

## 2025-05-21 NOTE — TELEPHONE ENCOUNTER
Reason for call:   [x] Refill   [] Prior Auth  [] Other:     Office:   [x] PCP/Provider - Dr Goodman   [] Specialty/Provider -     Medication: atorvastatin     Dose/Frequency: 20 mg take daily     Quantity: 90    Pharmacy: [facility]   Does the patient have enough for 3 days?   [] Yes   [] No - Send as HP to POD    Mail Away Pharmacy   Does the patient have enough for 10 days?   [x] Yes   [] No - Send as HP to POD

## 2025-05-22 ENCOUNTER — OFFICE VISIT (OUTPATIENT)
Dept: FAMILY MEDICINE CLINIC | Facility: CLINIC | Age: 70
End: 2025-05-22
Payer: COMMERCIAL

## 2025-05-22 VITALS
SYSTOLIC BLOOD PRESSURE: 110 MMHG | HEIGHT: 63 IN | OXYGEN SATURATION: 99 % | BODY MASS INDEX: 26.26 KG/M2 | WEIGHT: 148.2 LBS | DIASTOLIC BLOOD PRESSURE: 72 MMHG | TEMPERATURE: 97.3 F | HEART RATE: 77 BPM

## 2025-05-22 DIAGNOSIS — F33.1 MODERATE EPISODE OF RECURRENT MAJOR DEPRESSIVE DISORDER (HCC): ICD-10-CM

## 2025-05-22 DIAGNOSIS — M54.50 ACUTE MIDLINE LOW BACK PAIN WITHOUT SCIATICA: Primary | ICD-10-CM

## 2025-05-22 DIAGNOSIS — F41.9 ANXIETY: ICD-10-CM

## 2025-05-22 PROCEDURE — 96372 THER/PROPH/DIAG INJ SC/IM: CPT | Performed by: FAMILY MEDICINE

## 2025-05-22 PROCEDURE — 99214 OFFICE O/P EST MOD 30 MIN: CPT | Performed by: FAMILY MEDICINE

## 2025-05-22 RX ORDER — ATORVASTATIN CALCIUM 20 MG/1
20 TABLET, FILM COATED ORAL DAILY
Qty: 90 TABLET | Refills: 1 | Status: SHIPPED | OUTPATIENT
Start: 2025-05-22

## 2025-05-22 RX ORDER — VENLAFAXINE HYDROCHLORIDE 150 MG/1
150 CAPSULE, EXTENDED RELEASE ORAL 2 TIMES DAILY
Qty: 180 CAPSULE | Refills: 1 | Status: SHIPPED | OUTPATIENT
Start: 2025-05-22

## 2025-05-22 RX ORDER — METHYLPREDNISOLONE ACETATE 80 MG/ML
80 INJECTION, SUSPENSION INTRA-ARTICULAR; INTRALESIONAL; INTRAMUSCULAR; SOFT TISSUE ONCE
Status: COMPLETED | OUTPATIENT
Start: 2025-05-22 | End: 2025-05-22

## 2025-05-22 RX ORDER — PREDNISONE 20 MG/1
TABLET ORAL
Qty: 12 TABLET | Refills: 0 | Status: SHIPPED | OUTPATIENT
Start: 2025-05-22

## 2025-05-22 RX ADMIN — METHYLPREDNISOLONE ACETATE 80 MG: 80 INJECTION, SUSPENSION INTRA-ARTICULAR; INTRALESIONAL; INTRAMUSCULAR; SOFT TISSUE at 13:38

## 2025-05-22 NOTE — PROGRESS NOTES
"Name: Diane E Magill      : 1955      MRN: [id number]  Encounter Provider: Dajuan Goodman MD  Encounter Date: 2025   Encounter department: [facility]    Assessment & Plan  Acute midline low back pain without sciatica  I reviewed with pt.  Depomedrol given.  Start pred taper tomorrow  CheckXR of LS spine. Consider PT. Recheck 1-2w if not improving  Orders:  •  predniSONE 20 mg tablet; 3 tab po qd x 2 then 2 tab po qd x 2 then 1 tab po qd x 2  •  XR spine lumbar 2 or 3 views injury; Future  •  methylPREDNISolone acetate (DEPO-MEDROL) injection 80 mg    Moderate episode of recurrent major depressive disorder (HCC)  Depression Screening Follow-up Plan: Patient's depression screening was positive with a PHQ-9 score of 16. I reviewed with pt.  Will increase Effexor XR mt662cm bid.  I reviewed side effects.  Recheck 4-6w    Orders:  •  venlafaxine (EFFEXOR-XR) 150 mg 24 hr capsule; Take 1 capsule (150 mg total) by mouth 2 (two) times a day    Anxiety  EBONIE-7 = 17.Increase Effexor XR to 150mg bid.  Recheck 1m if not improving.  Earlier if worse            History of Present Illness     f/u multiple med issues   - pt with acute low back pain that started right before she left to come to this visit.  Patient states she was putting on her jacket, twisted and developed acute pain in her lower back.  It does not radiate to her legs.  No weakness, numbness or other issues noted at present.  -  pt continues to struggle with her mood \"every day\".  Patient states that sleep is better but now she feels she may be sleeping too much.  Patient has been compliant with her Effexor XR, 150 mg every morning with 75 mg every afternoon.  - pt has been walking more since our last visit.  Patient was disappointed however that weight seems to have gone up a little bit since last visit.  Distances can be adversely affected by hip bursa pain.  She denies any chest pains, palpitations, lightheadedness " "or other cardiovascular symptoms with or without exertion  - pt struggles to eat 3meal a day, b/c it \"is too much\".  Typically eats breakfast and dinner.  Still with some constipation issues      Review of Systems   Constitutional:  Positive for fatigue. Negative for chills, fever and unexpected weight change.   HENT: Negative.     Respiratory: Negative.     Cardiovascular: Negative.    Gastrointestinal: Negative.    Genitourinary: Negative.    Musculoskeletal:  Positive for arthralgias, back pain and myalgias.   Skin: Negative.    Neurological:  Negative for weakness and numbness.   Psychiatric/Behavioral:  Positive for dysphoric mood and sleep disturbance. The patient is nervous/anxious.      Past Medical History[1]  Past Surgical History[2]  Family History[3]  Social History[4]  Medications[5]  Allergies   Allergen Reactions   • Aspartame - Food Allergy Headache   • Vancomycin Hives   • Penicillins Hives and Rash     Immunization History   Administered Date(s) Administered   • Pneumococcal Conjugate 13-Valent 08/24/2016   • Pneumococcal Polysaccharide PPV23 03/21/2017   • Tdap 12/13/2011   • Zoster 09/18/2016     Objective   /72   Pulse 77   Temp (!) 97.3 °F (36.3 °C)   Ht 5' 3\" (1.6 m)   Wt 67.2 kg (148 lb 3.2 oz)   SpO2 99%   BMI 26.25 kg/m²     Physical Exam  Vitals reviewed.   Constitutional:       Comments: Mildly uncomfortable appearing female in NAD   HENT:      Head: Normocephalic.      Right Ear: Tympanic membrane, ear canal and external ear normal.      Left Ear: Tympanic membrane, ear canal and external ear normal.      Nose: Nose normal.      Mouth/Throat:      Mouth: Mucous membranes are moist.     Eyes:      Extraocular Movements: Extraocular movements intact.      Conjunctiva/sclera: Conjunctivae normal.      Pupils: Pupils are equal, round, and reactive to light.       Cardiovascular:      Rate and Rhythm: Normal rate and regular rhythm.      Pulses: Normal pulses.   Pulmonary:      " Effort: Pulmonary effort is normal.   Abdominal:      General: There is no distension.      Palpations: There is no mass.      Tenderness: There is no abdominal tenderness.     Musculoskeletal:         General: Tenderness ((+) TTP over thelowlumbar spine, paraspinal muscles and SI joints. Active rotation and bend worsens pain) and deformity present.      Cervical back: No tenderness.      Right lower leg: No edema.      Left lower leg: No edema.   Lymphadenopathy:      Cervical: No cervical adenopathy.     Skin:     General: Skin is warm.     Neurological:      General: No focal deficit present.      Mental Status: She is alert and oriented to person, place, and time.      Cranial Nerves: No cranial nerve deficit.      Sensory: No sensory deficit.      Motor: No weakness.      Gait: Gait abnormal (mildly antalgic appearing gait).      Deep Tendon Reflexes: Reflexes normal.     Psychiatric:         Behavior: Behavior normal.         Thought Content: Thought content normal.         Judgment: Judgment normal.      Comments: PHQ-2/9 Depression Screening    Little interest or pleasure in doing things: 3 - nearly every day  Feeling down, depressed, or hopeless: 3 - nearly every day  Trouble falling or staying asleep, or sleeping too much: 3 - nearly every   day  Feeling tired or having little energy: 3 - nearly every day  Poor appetite or overeatin - several days  Feeling bad about yourself - or that you are a failure or have let   yourself or your family down: 0 - not at all  Trouble concentrating on things, such as reading the newspaper or watching   television: 3 - nearly every day  Moving or speaking so slowly that other people could have noticed. Or the   opposite - being so fidgety or restless that you have been moving around a   lot more than usual: 0 - not at all  Thoughts that you would be better off dead, or of hurting yourself in some   way: 0 - not at all  PHQ-9 Score: 16  PHQ-9 Interpretation: Moderately  severe depression     EBONIE-7 Flowsheet Screening    Flowsheet Row Most Recent Value   Over the last two weeks, how often have you been bothered by the following   problems?     Feeling nervous, anxious, or on edge 3   Not being able to stop or control worrying 3   Worrying too much about different things 3   Trouble relaxing  3   Being so restless that it's hard to sit still 0   Becoming easily annoyed or irritable  2   Feeling afraid as if something awful might happen 3   How difficult have these problems made it for you to do your work, take   care of things at home, or get along with other people?  Somewhat   difficult   EBONIE Score  17                      [1]  Past Medical History:  Diagnosis Date   • Anxiety    • Depression    • Fractures    • GERD (gastroesophageal reflux disease)    • Hyperlipidemia    • Low back pain    • Panic attacks    • Psychiatric disorder     anxiety, depression   [2]  Past Surgical History:  Procedure Laterality Date   • BLADDER SUSPENSION  1980    Mesh   • CHOLECYSTECTOMY     • COLONOSCOPY     • FRACTURE SURGERY Left    • GALLBLADDER SURGERY     • AZ LAPAROSCOPY SURG CHOLECYSTECTOMY N/A 7/11/2016    Procedure: LAPAROSCOPIC CHOLECYSTECTOMY ;  Surgeon: Henok Knutson DO;  Location: [facility];  Service: General. Last assessed: 5/24/16   • [place] OPTX FEM SHFT FX W/INSJ IMED IMPLT W/WO SCREW Left 3/1/2020    Procedure: INSERTION NAIL IM FEMUR ANTEGRADE (TROCHANTERIC);  Surgeon: Segun Alvarez MD;  Location: [facility];  Service: Orthopedics   • TUBAL LIGATION  1976   • URETHRA SURGERY     [3]  Family History  Problem Relation Name Age of Onset   • Other Mother          Methicillin Resistant Staphylococcus Aureus Infection   • Mental illness Mother     • Alzheimer's disease Mother     • Lung cancer Father  54   • Ovarian cancer Sister          unknown age   • No Known Problems Sister     • No Known Problems Daughter     • No Known Problems Daughter     • No Known Problems Maternal  Grandmother     • No Known Problems Maternal Grandfather     • Stomach cancer Paternal Grandmother          unkown age but older   • No Known Problems Paternal Grandfather     • Alzheimer's disease Maternal Aunt     • Alzheimer's disease Maternal Aunt     • No Known Problems Maternal Aunt     • No Known Problems Paternal Aunt     • No Known Problems Paternal Aunt     • No Known Problems Paternal Aunt     • No Known Problems Paternal Aunt     • No Known Problems Paternal Aunt     [4]  Social History  Tobacco Use   • Smoking status: Former     Current packs/day: 0.00     Types: Cigarettes     Quit date: 2004     Years since quittin.1   • Smokeless tobacco: Never   Vaping Use   • Vaping status: Never Used   Substance and Sexual Activity   • Alcohol use: Never     Alcohol/week: 0.0 standard drinks of alcohol   • Drug use: No   [5]  Current Outpatient Medications on File Prior to Visit   Medication Sig   • atorvastatin (LIPITOR) 20 mg tablet Take 1 tablet (20 mg total) by mouth daily   • Calcium 600 MG tablet Take 600 mg by mouth in the morning.   • celecoxib (CeleBREX) 200 mg capsule Take 1 capsule (200 mg total) by mouth daily   • Cholecalciferol (VITAMIN D3) 1000 units CAPS Take 1,000 Units by mouth in the morning.   • clonazePAM (KlonoPIN) 0.5 mg tablet Take 1 tablet (0.5 mg total) by mouth daily at bedtime   • cyanocobalamin 1000 MCG tablet Take 1,000 mcg by mouth in the morning.   • fluticasone (FLONASE) 50 mcg/act nasal spray 2 sprays into each nostril daily   • gabapentin (NEURONTIN) 300 mg capsule Take 1 capsule (300 mg total) by mouth 2 (two) times a day   • ibandronate (BONIVA) 150 MG tablet Take 1 tablet (150 mg total) by mouth every 30 (thirty) days   • lidocaine (LIDODERM) 5 % Apply 1 patch topically daily Remove & Discard patch within 12 hours or as directed by MD   • loratadine (CLARITIN) 10 mg tablet Take 1 tablet (10 mg total) by mouth daily   • Magnesium Oxide -Mg Supplement 400 MG CAPS Take  1 capsule by mouth in the morning.   • melatonin 3 mg Take 3 mg by mouth daily at bedtime   • Multiple Vitamin (MULTI-VITAMIN DAILY) TABS Take by mouth   • Omega-3 Fatty Acids (FISH OIL PO) Take by mouth   • omeprazole (PriLOSEC) 40 MG capsule Take 1 capsule (40 mg total) by mouth daily

## 2025-05-22 NOTE — ASSESSMENT & PLAN NOTE
I reviewed with pt.  Depomedrol given.  Start pred taper tomorrow  CheckXR of LS spine. Consider PT. Recheck 1-2w if not improving  Orders:  •  predniSONE 20 mg tablet; 3 tab po qd x 2 then 2 tab po qd x 2 then 1 tab po qd x 2  •  XR spine lumbar 2 or 3 views injury; Future  •  methylPREDNISolone acetate (DEPO-MEDROL) injection 80 mg

## 2025-05-27 NOTE — ASSESSMENT & PLAN NOTE
Depression Screening Follow-up Plan: Patient's depression screening was positive with a PHQ-9 score of 16. I reviewed with pt.  Will increase Effexor XR ti518bz bid.  I reviewed side effects.  Recheck 4-6w    Orders:  •  venlafaxine (EFFEXOR-XR) 150 mg 24 hr capsule; Take 1 capsule (150 mg total) by mouth 2 (two) times a day

## 2025-05-27 NOTE — ASSESSMENT & PLAN NOTE
EBONIE-7 = 17.Increase Effexor XR to 150mg bid.  Recheck 1m if not improving.  Earlier if worse

## 2025-05-30 ENCOUNTER — TELEPHONE (OUTPATIENT)
Dept: LAB | Facility: HOSPITAL | Age: 70
End: 2025-05-30

## 2025-05-30 ENCOUNTER — TELEPHONE (OUTPATIENT)
Age: 70
End: 2025-05-30

## 2025-05-30 NOTE — TELEPHONE ENCOUNTER
Patient called back regarding her injection for back pain.  Aditi wanted to let PCP know that she feels better, but has to be very careful how she turns or bends. Other than that she feels better than before.    Please advise  Thank you.

## 2025-06-30 ENCOUNTER — PREP FOR PROCEDURE (OUTPATIENT)
Age: 70
End: 2025-06-30

## 2025-06-30 ENCOUNTER — TELEPHONE (OUTPATIENT)
Age: 70
End: 2025-06-30

## 2025-06-30 DIAGNOSIS — F33.1 MODERATE EPISODE OF RECURRENT MAJOR DEPRESSIVE DISORDER (HCC): ICD-10-CM

## 2025-06-30 DIAGNOSIS — Z86.0100 HISTORY OF COLON POLYPS: Primary | ICD-10-CM

## 2025-06-30 NOTE — TELEPHONE ENCOUNTER
Scheduled date of colonoscopy (as of today): 12/12/2025  Physician performing colonoscopy: Dr. Beck  Location of colonoscopy: [facility]  Bowel prep reviewed with patient: ERIN/BRIGHT via email [email or web address]   Instructions reviewed with patient by: Natalio  Clearances: n/a

## 2025-06-30 NOTE — TELEPHONE ENCOUNTER
06/30/25  Screened by: Autumn Howell    Referring Provider Dr. Kan    Pre- Screening:     There is no height or weight on file to calculate BMI.  Has patient been referred for a routine screening Colonoscopy? yes  Is the patient between 45-75 years old? yes      Previous Colonoscopy yes   If yes:    Date: 11/25/2020    Facility: [facility]     Reason: history of polyps        Does the patient want to see a Gastroenterologist prior to their procedure OR are they having any GI symptoms? no    Has the patient been hospitalized or had abdominal surgery in the past 6 months? no    Does the patient use supplemental oxygen? no    Does the patient take Coumadin, Lovenox, Plavix, Elliquis, Xarelto, or other blood thinning medication? no    Has the patient had a stroke, cardiac event, or stent placed in the past year? no      If patient is between 45yrs - 49yrs, please advise patient that we will have to confirm benefits & coverage with their insurance company for a routine screening colonoscopy.

## 2025-07-02 RX ORDER — VENLAFAXINE HYDROCHLORIDE 150 MG/1
150 CAPSULE, EXTENDED RELEASE ORAL 2 TIMES DAILY
Qty: 180 CAPSULE | Refills: 1 | Status: SHIPPED | OUTPATIENT
Start: 2025-07-02

## 2025-07-28 ENCOUNTER — TELEPHONE (OUTPATIENT)
Dept: LAB | Facility: HOSPITAL | Age: 70
End: 2025-07-28

## 2025-08-06 ENCOUNTER — APPOINTMENT (OUTPATIENT)
Dept: LAB | Facility: HOSPITAL | Age: 70
End: 2025-08-06
Attending: FAMILY MEDICINE
Payer: COMMERCIAL

## (undated) DEVICE — MEDI-VAC YANK SUCT HNDL W/TPRD BULBOUS TIP: Brand: CARDINAL HEALTH

## (undated) DEVICE — PAD GROUNDING ADULT

## (undated) DEVICE — PLUMEPEN PRO 10FT

## (undated) DEVICE — 2.5MM REAMING ROD WITH BALL TIP/950MM-STERILE

## (undated) DEVICE — PENCIL ELECTROSURG E-Z CLEAN -0035H

## (undated) DEVICE — DRAPE EQUIPMENT RF WAND

## (undated) DEVICE — DRESSING MEPILEX AG BORDER 4 X 4 IN

## (undated) DEVICE — SUT VICRYL PLUS 2-0 CTB-1 27 IN VCPB259H

## (undated) DEVICE — INTENDED FOR TISSUE SEPARATION, AND OTHER PROCEDURES THAT REQUIRE A SHARP SURGICAL BLADE TO PUNCTURE OR CUT.: Brand: BARD-PARKER SAFETY BLADES SIZE 10, STERILE

## (undated) DEVICE — SUT ETHILON 2-0 FSLX 30 IN 1674H

## (undated) DEVICE — 3.2MM GUIDE WIRE 400MM

## (undated) DEVICE — GLOVE SRG BIOGEL 8

## (undated) DEVICE — STERILE ORIF HIP PACK: Brand: CARDINAL HEALTH

## (undated) DEVICE — ACE WRAP 6 IN UNSTERILE

## (undated) DEVICE — 3M™ STERI-DRAPE™  ISOLATION DRAPE WITH INCISE FILM AND POUCH 1017: Brand: STERI-DRAPE™

## (undated) DEVICE — SPONGE PVP SCRUB WING STERILE

## (undated) DEVICE — GLOVE INDICATOR PI UNDERGLOVE SZ 8.5 BLUE

## (undated) DEVICE — SUT VICRYL PLUS 1 CTB-1 36 IN VCPB947H

## (undated) DEVICE — DRAPE SURGIKIT SADDLE BAG

## (undated) DEVICE — CHLORAPREP HI-LITE 26ML ORANGE

## (undated) DEVICE — ARTHROSCOPY FLOOR MAT